# Patient Record
Sex: FEMALE | Race: OTHER | HISPANIC OR LATINO | Employment: OTHER | URBAN - METROPOLITAN AREA
[De-identification: names, ages, dates, MRNs, and addresses within clinical notes are randomized per-mention and may not be internally consistent; named-entity substitution may affect disease eponyms.]

---

## 2019-11-23 ENCOUNTER — APPOINTMENT (EMERGENCY)
Dept: RADIOLOGY | Facility: HOSPITAL | Age: 69
DRG: 963 | End: 2019-11-23
Payer: COMMERCIAL

## 2019-11-23 ENCOUNTER — HOSPITAL ENCOUNTER (INPATIENT)
Facility: HOSPITAL | Age: 69
LOS: 9 days | Discharge: HOME HEALTH | DRG: 963 | End: 2019-12-02
Attending: SURGERY | Admitting: SURGERY
Payer: COMMERCIAL

## 2019-11-23 ENCOUNTER — APPOINTMENT (INPATIENT)
Dept: RADIOLOGY | Facility: HOSPITAL | Age: 69
DRG: 963 | End: 2019-11-23
Payer: COMMERCIAL

## 2019-11-23 DIAGNOSIS — S43.015A ANTERIOR SHOULDER DISLOCATION, LEFT, INITIAL ENCOUNTER: Primary | ICD-10-CM

## 2019-11-23 DIAGNOSIS — S43.014A ANTERIOR SHOULDER DISLOCATION, RIGHT, INITIAL ENCOUNTER: ICD-10-CM

## 2019-11-23 DIAGNOSIS — S22.42XA MULTIPLE FRACTURES OF RIBS, LEFT SIDE, INITIAL ENCOUNTER FOR CLOSED FRACTURE: ICD-10-CM

## 2019-11-23 LAB
ABO GROUP BLD: NORMAL
APTT PPP: 22 SECONDS (ref 23–37)
BASE EXCESS BLDA CALC-SCNC: -1 MMOL/L (ref -2–3)
BASOPHILS # BLD AUTO: 0.05 THOUSANDS/ΜL (ref 0–0.1)
BASOPHILS NFR BLD AUTO: 1 % (ref 0–1)
BLD GP AB SCN SERPL QL: NEGATIVE
CA-I BLD-SCNC: 1.13 MMOL/L (ref 1.12–1.32)
EOSINOPHIL # BLD AUTO: 0.01 THOUSAND/ΜL (ref 0–0.61)
EOSINOPHIL NFR BLD AUTO: 0 % (ref 0–6)
ERYTHROCYTE [DISTWIDTH] IN BLOOD BY AUTOMATED COUNT: 14.4 % (ref 11.6–15.1)
GLUCOSE SERPL-MCNC: 130 MG/DL (ref 65–140)
HCO3 BLDA-SCNC: 24 MMOL/L (ref 24–30)
HCT VFR BLD AUTO: 39.4 % (ref 34.8–46.1)
HCT VFR BLD CALC: 38 % (ref 34.8–46.1)
HGB BLD-MCNC: 12.4 G/DL (ref 11.5–15.4)
HGB BLDA-MCNC: 12.9 G/DL (ref 11.5–15.4)
IMM GRANULOCYTES # BLD AUTO: 0.16 THOUSAND/UL (ref 0–0.2)
IMM GRANULOCYTES NFR BLD AUTO: 2 % (ref 0–2)
INR PPP: 0.98 (ref 0.84–1.19)
LYMPHOCYTES # BLD AUTO: 2.14 THOUSANDS/ΜL (ref 0.6–4.47)
LYMPHOCYTES NFR BLD AUTO: 22 % (ref 14–44)
MCH RBC QN AUTO: 29.5 PG (ref 26.8–34.3)
MCHC RBC AUTO-ENTMCNC: 31.5 G/DL (ref 31.4–37.4)
MCV RBC AUTO: 94 FL (ref 82–98)
MONOCYTES # BLD AUTO: 0.6 THOUSAND/ΜL (ref 0.17–1.22)
MONOCYTES NFR BLD AUTO: 6 % (ref 4–12)
NEUTROPHILS # BLD AUTO: 6.85 THOUSANDS/ΜL (ref 1.85–7.62)
NEUTS SEG NFR BLD AUTO: 69 % (ref 43–75)
NRBC BLD AUTO-RTO: 0 /100 WBCS
PCO2 BLD: 25 MMOL/L (ref 21–32)
PCO2 BLD: 40.7 MM HG (ref 42–50)
PH BLD: 7.38 [PH] (ref 7.3–7.4)
PLATELET # BLD AUTO: 265 THOUSANDS/UL (ref 149–390)
PMV BLD AUTO: 10.2 FL (ref 8.9–12.7)
PO2 BLD: 20 MM HG (ref 35–45)
POTASSIUM BLD-SCNC: 4 MMOL/L (ref 3.5–5.3)
PROTHROMBIN TIME: 12.6 SECONDS (ref 11.6–14.5)
RBC # BLD AUTO: 4.2 MILLION/UL (ref 3.81–5.12)
RH BLD: POSITIVE
SAO2 % BLD FROM PO2: 30 % (ref 60–85)
SODIUM BLD-SCNC: 142 MMOL/L (ref 136–145)
SPECIMEN EXPIRATION DATE: NORMAL
SPECIMEN SOURCE: ABNORMAL
WBC # BLD AUTO: 9.81 THOUSAND/UL (ref 4.31–10.16)

## 2019-11-23 PROCEDURE — NC001 PR NO CHARGE: Performed by: EMERGENCY MEDICINE

## 2019-11-23 PROCEDURE — 86901 BLOOD TYPING SEROLOGIC RH(D): CPT | Performed by: SURGERY

## 2019-11-23 PROCEDURE — 84132 ASSAY OF SERUM POTASSIUM: CPT

## 2019-11-23 PROCEDURE — 86900 BLOOD TYPING SEROLOGIC ABO: CPT | Performed by: SURGERY

## 2019-11-23 PROCEDURE — 85610 PROTHROMBIN TIME: CPT | Performed by: SURGERY

## 2019-11-23 PROCEDURE — 85025 COMPLETE CBC W/AUTO DIFF WBC: CPT | Performed by: SURGERY

## 2019-11-23 PROCEDURE — 99285 EMERGENCY DEPT VISIT HI MDM: CPT

## 2019-11-23 PROCEDURE — 73200 CT UPPER EXTREMITY W/O DYE: CPT

## 2019-11-23 PROCEDURE — 96375 TX/PRO/DX INJ NEW DRUG ADDON: CPT

## 2019-11-23 PROCEDURE — 73020 X-RAY EXAM OF SHOULDER: CPT

## 2019-11-23 PROCEDURE — 82330 ASSAY OF CALCIUM: CPT

## 2019-11-23 PROCEDURE — 36415 COLL VENOUS BLD VENIPUNCTURE: CPT | Performed by: STUDENT IN AN ORGANIZED HEALTH CARE EDUCATION/TRAINING PROGRAM

## 2019-11-23 PROCEDURE — 85730 THROMBOPLASTIN TIME PARTIAL: CPT | Performed by: SURGERY

## 2019-11-23 PROCEDURE — 72125 CT NECK SPINE W/O DYE: CPT

## 2019-11-23 PROCEDURE — 70450 CT HEAD/BRAIN W/O DYE: CPT

## 2019-11-23 PROCEDURE — 96374 THER/PROPH/DIAG INJ IV PUSH: CPT

## 2019-11-23 PROCEDURE — 96376 TX/PRO/DX INJ SAME DRUG ADON: CPT

## 2019-11-23 PROCEDURE — 84295 ASSAY OF SERUM SODIUM: CPT

## 2019-11-23 PROCEDURE — 99254 IP/OBS CNSLTJ NEW/EST MOD 60: CPT | Performed by: ORTHOPAEDIC SURGERY

## 2019-11-23 PROCEDURE — 82947 ASSAY GLUCOSE BLOOD QUANT: CPT

## 2019-11-23 PROCEDURE — 99222 1ST HOSP IP/OBS MODERATE 55: CPT | Performed by: SURGERY

## 2019-11-23 PROCEDURE — 94760 N-INVAS EAR/PLS OXIMETRY 1: CPT

## 2019-11-23 PROCEDURE — 74177 CT ABD & PELVIS W/CONTRAST: CPT

## 2019-11-23 PROCEDURE — 71260 CT THORAX DX C+: CPT

## 2019-11-23 PROCEDURE — 82803 BLOOD GASES ANY COMBINATION: CPT

## 2019-11-23 PROCEDURE — 85014 HEMATOCRIT: CPT

## 2019-11-23 PROCEDURE — NC001 PR NO CHARGE: Performed by: SURGERY

## 2019-11-23 PROCEDURE — 86850 RBC ANTIBODY SCREEN: CPT | Performed by: SURGERY

## 2019-11-23 RX ORDER — CHLORHEXIDINE GLUCONATE 0.12 MG/ML
15 RINSE ORAL EVERY 12 HOURS SCHEDULED
Status: DISCONTINUED | OUTPATIENT
Start: 2019-11-23 | End: 2019-11-25

## 2019-11-23 RX ORDER — GABAPENTIN 300 MG/1
300 CAPSULE ORAL
Status: DISCONTINUED | OUTPATIENT
Start: 2019-11-23 | End: 2019-12-02 | Stop reason: HOSPADM

## 2019-11-23 RX ORDER — HYDROMORPHONE HCL/PF 1 MG/ML
0.5 SYRINGE (ML) INJECTION
Status: DISCONTINUED | OUTPATIENT
Start: 2019-11-23 | End: 2019-11-23

## 2019-11-23 RX ORDER — FENTANYL CITRATE 50 UG/ML
INJECTION, SOLUTION INTRAMUSCULAR; INTRAVENOUS CODE/TRAUMA/SEDATION MEDICATION
Status: COMPLETED | OUTPATIENT
Start: 2019-11-23 | End: 2019-11-23

## 2019-11-23 RX ORDER — METHOCARBAMOL 500 MG/1
500 TABLET, FILM COATED ORAL EVERY 6 HOURS SCHEDULED
Status: DISCONTINUED | OUTPATIENT
Start: 2019-11-23 | End: 2019-12-01

## 2019-11-23 RX ORDER — LIDOCAINE 50 MG/G
1 PATCH TOPICAL DAILY
Status: DISCONTINUED | OUTPATIENT
Start: 2019-11-23 | End: 2019-12-02 | Stop reason: HOSPADM

## 2019-11-23 RX ORDER — LIDOCAINE HYDROCHLORIDE 10 MG/ML
20 INJECTION, SOLUTION EPIDURAL; INFILTRATION; INTRACAUDAL; PERINEURAL ONCE
Status: COMPLETED | OUTPATIENT
Start: 2019-11-23 | End: 2019-11-23

## 2019-11-23 RX ORDER — OXYCODONE HYDROCHLORIDE 10 MG/1
10 TABLET ORAL EVERY 4 HOURS PRN
Status: DISCONTINUED | OUTPATIENT
Start: 2019-11-23 | End: 2019-11-25

## 2019-11-23 RX ORDER — ETOMIDATE 2 MG/ML
10 INJECTION INTRAVENOUS ONCE
Status: COMPLETED | OUTPATIENT
Start: 2019-11-23 | End: 2019-11-23

## 2019-11-23 RX ORDER — OXYCODONE HYDROCHLORIDE 5 MG/1
5 TABLET ORAL EVERY 4 HOURS PRN
Status: DISCONTINUED | OUTPATIENT
Start: 2019-11-23 | End: 2019-11-25

## 2019-11-23 RX ORDER — ACETAMINOPHEN 325 MG/1
975 TABLET ORAL EVERY 8 HOURS SCHEDULED
Status: DISCONTINUED | OUTPATIENT
Start: 2019-11-23 | End: 2019-12-02 | Stop reason: HOSPADM

## 2019-11-23 RX ORDER — HYDROMORPHONE HCL/PF 1 MG/ML
1 SYRINGE (ML) INJECTION
Status: DISCONTINUED | OUTPATIENT
Start: 2019-11-23 | End: 2019-11-25

## 2019-11-23 RX ADMIN — HYDROMORPHONE HYDROCHLORIDE 0.5 MG: 1 INJECTION, SOLUTION INTRAMUSCULAR; INTRAVENOUS; SUBCUTANEOUS at 18:06

## 2019-11-23 RX ADMIN — METHOCARBAMOL TABLETS 500 MG: 500 TABLET, COATED ORAL at 20:12

## 2019-11-23 RX ADMIN — FENTANYL CITRATE 50 MCG: 50 INJECTION, SOLUTION INTRAMUSCULAR; INTRAVENOUS at 17:01

## 2019-11-23 RX ADMIN — ETOMIDATE 10 MG: 2 INJECTION, SOLUTION INTRAVENOUS at 18:25

## 2019-11-23 RX ADMIN — HYDROMORPHONE HYDROCHLORIDE 1 MG: 1 INJECTION, SOLUTION INTRAMUSCULAR; INTRAVENOUS; SUBCUTANEOUS at 19:55

## 2019-11-23 RX ADMIN — ACETAMINOPHEN 975 MG: 325 TABLET ORAL at 21:51

## 2019-11-23 RX ADMIN — GABAPENTIN 300 MG: 300 CAPSULE ORAL at 21:51

## 2019-11-23 RX ADMIN — FENTANYL CITRATE 50 MCG: 50 INJECTION, SOLUTION INTRAMUSCULAR; INTRAVENOUS at 16:30

## 2019-11-23 RX ADMIN — OXYCODONE HYDROCHLORIDE 5 MG: 5 TABLET ORAL at 21:26

## 2019-11-23 RX ADMIN — OXYCODONE HYDROCHLORIDE 5 MG: 5 TABLET ORAL at 20:45

## 2019-11-23 RX ADMIN — LIDOCAINE 1 PATCH: 50 PATCH CUTANEOUS at 20:12

## 2019-11-23 RX ADMIN — LIDOCAINE HYDROCHLORIDE 20 ML: 10 INJECTION, SOLUTION EPIDURAL; INFILTRATION; INTRACAUDAL; PERINEURAL at 17:17

## 2019-11-23 RX ADMIN — IOHEXOL 100 ML: 350 INJECTION, SOLUTION INTRAVENOUS at 16:45

## 2019-11-23 RX ADMIN — CHLORHEXIDINE GLUCONATE 0.12% ORAL RINSE 15 ML: 1.2 LIQUID ORAL at 21:50

## 2019-11-23 NOTE — H&P
H&P Exam - [address] [facility] 81 y o  female MRN: [id number]  Unit/Bed#: ICU 14 Encounter: [id number]    Assessment/Plan   Trauma Alert: Level B  Model of Arrival: Ambulance  Trauma Team: Attending Dr TREVIZO [facility]  Consultants: None    Trauma Active Problems:   Multiple left-sided rib fractures  Anterior dislocation of Right shoulder    Trauma Plan:   Admit to trauma, ICU  Rib fx protocol  Ortho consult    Chief Complaint: Right shoulder and left flank pain    History of Present Illness   HPI:  Michi Danielson is a 81 y o  female who presents as a level B trauma status post fall down approximately 8 steps  Patient presented with pain complaints to right shoulder and left flank  Patient also endorsed hitting head, + LOC  Patient denies any history of blood thinners  Imaging revealed anterior dislocation of the right shoulder and multiple left-sided rib fractures  Review of Systems  12-point, complete review of systems was reviewed and negative except as stated above  Historical Information   History reviewed  No pertinent past medical history  History reviewed  No pertinent surgical history  Social History   Social History     Substance and Sexual Activity   Alcohol Use Not Currently     Social History     Substance and Sexual Activity   Drug Use Not Currently     Social History     Tobacco Use   Smoking Status Never Smoker       There is no immunization history on file for this patient    Last Tetanus: Today  Family History: Non-contributory    Meds/Allergies   current meds:   Current Facility-Administered Medications   Medication Dose Route Frequency    acetaminophen (TYLENOL) tablet 975 mg  975 mg Oral Q8H [address]    chlorhexidine (PERIDEX) 0 12 % oral rinse 15 mL  15 mL Swish & Spit Q12H [address]    gabapentin (NEURONTIN) capsule 300 mg  300 mg Oral HS    HYDROmorphone (DILAUDID) injection 1 mg  1 mg Intravenous Q3H PRN    lidocaine (LIDODERM) 5 % patch 1 patch  1 patch Topical Daily    methocarbamol (ROBAXIN) tablet 500 mg  500 mg Oral Q6H [facility]    oxyCODONE (ROXICODONE) immediate release tablet 10 mg  10 mg Oral Q4H PRN    oxyCODONE (ROXICODONE) IR tablet 5 mg  5 mg Oral Q4H PRN    and PTA meds:   None       No Known Allergies      PHYSICAL EXAM  Objective   Vitals:   First set: Temperature: 97 5 °F (36 4 °C) (11/23/19 1630)  Pulse: 79 (11/23/19 1630)  Respirations: (!) 24 (11/23/19 1630)  Blood Pressure: 122/67 (11/23/19 1630)    Primary Survey:   (A) Airway:  Intact  (B) Breathing:  CTAB  (C) Circulation: Pulses:   normal  (D) Disabliity:  GCS Total:  15  (E) Expose:  Completed    Secondary Survey: (Click on Physical Exam tab above)  Physical Exam   Constitutional: She is oriented to person, place, and time  She appears well-developed and well-nourished  No distress  HENT:   Head: Normocephalic  Right Ear: External ear normal    Left Ear: External ear normal    Nose: Nose normal    Mouth/Throat: Oropharynx is clear and moist    Hematoma with laceration over left posterior scalp   Eyes: Pupils are equal, round, and reactive to light  Conjunctivae and EOM are normal  No scleral icterus  Neck: Normal range of motion  Neck supple  No JVD present  No tracheal deviation present  C-collar in place   Cardiovascular: Normal rate, regular rhythm and intact distal pulses  Pulmonary/Chest: Effort normal  No respiratory distress  She exhibits no tenderness  Abdominal: Soft  She exhibits no distension  There is tenderness  There is no rebound and no guarding  Left flank tenderness   Genitourinary: Vagina normal    Musculoskeletal: Normal range of motion  She exhibits tenderness  She exhibits no edema  Right shoulder tenderness   Neurological: She is alert and oriented to person, place, and time  No cranial nerve deficit or sensory deficit  Skin: Skin is warm and dry  She is not diaphoretic  Psychiatric: She has a normal mood and affect   Her behavior is normal  Judgment and thought content normal  Nursing note and vitals reviewed  Invasive Devices     Peripheral Intravenous Line            Peripheral IV 11/23/19 Left Antecubital less than 1 day    Peripheral IV 11/23/19 Left Wrist less than 1 day                Lab Results:   BMP/CMP:   Lab Results   Component Value Date    CO2 25 11/23/2019    GLUCOSE 130 11/23/2019   , CBC:   Lab Results   Component Value Date    WBC 9 81 11/23/2019    HGB 12 4 11/23/2019    HCT 39 4 11/23/2019    MCV 94 11/23/2019     11/23/2019    MCH 29 5 11/23/2019    MCHC 31 5 11/23/2019    RDW 14 4 11/23/2019    MPV 10 2 11/23/2019    NRBC 0 11/23/2019   , Coagulation:   Lab Results   Component Value Date    INR 0 98 11/23/2019   , Lactate: No results found for: LACTATE, Amylase: No results found for: AMYLASE, Lipase: No results found for: LIPASE, AST: No results found for: AST, ALT: No results found for: ALT, Urinalysis: No results found for: COLORU, CLARITYU, SPECGRAV, PHUR, LEUKOCYTESUR, NITRITE, PROTEINUA, GLUCOSEU, KETONESU, BILIRUBINUR, BLOODU, CK: No results found for: CKTOTAL, Troponin: No results found for: TROPONINI, EtOH: No results found for: ETOH, UDS: No components found for: RAPIDDRUGSCREEN, Pregnancy: No results found for: PREGTESTUR, ABG: No results found for: PHART, OCX0SCW, PO2ART, XNJ0NKH, L0JVCZHE, BEART, SOURCE and ISTAT: No components found for: VBG  Imaging/EKG Studies:   Procedure: Ct Head Without Contrast  Result Date: 11/23/2019  Impression: Large posterior scalp hematoma and laceration without subjacent skull fracture, intracranial hemorrhage, or other acute intracranial findings  Procedure: Ct Spine Cervical Without Contrast  Result Date: 11/23/2019  Impression: No cervical spine fracture or traumatic malalignment  Procedure: Ct Shoulder Right Wo Contrast  Result Date: 11/23/2019  Impression: 1  Interval reduction of anterior dislocation with normal alignment  2   Hill-Sachs and osseous Bankart fractures again seen    Bankart fragment is displaced inferomedially  Procedure: Ct Chest Abdomen Pelvis W Contrast  Result Date: 11/23/2019  Impression: 1  Multiple left-sided rib fractures, as above  Flail segment at the lateral and posterior 8th and 9th ribs  Shaft width displacement of the posterior left 10th rib with minimal subpleural hematoma  No pneumothorax, hemothorax, pulmonary contusion, or visceral injuries in the left upper quadrant of the abdomen  2   Anterior subcoracoid humeral dislocation with associated Hill-Sachs and osseous Bankart fractures  Procedure: Xr Trauma Multiple  Result Date: 11/23/2019  Impression: 1  Known left-sided rib fractures are not well demonstrated radiographically  Mild atelectasis the left lower lobe from splinting  No contusion, pneumothorax, or hemothorax  2   Right glenohumeral dislocation  3   Normal pelvis        Code Status: Level 1 - Full Code  Advance Directive and Living Will:      Power of :    POLST:

## 2019-11-23 NOTE — PROGRESS NOTES
Cervical Collar Clearance: The patient had a CT scan of the cervical spine demonstrating no acute injury  On exam, the patient had no midline point tenderness or paresthesias/numbness/weakness in the extremities  The patient had full range of motion (was then able to flex, extend, and rotate head laterally) without pain  There were no distracting injuries and the patient was not intoxicated  The patient's cervical spine was cleared radiologically and clinically  Cervical collar removed at this time       Loree Mcgill MD  11/23/2019 6:52 PM

## 2019-11-23 NOTE — ED PROVIDER NOTES
Emergency Department Airway Evaluation and Management Form    History  Obtained from: EMS  Patient has no known allergies  Chief Complaint   Patient presents with    Fall     see trauma flow sheet     HPI  [place] down 8 concrete stairs  Negative LOC  C/o head, shoulder, abdomen pain  History reviewed  No pertinent past medical history  History reviewed  No pertinent surgical history  History reviewed  No pertinent family history  Social History     Tobacco Use    Smoking status: Never Smoker   Substance Use Topics    Alcohol use: Not Currently    Drug use: Not Currently     I have reviewed and agree with the history as documented  Review of Systems    Physical Exam  /73 (BP Location: Left arm)   Pulse 77   Temp 97 5 °F (36 4 °C) (Oral)   Resp 16   Wt 80 8 kg (178 lb 2 1 oz)   SpO2 95%     Physical Exam  Airway intact  Trachea midline  Breath sounds bilaterally  Chest nontender  Pulses intact  Vitals reviewed  GCS 15  Moves all 4 extremities  ED Medications  Medications   HYDROmorphone (DILAUDID) injection 1 mg (has no administration in time range)   fentanyl citrate (PF) 100 MCG/2ML (50 mcg Intravenous Given 11/23/19 1630)   iohexol (OMNIPAQUE) 350 MG/ML injection (MULTI-DOSE) 100 mL (100 mL Intravenous Given 11/23/19 1645)   lidocaine (PF) (XYLOCAINE-MPF) 1 % injection 20 mL (20 mL Infiltration Given 11/23/19 1717)   fentanyl citrate (PF) 100 MCG/2ML (50 mcg Intravenous Given 11/23/19 1701)   etomidate (AMIDATE) 2 mg/mL injection 10 mg (10 mg Intravenous Given 11/23/19 1825)       Intubation  Procedures    Notes  No acute airway issues      Final Diagnosis  Final diagnoses:   Anterior shoulder dislocation, left, initial encounter   Anterior shoulder dislocation, right, initial encounter       ED Provider  Electronically Signed by     Reinaldo Todd MD  11/23/19 4296

## 2019-11-23 NOTE — CONSULTS
[address] [facility] 81 y o  female MRN: [id number]  Unit/Bed#: ED 29 Encounter: [id number]     Physician Requesting Consult: No att  providers found  Consults     Reason for Consultation / Chief Complaint: s/p fall down 8 concrete stairs     History of Present Illness:  Bethanie Rios is a 81 y o  female w/ no reported PMH/PSH, who presents as a Level B Trauma, s/p mechanical fall down 8 concrete steps, striking her head and L side  Per pt and her family, she tripped and fell down the flight of steps  Upon arrival to [facility], pt had GCS of 15, A&O x4  CT Chest revealed multiple L-sided rib fx (lateral and posterior 8th and 9th ribs, shaft width displacement of posterior L 10th rib), with minimal subpleural hematoma, as well as an anterior R subcoracoid humeral dislocation with associated Hill-Sachs and osseous Bankart fractures  CTH showed large posterior scalp hematoma and laceration without any adjacent skull fx or hemorrhage  CT C-spine negative  Currently, pt c/o 10/10 pain in her R shoulder and L chest  Orthopedic Surgery at bedside in ED for R shoulder reduction  History obtained from chart review and the patient  Past Medical History:  History reviewed  No pertinent past medical history  Past Surgical History:  History reviewed  No pertinent surgical history  Past Family History:  History reviewed  No pertinent family history       Social History:  Social History     Tobacco Use   Smoking Status Never Smoker     Social History     Substance and Sexual Activity   Alcohol Use Not Currently     Social History     Substance and Sexual Activity   Drug Use Not Currently     Marital Status: /Civil Union    Home Medications:   Prior to Admission medications    Not on File        Inpatient Medications:  Scheduled Meds:  Current Facility-Administered Medications:  HYDROmorphone 0 5 mg Intravenous Q3H PRN Demi Collins MD     Continuous Infusions:   PRN Meds:    HYDROmorphone 0 5 mg Q3H PRN        Allergies:  No Known Allergies     ROS:   Review of Systems   Constitutional: Negative for chills, diaphoresis, fatigue and fever  HENT: Negative for congestion, postnasal drip, rhinorrhea, sinus pressure, sinus pain, sore throat and trouble swallowing  Eyes: Negative for visual disturbance  Respiratory: Negative for cough, chest tightness, shortness of breath, wheezing and stridor  Cardiovascular: Positive for chest pain  Negative for palpitations and leg swelling  +L-sided chest pain   Gastrointestinal: Negative for abdominal distention, abdominal pain, constipation, diarrhea, nausea and vomiting  Endocrine: Negative for polyphagia  Genitourinary: Negative for flank pain and urgency  Musculoskeletal: Negative for arthralgias, back pain, gait problem, joint swelling, myalgias, neck pain and neck stiffness  + R shoulder pain   Skin: Negative for color change, pallor, rash and wound  Allergic/Immunologic: Negative for environmental allergies, food allergies and immunocompromised state  Neurological: Negative for dizziness, facial asymmetry, light-headedness and headaches  Psychiatric/Behavioral: Negative for agitation, behavioral problems and confusion  Vitals:  Vitals:    19 1635 19 1645 19 1700 19 1715   BP: (!) 176/91 (!) 189/85 (!) 181/80 (!) 175/72   Pulse: 74 77 82 76   Resp: 18 18 18 20   Temp:       TempSrc:       SpO2: 97% 98% 97% 96%   Weight:         Temperature:   Temp (24hrs), Av 5 °F (36 4 °C), Min:97 5 °F (36 4 °C), Max:97 5 °F (36 4 °C)    Current Temperature: 97 5 °F (36 4 °C)     Weights:   IBW: -92 5 kg  There is no height or weight on file to calculate BMI       Hemodynamic Monitoring:  N/A     Non-Invasive/Invasive Ventilation Settings:  Respiratory    Lab Data (Last 4 hours)    None         O2/Vent Data (Last 4 hours)    None              No results found for: PHART, AIA1LWS, PO2ART, FHE2KHW, X6ZYTQMZ, BEART, SOURCE  SpO2: SpO2: 96 %     Physical Exam:  Physical Exam   Constitutional: She is oriented to person, place, and time  She appears well-developed and well-nourished  No distress  HENT:   Head: Normocephalic and atraumatic  Eyes: EOM are normal    Neck: Normal range of motion  Neck supple  Cardiovascular: Normal rate and regular rhythm  Pulmonary/Chest: Effort normal  She exhibits tenderness    + L chest tenderness upon palpation  (-) crepitus   Abdominal: Soft  She exhibits no distension  There is no tenderness  Musculoskeletal:   + R shoulder tenderness   Neurological: She is alert and oriented to person, place, and time  Skin: Skin is warm  She is not diaphoretic  Psychiatric: She has a normal mood and affect  Her behavior is normal    Vitals reviewed  Labs:  Results from last 7 days   Lab Units 11/23/19  1641 11/23/19  1632   WBC Thousand/uL 9 81  --    HEMOGLOBIN g/dL 12 4  --    I STAT HEMOGLOBIN g/dl  --  12 9   HEMATOCRIT % 39 4  --    HEMATOCRIT, ISTAT %  --  38   PLATELETS Thousands/uL 265  --    NEUTROS PCT % 69  --    MONOS PCT % 6  --     Results from last 7 days   Lab Units 11/23/19  1632   CO2, I-STAT mmol/L 25   GLUCOSE, ISTAT mg/dl 130              Results from last 7 days   Lab Units 11/23/19  1641   INR  0 98   PTT seconds 22*         No results found for: TROPONINI     Imaging:     CT head without contrast   Final Result by Alma Delia Wang MD (11/23 1718)      Large posterior scalp hematoma and laceration without subjacent skull fracture, intracranial hemorrhage, or other acute intracranial findings  I personally discussed this study with Matilde FREEMAN on 11/23/2019 at 5:09 PM                   Workstation performed: [id number]         CT spine cervical without contrast   Final Result by Alma Delia Wang MD (11/23 1718)      No cervical spine fracture or traumatic malalignment        I personally discussed this study with Andrei Keen CLAUDE PETIT-ME on 11/23/2019 at 5:09 PM           Workstation performed: [id number]         CT chest abdomen pelvis w contrast   Final Result by Alyse Freeman MD (11/23 1881)      1  Multiple left-sided rib fractures, as above  Flail segment at the lateral and posterior 8th and 9th ribs  Shaft width displacement of the posterior left 10th rib with minimal subpleural hematoma  No pneumothorax, hemothorax, pulmonary contusion,    or visceral injuries in the left upper quadrant of the abdomen  2   Anterior subcoracoid humeral dislocation with associated Hill-Sachs and osseous Bankart fractures  I personally discussed this study with Brianna FREEMAN on 11/23/2019 at 5:09 PM                Workstation performed: [id number]         XR trauma multiple   Final Result by Alyse Freeman MD (11/23 4266)      1  Known left-sided rib fractures are not well demonstrated radiographically  Mild atelectasis the left lower lobe from splinting  No contusion, pneumothorax, or hemothorax  2   Right glenohumeral dislocation  3   Normal pelvis        I personally discussed this study with Brianna FREEMAN on 11/23/2019 at 5:09 PM       Workstation performed: [id number]         XR chest 1 view    (Results Pending)   XR pelvis ap only 1 or 2 vw    (Results Pending)   XR shoulder 2+ vw right    (Results Pending)       Micro:  No results found for: MARIELENA RobertsonLTANTONIA    Assessment: 82 y/o F s/p fall down 8 concrete stairs,with R anterior shoulder dislocation with Hill-Sachs and Bankart fx, multiple L-sided rib  fx        Plan:                  Neuro:       --CTH showing large posterior scalp hematoma and laceration without any adjacent skull fx or hemorrhage      --CT C-spine negative      --GCS 15, A&O x4      --Stable, continue to monitor       --C-spine cleared at bedside                 CV:      --Hemodynamically stable      --Maintain MAP >65                 Lung: --Maintain SpO2>92%     --IS, pulmonary toilet     --AM CXR                 GI:      --No acute issues                 FEN:      --Fluids: n/a     --Replete electrolytes PRN     --Nutrition: Regular diet                 :      --Strict I/Os                 ID:      --No active issues                 Heme:       --Trend H/H      --Transfuse PRN for Hgb <7                 Endo:       --No active issues                 Msk/Skin:       --R anterior shoulder dislocation-Orthopedics consulted, reduction to be performed at bedside      --Sling to be applied to RUE      --Evidence of R humerus fx, Ortho will order CT R shoulder      --Multiple L-sided rib fx---Rib fx protocol, APS c/s      --Routine skin care      --PT/OT                 Disposition: Critical Care     VTE Pharmacologic Prophylaxis: Sequential compression device (Venodyne)  and Heparin  VTE Mechanical Prophylaxis: sequential compression device     Invasive lines and devices: Invasive Devices     Peripheral Intravenous Line            Peripheral IV 11/23/19 Left Antecubital less than 1 day    Peripheral IV 11/23/19 Left Wrist less than 1 day                 Code Status: No Order  POA:    POLST:       Given critical illness, patient length of stay will require greater than two midnights  Counseling / Coordination of Care  Total Critical Care time spent 30 minutes excluding procedures, teaching and family updates  Portions of the record may have been created with voice recognition software  Occasional wrong word or "sound a like" substitutions may have occurred due to the inherent limitations of voice recognition software  Read the chart carefully and recognize, using context, where substitutions have occurred          Aisha Hendrix MD

## 2019-11-23 NOTE — CONSULTS
[address] 81 y o  female MRN: [id number]  Unit/Bed#: Cat Scan      Chief Complaint:   right shoulder pain    HPI:  81 y o  right hand dominant female complaining of right shoulder pain  She fell down approximately 8 stair earlier today and was found to have an anterior shoulder dislocation on the right as well as multiple left sided rib fractures  Pain to the shoulder is global, worse with attempted motion or palpation and improves somewhat with rest  Patient denies any numbness or tingling in the extremity  She denies any further orthopaedic complaints  Review Of Systems:   · Skin: Normal  · Neuro: See HPI  · Musculoskeletal: See HPI  · 14 point review of systems negative except as stated above     Past Medical History:   History reviewed  No pertinent past medical history  Past Surgical History:   History reviewed  No pertinent surgical history  Family History:  Family history reviewed and non-contributory  History reviewed  No pertinent family history      Social History:  Social History     Socioeconomic History    Marital status: /Civil Union     Spouse name: None    Number of children: None    Years of education: None    Highest education level: None   Occupational History    None   Social Needs    Financial resource strain: None    Food insecurity:     Worry: None     Inability: None    Transportation needs:     Medical: None     Non-medical: None   Tobacco Use    Smoking status: Never Smoker   Substance and Sexual Activity    Alcohol use: Not Currently    Drug use: Not Currently    Sexual activity: None   Lifestyle    Physical activity:     Days per week: None     Minutes per session: None    Stress: None   Relationships    Social connections:     Talks on phone: None     Gets together: None     Attends Buddhism service: None     Active member of club or organization: None     Attends meetings of clubs or organizations: None     Relationship status: None  Intimate partner violence:     Fear of current or ex partner: None     Emotionally abused: None     Physically abused: None     Forced sexual activity: None   Other Topics Concern    None   Social History Narrative    None       Allergies:   No Known Allergies        Labs:  0   Lab Value Date/Time    HCT 39 4 11/23/2019 1641    HCT 38 11/23/2019 1632    HGB 12 4 11/23/2019 1641    HGB 12 9 11/23/2019 1632    INR 0 98 11/23/2019 1641    WBC 9 81 11/23/2019 1641       Meds:    Current Facility-Administered Medications:     acetaminophen (TYLENOL) tablet 975 mg, 975 mg, Oral, Q8H [address], Niya Rodriguez MD    chlorhexidine (PERIDEX) 0 12 % oral rinse 15 mL, 15 mL, Swish & [place], Q12H [address]Niya MD    gabapentin (NEURONTIN) capsule 300 mg, 300 mg, Oral, HS, Niya Rodriguez MD    HYDROmorphone (DILAUDID) injection 1 mg, 1 mg, Intravenous, Q3H PRN, Niya Rodriguez MD    lidocaine (LIDODERM) 5 % patch 1 patch, 1 patch, Topical, Daily, Niya Rodriguez MD    methocarbamol (ROBAXIN) tablet 500 mg, 500 mg, Oral, Q6H [address]Ni[address] MD    oxyCODONE (ROXICODONE) immediate release tablet 10 mg, 10 mg, Oral, Q4H PRN, Niya Rodriguez MD    oxyCODONE (ROXICODONE) IR tablet 5 mg, 5 mg, Oral, Q4H PRN, Niya Rodriguez MD  No current outpatient medications on file  Blood Culture:   No results found for: BLOODCX    Wound Culture:   No results found for: WOUNDCULT    Ins and Outs:  I/O last 24 hours: In: 5 [I V :5]  Out: -           Physical Exam:   /73 (BP Location: Left arm)   Pulse 78   Temp 97 5 °F (36 4 °C) (Oral)   Resp 16   Wt 80 8 kg (178 lb 2 1 oz)   SpO2 95%   Gen: Alert and oriented to person, place, time  HEENT: EOMI, eyes clear, moist mucus membranes, hearing intact  Respiratory: Bilateral chest rise   No audible wheezing found  Cardiovascular: Regular Rate and intact distal pulses  Abdomen: soft nontender/nondistended  Musculoskeletal: right upper extremity  · Skin pink, dry, and intact  · Painful range of motion  · Active and passive ROM limited by pain  · Mechanical block to internal rotation appreciated  · Sensation intact to axillary, musculocutaneous, radial, ulna, median nerves  Sensation intact over the deltoid  · Motor intact to musculocutaneous, radial, ulnar, and median nerves  Intact deltoid twitch  · Palpable radial pulse    Radiology:   I personally reviewed the films  X-rays of right shoulder shows anterior dislocation of the glenohumeral joint    _*_*_*_*_*_*_*_*_*_*_*_*_*_*_*_*_*_*_*_*_*_*_*_*_*_*_*_*_*_*_*_*_*_*_*_*_*_*_*_*_*    Procedure- [address]th[address]ome[address] 81 y o  female MRN: [id number]  Unit/Bed#: Cat Scan    Procedure note: Shoulder reduction    After informed consent was obtained and a time out was performed, pt underwent conscious sedation performed by the emergency department  Pts shoulder was reduced using a gentle traction/countertraction maneuver  Post reduction there were no blocks to motion or instability of the glenohumeral joint  Pt was placed in a sling  Post reduction x rays are ordered and will be reviewed  Pt tolerated the procedure well and was neurovascularly intact pre and post procedure        _*_*_*_*_*_*_*_*_*_*_*_*_*_*_*_*_*_*_*_*_*_*_*_*_*_*_*_*_*_*_*_*_*_*_*_*_*_*_*_*_*    Assessment:  81 y o  female with right shoulder dislocation s/p closed reduction  Will obtain dedicated shoulder CT to further evaluate for fractures of the glenoid    Anticipate no surgical intervention during this hospitalization    Plan:   · NWB right upper extremity in sling  · ICE and Analgesics for pain  · Will follow up dedicated shoulder CT  · Ortho will follow         Ricco Malik MD

## 2019-11-24 ENCOUNTER — APPOINTMENT (INPATIENT)
Dept: RADIOLOGY | Facility: HOSPITAL | Age: 69
DRG: 963 | End: 2019-11-24
Payer: COMMERCIAL

## 2019-11-24 LAB
ANION GAP SERPL CALCULATED.3IONS-SCNC: 7 MMOL/L (ref 4–13)
BASOPHILS # BLD AUTO: 0.04 THOUSANDS/ΜL (ref 0–0.1)
BASOPHILS NFR BLD AUTO: 0 % (ref 0–1)
BUN SERPL-MCNC: 23 MG/DL (ref 5–25)
CA-I BLD-SCNC: 1.11 MMOL/L (ref 1.12–1.32)
CALCIUM SERPL-MCNC: 8.3 MG/DL (ref 8.3–10.1)
CHLORIDE SERPL-SCNC: 110 MMOL/L (ref 100–108)
CO2 SERPL-SCNC: 24 MMOL/L (ref 21–32)
CREAT SERPL-MCNC: 0.98 MG/DL (ref 0.6–1.3)
EOSINOPHIL # BLD AUTO: 0.09 THOUSAND/ΜL (ref 0–0.61)
EOSINOPHIL NFR BLD AUTO: 1 % (ref 0–6)
ERYTHROCYTE [DISTWIDTH] IN BLOOD BY AUTOMATED COUNT: 14.7 % (ref 11.6–15.1)
GFR SERPL CREATININE-BSD FRML MDRD: 60 ML/MIN/1.73SQ M
GLUCOSE SERPL-MCNC: 129 MG/DL (ref 65–140)
HCT VFR BLD AUTO: 37.2 % (ref 34.8–46.1)
HGB BLD-MCNC: 11.5 G/DL (ref 11.5–15.4)
IMM GRANULOCYTES # BLD AUTO: 0.09 THOUSAND/UL (ref 0–0.2)
IMM GRANULOCYTES NFR BLD AUTO: 1 % (ref 0–2)
LYMPHOCYTES # BLD AUTO: 1.09 THOUSANDS/ΜL (ref 0.6–4.47)
LYMPHOCYTES NFR BLD AUTO: 10 % (ref 14–44)
MAGNESIUM SERPL-MCNC: 2.1 MG/DL (ref 1.6–2.6)
MCH RBC QN AUTO: 29.3 PG (ref 26.8–34.3)
MCHC RBC AUTO-ENTMCNC: 30.9 G/DL (ref 31.4–37.4)
MCV RBC AUTO: 95 FL (ref 82–98)
MONOCYTES # BLD AUTO: 1.22 THOUSAND/ΜL (ref 0.17–1.22)
MONOCYTES NFR BLD AUTO: 11 % (ref 4–12)
NEUTROPHILS # BLD AUTO: 8.51 THOUSANDS/ΜL (ref 1.85–7.62)
NEUTS SEG NFR BLD AUTO: 77 % (ref 43–75)
NRBC BLD AUTO-RTO: 0 /100 WBCS
PHOSPHATE SERPL-MCNC: 4.3 MG/DL (ref 2.3–4.1)
PLATELET # BLD AUTO: 172 THOUSANDS/UL (ref 149–390)
PMV BLD AUTO: 10 FL (ref 8.9–12.7)
POTASSIUM SERPL-SCNC: 4.3 MMOL/L (ref 3.5–5.3)
RBC # BLD AUTO: 3.92 MILLION/UL (ref 3.81–5.12)
SODIUM SERPL-SCNC: 141 MMOL/L (ref 136–145)
WBC # BLD AUTO: 11.04 THOUSAND/UL (ref 4.31–10.16)

## 2019-11-24 PROCEDURE — 82330 ASSAY OF CALCIUM: CPT | Performed by: SURGERY

## 2019-11-24 PROCEDURE — 94668 MNPJ CHEST WALL SBSQ: CPT

## 2019-11-24 PROCEDURE — 94760 N-INVAS EAR/PLS OXIMETRY 1: CPT

## 2019-11-24 PROCEDURE — 90662 IIV NO PRSV INCREASED AG IM: CPT | Performed by: SURGERY

## 2019-11-24 PROCEDURE — 94664 DEMO&/EVAL PT USE INHALER: CPT

## 2019-11-24 PROCEDURE — 80048 BASIC METABOLIC PNL TOTAL CA: CPT | Performed by: SURGERY

## 2019-11-24 PROCEDURE — 83735 ASSAY OF MAGNESIUM: CPT | Performed by: SURGERY

## 2019-11-24 PROCEDURE — 90471 IMMUNIZATION ADMIN: CPT | Performed by: SURGERY

## 2019-11-24 PROCEDURE — 71045 X-RAY EXAM CHEST 1 VIEW: CPT

## 2019-11-24 PROCEDURE — 84100 ASSAY OF PHOSPHORUS: CPT | Performed by: SURGERY

## 2019-11-24 PROCEDURE — 85025 COMPLETE CBC W/AUTO DIFF WBC: CPT | Performed by: SURGERY

## 2019-11-24 PROCEDURE — 99232 SBSQ HOSP IP/OBS MODERATE 35: CPT | Performed by: SURGERY

## 2019-11-24 PROCEDURE — 94660 CPAP INITIATION&MGMT: CPT

## 2019-11-24 RX ORDER — DEXTROSE MONOHYDRATE 25 G/50ML
INJECTION, SOLUTION INTRAVENOUS
Status: DISPENSED
Start: 2019-11-24 | End: 2019-11-24

## 2019-11-24 RX ORDER — ONDANSETRON 2 MG/ML
INJECTION INTRAMUSCULAR; INTRAVENOUS
Status: COMPLETED
Start: 2019-11-24 | End: 2019-11-24

## 2019-11-24 RX ORDER — SENNOSIDES 8.6 MG
1 TABLET ORAL
Status: DISCONTINUED | OUTPATIENT
Start: 2019-11-24 | End: 2019-12-02 | Stop reason: HOSPADM

## 2019-11-24 RX ORDER — ONDANSETRON 2 MG/ML
4 INJECTION INTRAMUSCULAR; INTRAVENOUS EVERY 6 HOURS PRN
Status: DISCONTINUED | OUTPATIENT
Start: 2019-11-24 | End: 2019-12-02 | Stop reason: HOSPADM

## 2019-11-24 RX ADMIN — SENNOSIDES 8.6 MG: 8.6 TABLET, FILM COATED ORAL at 23:59

## 2019-11-24 RX ADMIN — CHLORHEXIDINE GLUCONATE 0.12% ORAL RINSE 15 ML: 1.2 LIQUID ORAL at 20:27

## 2019-11-24 RX ADMIN — METHOCARBAMOL TABLETS 500 MG: 500 TABLET, COATED ORAL at 00:08

## 2019-11-24 RX ADMIN — ENOXAPARIN SODIUM 30 MG: 30 INJECTION SUBCUTANEOUS at 20:27

## 2019-11-24 RX ADMIN — HYDROMORPHONE HYDROCHLORIDE 1 MG: 1 INJECTION, SOLUTION INTRAMUSCULAR; INTRAVENOUS; SUBCUTANEOUS at 03:05

## 2019-11-24 RX ADMIN — HYDROMORPHONE HYDROCHLORIDE 1 MG: 1 INJECTION, SOLUTION INTRAMUSCULAR; INTRAVENOUS; SUBCUTANEOUS at 10:26

## 2019-11-24 RX ADMIN — OXYCODONE HYDROCHLORIDE 10 MG: 10 TABLET ORAL at 13:02

## 2019-11-24 RX ADMIN — METHOCARBAMOL TABLETS 500 MG: 500 TABLET, COATED ORAL at 11:50

## 2019-11-24 RX ADMIN — ONDANSETRON 4 MG: 2 INJECTION INTRAMUSCULAR; INTRAVENOUS at 00:33

## 2019-11-24 RX ADMIN — HYDROMORPHONE HYDROCHLORIDE 1 MG: 1 INJECTION, SOLUTION INTRAMUSCULAR; INTRAVENOUS; SUBCUTANEOUS at 00:39

## 2019-11-24 RX ADMIN — ACETAMINOPHEN 975 MG: 325 TABLET ORAL at 23:59

## 2019-11-24 RX ADMIN — HYDROMORPHONE HYDROCHLORIDE 1 MG: 1 INJECTION, SOLUTION INTRAMUSCULAR; INTRAVENOUS; SUBCUTANEOUS at 14:12

## 2019-11-24 RX ADMIN — OXYCODONE HYDROCHLORIDE 5 MG: 5 TABLET ORAL at 08:56

## 2019-11-24 RX ADMIN — ACETAMINOPHEN 975 MG: 325 TABLET ORAL at 14:17

## 2019-11-24 RX ADMIN — OXYCODONE HYDROCHLORIDE 10 MG: 10 TABLET ORAL at 17:25

## 2019-11-24 RX ADMIN — INFLUENZA A VIRUS A/MICHIGAN/45/2015 X-275 (H1N1) ANTIGEN (FORMALDEHYDE INACTIVATED), INFLUENZA A VIRUS A/SINGAPORE/INFIMH-16-0019/2016 IVR-186 (H3N2) ANTIGEN (FORMALDEHYDE INACTIVATED), AND INFLUENZA B VIRUS B/MARYLAND/15/2016 BX-69A (A B/COLORADO/6/2017-LIKE VIRUS) ANTIGEN (FORMALDEHYDE INACTIVATED) 0.5 ML: 60; 60; 60 INJECTION, SUSPENSION INTRAMUSCULAR at 11:50

## 2019-11-24 RX ADMIN — CHLORHEXIDINE GLUCONATE 0.12% ORAL RINSE 15 ML: 1.2 LIQUID ORAL at 08:31

## 2019-11-24 RX ADMIN — HYDROMORPHONE HYDROCHLORIDE 1 MG: 1 INJECTION, SOLUTION INTRAMUSCULAR; INTRAVENOUS; SUBCUTANEOUS at 05:32

## 2019-11-24 RX ADMIN — GABAPENTIN 300 MG: 300 CAPSULE ORAL at 23:59

## 2019-11-24 RX ADMIN — METHOCARBAMOL TABLETS 500 MG: 500 TABLET, COATED ORAL at 17:25

## 2019-11-24 RX ADMIN — ONDANSETRON 4 MG: 2 INJECTION INTRAMUSCULAR; INTRAVENOUS at 06:04

## 2019-11-24 RX ADMIN — LIDOCAINE 1 PATCH: 50 PATCH CUTANEOUS at 08:31

## 2019-11-24 RX ADMIN — HYDROMORPHONE HYDROCHLORIDE 1 MG: 1 INJECTION, SOLUTION INTRAMUSCULAR; INTRAVENOUS; SUBCUTANEOUS at 18:07

## 2019-11-24 RX ADMIN — ENOXAPARIN SODIUM 30 MG: 30 INJECTION SUBCUTANEOUS at 08:31

## 2019-11-24 NOTE — RESPIRATORY THERAPY NOTE
Pt  continues to desat to 88-90% on 6L NC  Pt c/o no dyspea or distress but still experiencing pain secondary to right shoulder dislocation and multiple rib fractures  Pt started on HFNC at 10L and spo2 now 92%  RN aware  Will continue to monitor and titrate accordingly

## 2019-11-24 NOTE — UTILIZATION REVIEW
Initial Clinical Review    Admission: Date/Time/Statement: Inpatient Admission Orders (From admission, onward)     Ordered        11/23/19 1849  Inpatient Admission  Once                   Orders Placed This Encounter   Procedures    Inpatient Admission     Standing Status:   Standing     Number of Occurrences:   1     Order Specific Question:   Admitting Physician     Answer:   Dee Brandon     Order Specific Question:   Level of Care     Answer:   Critical Care [15]     Order Specific Question:   Estimated length of stay     Answer:   More than 2 Midnights     Order Specific Question:   Certification     Answer:   I certify that inpatient services are medically necessary for this patient for a duration of greater than two midnights  See H&P and MD Progress Notes for additional information about the patient's course of treatment  ED Arrival Information     Expected Arrival Acuity Means of Arrival Escorted By Service Admission Type    - 11/23/2019 16:25 Immediate Ambulance [facility] ([address]) [address] [facility]    Arrival Complaint    -        Chief Complaint   Patient presents with    Fall     see trauma flow sheet     Assessment/Plan: 81 y o  female with no reported PMH/PSH who presents to ED as a level B trauma s/p fall down ~8 concrete steps  Patient presented with c/o pain to right shoulder and left flank  Patient also endorsed hitting head, + LOC  Patient denies any history of blood thinners  Imaging revealed anterior dislocation of the R shoulder and multiple left-sided rib fractures  Admit inpatient to ICU  Ortho consult, cont pox, O2 as needed currently on 3 lpm nc, IS q 1h while awake, neuro checks q4h, OOB 3x/day, I/O q2h, SCD's, up with assist, nursing dysphagia diet prior to po --> reg diet, PT/OT evals, consult acute pain management for c/o pain 10/10 in her R shoulder and L chest   Reduction to R arm done s/p sedation      Ortho consult 11/23 ---Right glenohumeral anterior dislocation  Patient neurologically and vascularly intact distally  This was a closed injury    Plan: NWB RUE, sling for comfort; analgesics, prn, SCD's for DVT ppx    11/24 --- pt with poor effort with IS d/t pain, now on HFNC @ 55L, FiO2 85%    ED Triage Vitals   Temperature Pulse Respirations Blood Pressure SpO2   11/23/19 1630 11/23/19 1630 11/23/19 1630 11/23/19 1630 11/23/19 1630   97 5 °F (36 4 °C) 79 (!) 24 122/67 97 %      Temp Source Heart Rate Source Patient Position - Orthostatic VS BP Location FiO2 (%)   11/23/19 1630 11/23/19 1630 11/23/19 1630 11/23/19 1825 --   Oral Monitor Lying Left arm       Pain Score       11/23/19 1825       No Pain        Wt Readings from Last 1 Encounters:   11/24/19 79 2 kg (174 lb 9 7 oz)     Additional Vital Signs:   Date/Time  Temp  Pulse  Resp  BP  MAP (mmHg)  SpO2  O2 Device  Patient Position - Orthostatic VS   11/24/19 1636            96 %       11/24/19 1200    100  19  103/67  81  94 %       11/24/19 1100    96  16  111/68  78  92 %       11/24/19 1000    100  22  123/66  84  94 %       11/24/19 0600    100  43Abnormal       89 %Abnormal        11/24/19 0300    98  23Abnormal   114/68  77  87 %Abnormal        11/24/19 0200    100  18  116/70  86  91 %       11/24/19 0100    98  20  108/66  82  89 %Abnormal        11/24/19 0000  98 8 °F (37 1 °C)  96  18  129/76  93  92 %  Nasal cannula  Sitting   11/23/19 2300    104  24Abnormal   121/77  92  92 %       11/23/19 2200    102  30Abnormal   134/78  101  94 %       11/23/19 2100    102  38Abnormal   131/75  90  93 %       11/23/19 2000  98 6 °F (37 °C)  90  33Abnormal   110/62  75  95 %       11/23/19 1948  98 6 °F (37 °C)  90  24Abnormal   92/68  84  94 %  Nasal cannula  Lying   11/23/19 18:30:17        160/115Abnormal            11/23/19 1830    78  16  172/92Abnormal       Nasal cannula  Lying   11/23/19 18:25:17        172/92Abnormal           11/23/19 1825    75  16  158/90    96 %  Nasal cannula  Lying   11/23/19 1715    76  20  175/72Abnormal   114  96 %       11/23/19 1700    82  18  181/80Abnormal   115  97 %       11/23/19 1645    77  18  189/85Abnormal     98 %       11/23/19 1635    74  18  176/91Abnormal     97 %    Lying   11/23/19 1630  97 5 °F (36 4 °C)  79  24Abnormal   122/67    97 %    Lying       Pertinent Labs/Diagnostic Test Results:   Results from last 7 days   Lab Units 11/23/19  1641 11/23/19  1632   WBC Thousand/uL 9 81  --    HEMOGLOBIN g/dL 12 4  --    I STAT HEMOGLOBIN g/dl  --  12 9   HEMATOCRIT % 39 4  --    HEMATOCRIT, ISTAT %  --  38   PLATELETS Thousands/uL 265  --    NEUTROS ABS Thousands/µL 6 85  --      Results from last 7 days   Lab Units 11/24/19  0536 11/23/19  1632   SODIUM mmol/L 141  --    POTASSIUM mmol/L 4 3  --    CHLORIDE mmol/L 110*  --    CO2 mmol/L 24  --    CO2, I-STAT mmol/L  --  25   ANION GAP mmol/L 7  --    BUN mg/dL 23  --    CREATININE mg/dL 0 98  --    EGFR ml/min/1 73sq m 60  --    CALCIUM mg/dL 8 3  --    CALCIUM, IONIZED mmol/L 1 11*  --    CALCIUM, IONIZED, ISTAT mmol/L  --  1 13   MAGNESIUM mg/dL 2 1  --    PHOSPHORUS mg/dL 4 3*  --      Results from last 7 days   Lab Units 11/24/19  0536   GLUCOSE RANDOM mg/dL 129     Results from last 7 days   Lab Units 11/23/19  1632   PH, CODY I-STAT  7 379   PCO2, CODY ISTAT mm HG 40 7*   PO2, CODY ISTAT mm HG 20 0*   HCO3, CODY ISTAT mmol/L 24 0   I STAT BASE EXC mmol/L -1   I STAT O2 SAT % 30*     Results from last 7 days   Lab Units 11/23/19  1641   PROTIME seconds 12 6   INR  0 98   PTT seconds 22*     ED Treatment:   Medication Administration from 11/23/2019 1618 to 11/23/2019 1947       Date/Time Order Dose Route Action     11/23/2019 1630 fentanyl citrate (PF) 100 MCG/2ML 50 mcg Intravenous Given     11/23/2019 1717 lidocaine (PF) (XYLOCAINE-MPF) 1 % injection 20 mL 20 mL Infiltration Given     11/23/2019 1701 fentanyl citrate (PF) 100 MCG/2ML 50 mcg Intravenous Given     11/23/2019 1806 HYDROmorphone (DILAUDID) injection 0 5 mg 0 5 mg Intravenous Given     11/23/2019 1825 etomidate (AMIDATE) 2 mg/mL injection 10 mg 10 mg Intravenous Given     History reviewed  No pertinent past medical history  Present on Admission:  **None**      Admitting Diagnosis: Multiple injuries [T07  XXXA]  Anterior shoulder dislocation, left, initial encounter [S43 015A]  Anterior shoulder dislocation, right, initial encounter [S43 014A]  Age/Sex: 81 y o  female  Admission Orders:  Scheduled Medications:  Medications:  acetaminophen 975 mg Oral Q8H [address]   chlorhexidine 15 mL Swish & Spit Q12H [address]   dextrose      enoxaparin 30 mg Subcutaneous Q12H [address]   gabapentin 300 mg Oral HS   lidocaine 1 patch Topical Daily   methocarbamol 500 mg Oral Q6H [address]        PRN Meds:  HYDROmorphone 1 mg Intravenous Q3H PRN x 6 doses   ondansetron 4 mg Intravenous Q6H PRN  x2 doses   oxyCODONE 10 mg Oral Q4H PRN  x1 dose   oxyCODONE 5 mg Oral Q4H PRN  x3 doses       IP CONSULT TO ORTHOPEDIC SURGERY  IP CONSULT TO CASE MANAGEMENT  IP CONSULT TO ACUTE PAIN SERVICE    Network Utilization Review Department  [email or web address]  ATTENTION: Please call with any questions or concerns to [phone] and carefully listen to the prompts so that you are directed to the right person  All voicemails are confidential   Dipti Man all requests for admission clinical reviews, approved or denied determinations and any other requests to dedicated fax number below belonging to the campus where the patient is receiving treatment    FACILITY NAME UR FAX NUMBER   ADMISSION DENIALS (Administrative/Medical Necessity) [phone]   PARENT CHILD HEALTH (Maternity/NICU/Pediatrics) [phone]     Keturah Seek [phone]   [facility] [address] [facility] [phone]   Lidia Socks [address] [phone]

## 2019-11-24 NOTE — NURSING NOTE
Pt only urinated 225ml of urine all day  Bladder scanned for 184ml of urine  Unable to urinate at this time  Pt is eating and drinking  Nelta Santana ANGEL aware  No new orders obtained at this time

## 2019-11-24 NOTE — PLAN OF CARE
Problem: Prexisting or High Potential for Compromised Skin Integrity  Goal: Skin integrity is maintained or improved  Description  INTERVENTIONS:  - Identify patients at risk for skin breakdown  - Assess and monitor skin integrity  - Assess and monitor nutrition and hydration status  - Monitor labs   - Assess for incontinence   - Turn and reposition patient  - Assist with mobility/ambulation  - Relieve pressure over bony prominences  - Avoid friction and shearing  - Provide appropriate hygiene as needed including keeping skin clean and dry  - Evaluate need for skin moisturizer/barrier cream  - Collaborate with interdisciplinary team   - Patient/family teaching  - Consider wound care consult   Outcome: Progressing     Problem: Potential for Falls  Goal: Patient will remain free of falls  Description  INTERVENTIONS:  - Assess patient frequently for physical needs  -  Identify cognitive and physical deficits and behaviors that affect risk of falls  -  [place] fall precautions as indicated by assessment   - Educate patient/family on patient safety including physical limitations  - Instruct patient to call for assistance with activity based on assessment  - Modify environment to reduce risk of injury  - Consider OT/PT consult to assist with strengthening/mobility  Outcome: Progressing     Problem: NEUROSENSORY - ADULT  Goal: Achieves maximal functionality and self care  Description  INTERVENTIONS  - Monitor swallowing and airway patency with patient fatigue and changes in neurological status  - Encourage and assist patient to increase activity and self care     - Encourage visually impaired, hearing impaired and aphasic patients to use assistive/communication devices  Outcome: Progressing     Problem: CARDIOVASCULAR - ADULT  Goal: Maintains optimal cardiac output and hemodynamic stability  Description  INTERVENTIONS:  - Monitor I/O, vital signs and rhythm  - Monitor for S/S and trends of decreased cardiac output  - Administer and titrate ordered vasoactive medications to optimize hemodynamic stability  - Assess quality of pulses, skin color and temperature  - Assess for signs of decreased coronary artery perfusion  - Instruct patient to report change in severity of symptoms  Outcome: Progressing     Problem: RESPIRATORY - ADULT  Goal: Achieves optimal ventilation and oxygenation  Description  INTERVENTIONS:  - Assess for changes in respiratory status  - Assess for changes in mentation and behavior  - Position to facilitate oxygenation and minimize respiratory effort  - Oxygen administered by appropriate delivery if ordered  - Initiate smoking cessation education as indicated  - Encourage broncho-pulmonary hygiene including cough, deep breathe, Incentive Spirometry  - Assess the need for suctioning and aspirate as needed  - Assess and instruct to report SOB or any respiratory difficulty  - Respiratory Therapy support as indicated  Outcome: Progressing     Problem: GENITOURINARY - ADULT  Goal: Maintains or returns to baseline urinary function  Description  INTERVENTIONS:  - Assess urinary function  - Encourage oral fluids to ensure adequate hydration if ordered  - Administer IV fluids as ordered to ensure adequate hydration  - Administer ordered medications as needed  - Offer frequent toileting  - Follow urinary retention protocol if ordered  Outcome: Progressing     Problem: METABOLIC, FLUID AND ELECTROLYTES - ADULT  Goal: Electrolytes maintained within normal limits  Description  INTERVENTIONS:  - Monitor labs and assess patient for signs and symptoms of electrolyte imbalances  - Administer electrolyte replacement as ordered  - Monitor response to electrolyte replacements, including repeat lab results as appropriate  - Instruct patient on fluid and nutrition as appropriate  Outcome: Progressing  Goal: Fluid balance maintained  Description  INTERVENTIONS:  - Monitor labs   - Monitor I/O and WT  - Instruct patient on fluid and nutrition as appropriate  - Assess for signs & symptoms of volume excess or deficit  Outcome: Progressing     Problem: SKIN/TISSUE INTEGRITY - ADULT  Goal: Skin integrity remains intact  Description  INTERVENTIONS  - Identify patients at risk for skin breakdown  - Assess and monitor skin integrity  - Assess and monitor nutrition and hydration status  - Monitor labs (i e  albumin)  - Assess for incontinence   - Turn and reposition patient  - Assist with mobility/ambulation  - Relieve pressure over bony prominences  - Avoid friction and shearing  - Provide appropriate hygiene as needed including keeping skin clean and dry  - Evaluate need for skin moisturizer/barrier cream  - Collaborate with interdisciplinary team (i e  Nutrition, Rehabilitation, etc )   - Patient/family teaching  Outcome: Progressing     Problem: HEMATOLOGIC - ADULT  Goal: Maintains hematologic stability  Description  INTERVENTIONS  - Assess for signs and symptoms of bleeding or hemorrhage  - Monitor labs  - Administer supportive blood products/factors as ordered and appropriate  Outcome: Progressing     Problem: MUSCULOSKELETAL - ADULT  Goal: Maintain or return mobility to safest level of function  Description  INTERVENTIONS:  - Assess patient's ability to carry out ADLs; assess patient's baseline for ADL function and identify physical deficits which impact ability to perform ADLs (bathing, care of mouth/teeth, toileting, grooming, dressing, etc )  - Assess/evaluate cause of self-care deficits   - Assess range of motion  - Assess patient's mobility  - Assess patient's need for assistive devices and provide as appropriate  - Encourage maximum independence but intervene and supervise when necessary  - Involve family in performance of ADLs  - Assess for home care needs following discharge   - Consider OT consult to assist with ADL evaluation and planning for discharge  - Provide patient education as appropriate  Outcome: Progressing     Problem: Nutrition/Hydration-ADULT  Goal: Nutrient/Hydration intake appropriate for improving, restoring or maintaining nutritional needs  Description  Monitor and assess patient's nutrition/hydration status for malnutrition  Collaborate with interdisciplinary team and initiate plan and interventions as ordered  Monitor patient's weight and dietary intake as ordered or per policy  Utilize nutrition screening tool and intervene as necessary  Determine patient's food preferences and provide high-protein, high-caloric foods as appropriate       INTERVENTIONS:  - Monitor oral intake, urinary output, labs, and treatment plans  - Assess nutrition and hydration status and recommend course of action  - Evaluate amount of meals eaten  - Assist patient with eating if necessary   - Allow adequate time for meals  - Recommend/ encourage appropriate diets, oral nutritional supplements, and vitamin/mineral supplements  - Order, calculate, and assess calorie counts as needed  - Recommend, monitor, and adjust tube feedings and TPN/PPN based on assessed needs  - Assess need for intravenous fluids  - Provide specific nutrition/hydration education as appropriate  - Include patient/family/caregiver in decisions related to nutrition  Outcome: Progressing     Problem: PAIN - ADULT  Goal: Verbalizes/displays adequate comfort level or baseline comfort level  Description  Interventions:  - Encourage patient to monitor pain and request assistance  - Assess pain using appropriate pain scale  - Administer analgesics based on type and severity of pain and evaluate response  - Implement non-pharmacological measures as appropriate and evaluate response  - Consider cultural and social influences on pain and pain management  - Notify physician/advanced practitioner if interventions unsuccessful or patient reports new pain  Outcome: Progressing     Problem: INFECTION - ADULT  Goal: Absence or prevention of progression during hospitalization  Description  INTERVENTIONS:  - Assess and monitor for signs and symptoms of infection  - Monitor lab/diagnostic results  - Monitor all insertion sites, i e  indwelling lines, tubes, and drains  - Monitor endotracheal if appropriate and nasal secretions for changes in amount and color  - [place] appropriate cooling/warming therapies per order  - Administer medications as ordered  - Instruct and encourage patient and family to use good hand hygiene technique  - Identify and instruct in appropriate isolation precautions for identified infection/condition  Outcome: Progressing  Goal: Absence of fever/infection during neutropenic period  Description  INTERVENTIONS:  - Monitor WBC    Outcome: Progressing     Problem: SAFETY ADULT  Goal: Maintain or return to baseline ADL function  Description  INTERVENTIONS:  -  Assess patient's ability to carry out ADLs; assess patient's baseline for ADL function and identify physical deficits which impact ability to perform ADLs (bathing, care of mouth/teeth, toileting, grooming, dressing, etc )  - Assess/evaluate cause of self-care deficits   - Assess range of motion  - Assess patient's mobility; develop plan if impaired  - Assess patient's need for assistive devices and provide as appropriate  - Encourage maximum independence but intervene and supervise when necessary  - Involve family in performance of ADLs  - Assess for home care needs following discharge   - Consider OT consult to assist with ADL evaluation and planning for discharge  - Provide patient education as appropriate  Outcome: Progressing  Goal: Maintain or return mobility status to optimal level  Description  INTERVENTIONS:  - Assess patient's baseline mobility status (ambulation, transfers, stairs, etc )    - Identify cognitive and physical deficits and behaviors that affect mobility  - Identify mobility aids required to assist with transfers and/or ambulation (gait belt, sit-to-stand, lift, walker, cane, etc )  - [place] fall precautions as indicated by assessment  - Record patient progress and toleration of activity level on Mobility SBAR; progress patient to next Phase/Stage  - Instruct patient to call for assistance with activity based on assessment  - Consider rehabilitation consult to assist with strengthening/weightbearing, etc   Outcome: Progressing     Problem: DISCHARGE PLANNING  Goal: Discharge to home or other facility with appropriate resources  Description  INTERVENTIONS:  - Identify barriers to discharge w/patient and caregiver  - Arrange for needed discharge resources and transportation as appropriate  - Identify discharge learning needs (meds, wound care, etc )  - Arrange for interpretive services to assist at discharge as needed  - Refer to Case Management Department for coordinating discharge planning if the patient needs post-hospital services based on physician/advanced practitioner order or complex needs related to functional status, cognitive ability, or social support system  Outcome: Progressing     Problem: Knowledge Deficit  Goal: Patient/family/caregiver demonstrates understanding of disease process, treatment plan, medications, and discharge instructions  Description  Complete learning assessment and assess knowledge base    Interventions:  - Provide teaching at level of understanding  - Provide teaching via preferred learning methods  Outcome: Progressing     Problem: COPING  Goal: Pt/Family able to verbalize concerns and demonstrate effective coping strategies  Description  INTERVENTIONS:  - Assist patient/family to identify coping skills, available support systems and cultural and spiritual values  - Provide emotional support, including active listening and acknowledgement of concerns of patient and caregivers  - Reduce environmental stimuli, as able  - Provide patient education  - Assess for spiritual pain/suffering and initiate spiritual care, including notification of Pastoral Care or matt based community as needed  - Assess effectiveness of coping strategies  Outcome: Progressing  Goal: Will report anxiety at manageable levels  Description  INTERVENTIONS:  - Administer medication as ordered  - Teach and encourage coping skills  - Provide emotional support  - Assess patient/family for anxiety and ability to cope  Outcome: Progressing

## 2019-11-24 NOTE — RESPIRATORY THERAPY NOTE
RT Protocol Note  Slime Borges 81 y o  female MRN: [id number]  Unit/Bed#: ICU 14 Encounter: [id number]    Assessment    Principal Problem:    Anterior shoulder dislocation, right, initial encounter  Active Problems:    Multiple fractures of ribs, left side, initial encounter for closed fracture      Home Pulmonary Medications:    Home Devices/Therapy: (None)    History reviewed  No pertinent past medical history  Social History     Socioeconomic History    Marital status: /Civil Union     Spouse name: None    Number of children: None    Years of education: None    Highest education level: None   Occupational History    None   Social Needs    Financial resource strain: None    Food insecurity:     Worry: None     Inability: None    Transportation needs:     Medical: None     Non-medical: None   Tobacco Use    Smoking status: Never Smoker   Substance and Sexual Activity    Alcohol use: Not Currently    Drug use: Not Currently    Sexual activity: None   Lifestyle    Physical activity:     Days per week: None     Minutes per session: None    Stress: None   Relationships    Social connections:     Talks on phone: None     Gets together: None     Attends [employer] service: None     Active member of club or organization: None     Attends meetings of clubs or organizations: None     Relationship status: None    Intimate partner violence:     Fear of current or ex partner: None     Emotionally abused: None     Physically abused: None     Forced sexual activity: None   Other Topics Concern    None   Social History Narrative    None       Subjective         Objective    Physical Exam:   Assessment Type: Assess only  General Appearance: Alert, Awake  Respiratory Pattern: Shallow, Tachypneic  Chest Assessment: Chest expansion symmetrical  Bilateral Breath Sounds: Clear, Diminished  Cough: None  O2 Device: 3L NC    Vitals:  Blood pressure 110/62, pulse 90, temperature 98 6 °F (37 °C), resp   rate (!) 33, height 5' 2" (1 575 m), weight 79 4 kg (175 lb 0 7 oz), SpO2 94 %  Imaging and other studies: I have personally reviewed pertinent reports  O2 Device: 3L NC     Plan       Airway Clearance Plan: Incentive Spirometer     Resp Comments: (P) Pt  admit s/p fall down steps with right shoulder dislocation and multiple left sided rib fractures  She is English speaking and family at bedside is able to translate  She has no known pulmonary history and takes no respiratory meds at home  Her breath sounds are clear/diminished and CT of chest shows bibasilar atelectasis but no other pulmonary disease  Pt is experiencing an extreme amount of pain and taking rapid/shallow breaths  She is sating mid 80s on 3L NC  She is unable to perform IS therapy at this time  RN at bedside aware of patient pain  Will initiate and encourage IS per AW clearance protocol when able  No other respiratory intervention required

## 2019-11-24 NOTE — RESPIRATORY THERAPY NOTE
RT Protocol Note  Bethanie Rios 81 y o  female MRN: [id number]  Unit/Bed#: ICU 14 Encounter: [id number]    Assessment    Principal Problem:    Anterior shoulder dislocation, right, initial encounter  Active Problems:    Multiple fractures of ribs, left side, initial encounter for closed fracture      Home Pulmonary Medications:    Home Devices/Therapy: (None)    History reviewed  No pertinent past medical history    Social History     Socioeconomic History    Marital status: /Civil Union     Spouse name: None    Number of children: None    Years of education: None    Highest education level: None   Occupational History    None   Social Needs    Financial resource strain: None    Food insecurity:     Worry: None     Inability: None    Transportation needs:     Medical: None     Non-medical: None   Tobacco Use    Smoking status: Never Smoker   Substance and Sexual Activity    Alcohol use: Not Currently    Drug use: Not Currently    Sexual activity: None   Lifestyle    Physical activity:     Days per week: None     Minutes per session: None    Stress: None   Relationships    Social connections:     Talks on phone: None     Gets together: None     Attends [employer] witness service: None     Active member of club or organization: None     Attends meetings of clubs or organizations: None     Relationship status: None    Intimate partner violence:     Fear of current or ex partner: None     Emotionally abused: None     Physically abused: None     Forced sexual activity: None   Other Topics Concern    None   Social History Narrative    None       Subjective         Objective    Physical Exam:   Assessment Type: Assess only  General Appearance: Alert, Awake  Respiratory Pattern: Shallow  Chest Assessment: Chest expansion symmetrical  Bilateral Breath Sounds: Clear, Diminished  Cough: None  O2 Device: HFNC    Vitals:  Blood pressure 141/77, pulse 102, temperature 98 8 °F (37 1 °C), temperature source Oral, resp  rate (!) 24, height 5' 2" (1 575 m), weight 79 2 kg (174 lb 9 7 oz), SpO2 92 %  Imaging and other studies: I have personally reviewed pertinent reports  O2 Device: HFNC     Plan    Respiratory Plan: Vent/NIV/HFNC  Airway Clearance Plan: Incentive Spirometer     Resp Comments: (P) Pt desat to 86% on 12L NC  Pt transitioned to HFNC 85% and 55L and reassessed per respiratory protocol  She continues to experience pain with periods of rapid/shallow breathing and recently vomited  Her BS are clear bilaterally and family at bedside confirms she has no pulmonary history and takes no respiratory meds at home  No indication for bronchodilators at this time  Will continue to monitor and per protocol and titrate FiO2 as tolerated

## 2019-11-24 NOTE — DISCHARGE INSTRUCTIONS
Discharge Instructions - [facility] [place] 81 [facility] o  female MRN: [id number]  Unit/Bed#: ICU 14-01    Weight Bearing Status:                                           No weight bearing to right arm, maintain sling, avoid lifting with right arm however may perform elbow and wrist range of motion exercises     Pain:  Continue analgesics as directed    PT/OT:  Continue PT/OT on outpatient basis as directed at your follow up appointment     Appt Instructions: If you do not have your appointment, please call the clinic at [phone] to f/u with Dr Isabell Deal in 3 weeks   Otherwise followup as scheduled     Contact the office sooner if you experience any increased numbness/tingling in the extremities  Miscellaneous:    Rib Fracture Discharge Instructions: Your rib fractures will take time to heal  Do not do any strenuous activity or lift any thing more than 10 pounds until you are cleared to do so by the Trauma clinic  Take your pain medication, if needed, as prescribed and make sure you continue to perform cough and deep breathing exercises and use your incentive spirometer every two hours while awake to maintain healthy lungs post injury  You should be seen in the Trauma Clinic 2-3 weeks after being discharged  Please call the Trauma Clinic sooner if you have any questions or concerns or if you experience increased work of breathing, shortness of breath, difficulty breathing, activity intolerance or chest pain

## 2019-11-24 NOTE — RESPIRATORY THERAPY NOTE
resp care      11/24/19 0905   Respiratory Protocol   Language Barrier? Yes   Respiratory Assessment   Assessment Type Assess only   General Appearance Alert; Awake   Respiratory Pattern Normal   Chest Assessment Chest expansion symmetrical   Resp Comments Pt requires to need hfnc  Pt has poor effort with IS due to pain  Pt instructed on ezpap for reversal of atelectais  Will cont to follow per acp      O2 Device HFNC   Additional Assessments   Pulse 101   Respirations (!) 27   SpO2 93 %

## 2019-11-24 NOTE — PROGRESS NOTES
Progress Note - [address] [age] y o  female MRN: [id number]  Unit/Bed#: ICU 14 Encounter: [id number]    Assessment: 82 y/o F s/p fall down 8 concrete stairs,with R anterior shoulder dislocation with Hill-Sachs and Bankart fx, multiple L-sided rib  fx    Plan:          Neuro:     --GCS 15, A&O x4    --Stable, continue to monitor                 CV:       --Hemodynamically stable      --Maintain MAP >56                 Lung:       --Placed on HFNC this AM      --Maintain SpO2>92%      --Aggressive pulmonary toilet, IS      --f/u AM CXR, PA/Lat ordered 24h after admission                 GI:      --No acute issues                 FEN:      --Fluids: n/a     --Replete electrolytes PRN     --Nutrition: Regular diet                 :      --Strict I/Os                 ID:      --No active issues                 Heme:       --Trend H/H      --Transfuse PRN for Hgb <7                 Endo:      --No active issues                            Msk/Skin:       --R anterior shoulder dislocation with Hill-Sachs and Bankart fx, reduced at bedside by Orthopedics      --Sling to be applied to RUE, Ortho will continue to follow      --Multiple L-sided rib fx---Rib fx protocol, APS c/s for possible epidural placement      --Routine skin care      --PT/OT                 Disposition: Continue Critical Care      HPI/24hr events:     No acute events overnight  This morning, pt continuing to c/o L-sided chest pain  Physical Exam:     GEN: NAD  HEENT: MMM  Chest: L chest tenderness  CV: RRR  Lung: on HFNC  Ab: Soft, NT/ND  Extrem: RUE in sling  Neuro:  A+Ox3, motor and sensation grossly intact      Vitals:    19 0600 19 0603 19 0610 19 0618   BP:   141/77    BP Location:       Pulse: 100  102    Resp: (!) 43  (!) 24    Temp:       TempSrc:       SpO2: (!) 89% 92% (!) 86% 92%   Weight: 79 2 kg (174 lb 9 7 oz)      Height:                 Temperature:   Temp (24hrs), Av 4 °F (36 9 °C), Min:97 5 °F (36 4 °C), Max:98 8 °F (37 1 °C)    Current: Temperature: 98 8 °F (37 1 °C)    Weights:   IBW: 50 1 kg    Body mass index is 31 94 kg/m²  Weight (last 2 days)     Date/Time   Weight    11/24/19 0600   79 2 (174 6)    11/23/19 1948   79 4 (175 05)    11/23/19 1630   80 8 (178 13)              Hemodynamic Monitoring:  N/A     Non-Invasive/Invasive Ventilation Settings:  Respiratory    Lab Data (Last 4 hours)    None         O2/Vent Data (Last 4 hours)      11/24 0349 11/24 0603 11/24 0618      Non-Invasive Ventilation Mode HFNC HFNC HFNC               No results found for: PHART, IGI7UKD, PO2ART, RUQ2FBA, S2XLUYGY, BEART, SOURCE  SpO2: SpO2: 92 %    Intake and Outputs:  I/O       11/22 0701 - 11/23 0700 11/23 0701 - 11/24 0700 11/24 0701 - 11/25 0700    I V  (mL/kg)  5 (0 1)     Total Intake(mL/kg)  5 (0 1)     Urine (mL/kg/hr)  300     Emesis/NG output  575     Total Output  875     Net  -870                Nutrition:        Diet Orders   (From admission, onward)             Start     Ordered    11/23/19 1852  Diet Regular; Regular House  Diet effective now     Question Answer Comment   Diet Type Regular    Regular Regular House    RD to adjust diet per protocol?  Yes        11/23/19 1851                Labs:   Results from last 7 days   Lab Units 11/23/19  1641 11/23/19  1632   WBC Thousand/uL 9 81  --    HEMOGLOBIN g/dL 12 4  --    I STAT HEMOGLOBIN g/dl  --  12 9   HEMATOCRIT % 39 4  --    HEMATOCRIT, ISTAT %  --  38   PLATELETS Thousands/uL 265  --    NEUTROS PCT % 69  --    MONOS PCT % 6  --     Results from last 7 days   Lab Units 11/24/19  0536 11/23/19  1632   SODIUM mmol/L 141  --    POTASSIUM mmol/L 4 3  --    CHLORIDE mmol/L 110*  --    CO2 mmol/L 24  --    CO2, I-STAT mmol/L  --  25   BUN mg/dL 23  --    CREATININE mg/dL 0 98  --    CALCIUM mg/dL 8 3  --    GLUCOSE, ISTAT mg/dl  --  130     Results from last 7 days   Lab Units 11/24/19  0536   MAGNESIUM mg/dL 2 1     Results from last 7 days Lab Units 11/24/19  0536   PHOSPHORUS mg/dL 4 3*      Results from last 7 days   Lab Units 11/23/19  1641   INR  0 98   PTT seconds 22*         No results found for: TROPONINI    Imaging:     CT shoulder right wo contrast   Final Result by Alyse Freeman MD (11/23 1957)      1  Interval reduction of anterior dislocation with normal alignment  2   Hill-Sachs and osseous Bankart fractures again seen  Bankart fragment is displaced inferomedially  Workstation performed: [id number]         CT head without contrast   Final Result by Alyse Freeman MD (11/23 1718)      Large posterior scalp hematoma and laceration without subjacent skull fracture, intracranial hemorrhage, or other acute intracranial findings  I personally discussed this study with Brianna FREEMAN on 11/23/2019 at 5:09 PM                   Workstation performed: [id number]         CT spine cervical without contrast   Final Result by Alyse Freeman MD (11/23 1718)      No cervical spine fracture or traumatic malalignment  I personally discussed this study with Brianna FREEMAN on 11/23/2019 at 5:09 PM           Workstation performed: [id number]         CT chest abdomen pelvis w contrast   Final Result by Alyse Freeman MD (11/23 1717)      1  Multiple left-sided rib fractures, as above  Flail segment at the lateral and posterior 8th and 9th ribs  Shaft width displacement of the posterior left 10th rib with minimal subpleural hematoma  No pneumothorax, hemothorax, pulmonary contusion,    or visceral injuries in the left upper quadrant of the abdomen  2   Anterior subcoracoid humeral dislocation with associated Hill-Sachs and osseous Bankart fractures  I personally discussed this study with Brianna FREEMAN on 11/23/2019 at 5:09 PM                Workstation performed: [id number]         XR trauma multiple   Final Result by Alyse Freeman MD (11/23 1722)      1    Known left-sided rib fractures are not well demonstrated radiographically  Mild atelectasis the left lower lobe from splinting  No contusion, pneumothorax, or hemothorax  2   Right glenohumeral dislocation  3   Normal pelvis  I personally discussed this study with Marli FREEMAN on 11/23/2019 at 5:09 PM       Workstation performed: [id number]         XR chest 1 view    (Results Pending)   XR pelvis ap only 1 or 2 vw    (Results Pending)   XR shoulder 1 vw right    (Results Pending)   XR chest pa & lateral (24 hours after admission)    (Results Pending)   XR chest portable    (Results Pending)         Micro:  No results found for: Gianni , WOUNDCULT, SPUTUMCULTUR    Allergies: No Known Allergies    Medications:   Scheduled Meds:  Current Facility-Administered Medications:  acetaminophen 975 mg Oral [facility] Esequiel Rausch MD   chlorhexidine 15 mL Swish [employer] Spit Q12H [address] Esequiel Rausch MD   dextrose       gabapentin 300 mg Oral HS Esequiel Rausch MD   HYDROmorphone 1 mg Intravenous Q3H PRN Esequiel Rausch MD   lidocaine 1 patch Topical Daily Esequiel Rausch MD   methocarbamol 500 mg Oral Q6H [address] Esequiel Rausch MD   ondansetron 4 mg Intravenous Q6H PRN Esequiel Rausch MD   oxyCODONE 10 mg Oral Q4H PRN Esequiel Rausch MD   oxyCODONE 5 mg Oral Q4H PRN Esequiel Rausch MD     Continuous Infusions:   PRN Meds:    HYDROmorphone 1 mg Q3H PRN   ondansetron 4 mg Q6H PRN   oxyCODONE 10 mg Q4H PRN   oxyCODONE 5 mg Q4H PRN       VTE Pharmacologic Prophylaxis: Enoxaparin (Lovenox)  VTE Mechanical Prophylaxis: sequential compression device    Invasive lines and devices: Invasive Devices     Peripheral Intravenous Line            Peripheral IV 11/23/19 Left Antecubital less than 1 day    Peripheral IV 11/23/19 Left Wrist less than 1 day                   Counseling / Coordination of Care  Total Critical Care time spent 30 minutes excluding procedures, teaching and family updates         Code Status: Level 1 - Full Code     Portions of the record may have been created with voice recognition software  Occasional wrong word or "sound a like" substitutions may have occurred due to the inherent limitations of voice recognition software  Read the chart carefully and recognize, using context, where substitutions have occurred       Lakshmi Henry MD

## 2019-11-25 ENCOUNTER — ANESTHESIA EVENT (INPATIENT)
Dept: ANESTHESIOLOGY | Facility: HOSPITAL | Age: 69
DRG: 963 | End: 2019-11-25
Payer: COMMERCIAL

## 2019-11-25 ENCOUNTER — ANESTHESIA (INPATIENT)
Dept: ANESTHESIOLOGY | Facility: HOSPITAL | Age: 69
DRG: 963 | End: 2019-11-25
Payer: COMMERCIAL

## 2019-11-25 ENCOUNTER — APPOINTMENT (INPATIENT)
Dept: RADIOLOGY | Facility: HOSPITAL | Age: 69
DRG: 963 | End: 2019-11-25
Payer: COMMERCIAL

## 2019-11-25 PROBLEM — G89.11 ACUTE PAIN DUE TO TRAUMA: Status: ACTIVE | Noted: 2019-11-25
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
## 2019-11-25 PROBLEM — J96.01 ACUTE RESPIRATORY FAILURE WITH HYPOXIA (HCC): Status: ACTIVE | Noted: 2019-11-25

## 2019-11-25 PROBLEM — D72.829 LEUKOCYTOSIS: Status: ACTIVE | Noted: 2019-11-25

## 2019-11-25 LAB
ARTERIAL PATENCY WRIST A: YES
BASE EXCESS BLDA CALC-SCNC: -3 MMOL/L
HCO3 BLDA-SCNC: 22.9 MMOL/L (ref 22–28)
HFNC FLOW LPM: 60
NON VENT HFNC FIO2: 100
NON VENT TYPE HFNC: ABNORMAL
O2 CT BLDA-SCNC: 16.5 ML/DL (ref 16–23)
OXYHGB MFR BLDA: 94.9 % (ref 94–97)
PCO2 BLDA: 44.3 MM HG (ref 36–44)
PH BLDA: 7.33 [PH] (ref 7.35–7.45)
PO2 BLDA: 83.3 MM HG (ref 75–129)
SPECIMEN SOURCE: ABNORMAL

## 2019-11-25 PROCEDURE — G8989 SELF CARE D/C STATUS: HCPCS

## 2019-11-25 PROCEDURE — 94760 N-INVAS EAR/PLS OXIMETRY 1: CPT | Performed by: SOCIAL WORKER

## 2019-11-25 PROCEDURE — 3E0R3BZ INTRODUCTION OF ANESTHETIC AGENT INTO SPINAL CANAL, PERCUTANEOUS APPROACH: ICD-10-PCS | Performed by: ANESTHESIOLOGY

## 2019-11-25 PROCEDURE — G8988 SELF CARE GOAL STATUS: HCPCS

## 2019-11-25 PROCEDURE — 97163 PT EVAL HIGH COMPLEX 45 MIN: CPT

## 2019-11-25 PROCEDURE — 94660 CPAP INITIATION&MGMT: CPT | Performed by: SOCIAL WORKER

## 2019-11-25 PROCEDURE — 94668 MNPJ CHEST WALL SBSQ: CPT | Performed by: SOCIAL WORKER

## 2019-11-25 PROCEDURE — 82805 BLOOD GASES W/O2 SATURATION: CPT | Performed by: PHYSICIAN ASSISTANT

## 2019-11-25 PROCEDURE — 99291 CRITICAL CARE FIRST HOUR: CPT | Performed by: EMERGENCY MEDICINE

## 2019-11-25 PROCEDURE — 97167 OT EVAL HIGH COMPLEX 60 MIN: CPT

## 2019-11-25 PROCEDURE — G8978 MOBILITY CURRENT STATUS: HCPCS

## 2019-11-25 PROCEDURE — G8979 MOBILITY GOAL STATUS: HCPCS

## 2019-11-25 PROCEDURE — 94760 N-INVAS EAR/PLS OXIMETRY 1: CPT

## 2019-11-25 PROCEDURE — C9290 INJ, BUPIVACAINE LIPOSOME: HCPCS | Performed by: ANESTHESIOLOGY

## 2019-11-25 PROCEDURE — 94668 MNPJ CHEST WALL SBSQ: CPT

## 2019-11-25 RX ORDER — SODIUM CHLORIDE, SODIUM GLUCONATE, SODIUM ACETATE, POTASSIUM CHLORIDE, MAGNESIUM CHLORIDE, SODIUM PHOSPHATE, DIBASIC, AND POTASSIUM PHOSPHATE .53; .5; .37; .037; .03; .012; .00082 G/100ML; G/100ML; G/100ML; G/100ML; G/100ML; G/100ML; G/100ML
50 INJECTION, SOLUTION INTRAVENOUS CONTINUOUS
Status: DISCONTINUED | OUTPATIENT
Start: 2019-11-25 | End: 2019-11-25

## 2019-11-25 RX ORDER — PREDNISONE 10 MG/1
10 TABLET ORAL DAILY
Status: DISCONTINUED | OUTPATIENT
Start: 2019-11-26 | End: 2019-12-02 | Stop reason: HOSPADM

## 2019-11-25 RX ORDER — HYDROMORPHONE HCL/PF 1 MG/ML
0.5 SYRINGE (ML) INJECTION
Status: DISCONTINUED | OUTPATIENT
Start: 2019-11-25 | End: 2019-12-02

## 2019-11-25 RX ORDER — OXYCODONE HYDROCHLORIDE 5 MG/1
2.5 TABLET ORAL EVERY 4 HOURS PRN
Status: DISCONTINUED | OUTPATIENT
Start: 2019-11-25 | End: 2019-11-29

## 2019-11-25 RX ORDER — FUROSEMIDE 20 MG/1
20 TABLET ORAL ONCE
Status: COMPLETED | OUTPATIENT
Start: 2019-11-25 | End: 2019-11-25

## 2019-11-25 RX ORDER — MIDAZOLAM HYDROCHLORIDE 2 MG/2ML
2 INJECTION, SOLUTION INTRAMUSCULAR; INTRAVENOUS ONCE
Status: COMPLETED | OUTPATIENT
Start: 2019-11-25 | End: 2019-11-25

## 2019-11-25 RX ORDER — SERTRALINE HYDROCHLORIDE 100 MG/1
100 TABLET, FILM COATED ORAL DAILY
Status: DISCONTINUED | OUTPATIENT
Start: 2019-11-26 | End: 2019-12-02 | Stop reason: HOSPADM

## 2019-11-25 RX ORDER — OXYCODONE HYDROCHLORIDE 5 MG/1
5 TABLET ORAL EVERY 4 HOURS PRN
Status: DISCONTINUED | OUTPATIENT
Start: 2019-11-25 | End: 2019-11-29

## 2019-11-25 RX ORDER — BUPIVACAINE HYDROCHLORIDE 2.5 MG/ML
INJECTION, SOLUTION INFILTRATION; PERINEURAL
Status: COMPLETED | OUTPATIENT
Start: 2019-11-25 | End: 2019-11-25

## 2019-11-25 RX ORDER — BISACODYL 10 MG
10 SUPPOSITORY, RECTAL RECTAL DAILY PRN
Status: DISCONTINUED | OUTPATIENT
Start: 2019-11-25 | End: 2019-12-02 | Stop reason: HOSPADM

## 2019-11-25 RX ORDER — SODIUM CHLORIDE, SODIUM GLUCONATE, SODIUM ACETATE, POTASSIUM CHLORIDE, MAGNESIUM CHLORIDE, SODIUM PHOSPHATE, DIBASIC, AND POTASSIUM PHOSPHATE .53; .5; .37; .037; .03; .012; .00082 G/100ML; G/100ML; G/100ML; G/100ML; G/100ML; G/100ML; G/100ML
50 INJECTION, SOLUTION INTRAVENOUS CONTINUOUS
Status: DISCONTINUED | OUTPATIENT
Start: 2019-11-25 | End: 2019-11-26

## 2019-11-25 RX ORDER — SODIUM CHLORIDE, SODIUM GLUCONATE, SODIUM ACETATE, POTASSIUM CHLORIDE, MAGNESIUM CHLORIDE, SODIUM PHOSPHATE, DIBASIC, AND POTASSIUM PHOSPHATE .53; .5; .37; .037; .03; .012; .00082 G/100ML; G/100ML; G/100ML; G/100ML; G/100ML; G/100ML; G/100ML
500 INJECTION, SOLUTION INTRAVENOUS ONCE
Status: COMPLETED | OUTPATIENT
Start: 2019-11-25 | End: 2019-11-25

## 2019-11-25 RX ORDER — FENTANYL CITRATE/PF 50 MCG/ML
100 SYRINGE (ML) INJECTION ONCE
Status: COMPLETED | OUTPATIENT
Start: 2019-11-25 | End: 2019-11-25

## 2019-11-25 RX ORDER — DOCUSATE SODIUM 100 MG/1
100 CAPSULE, LIQUID FILLED ORAL 2 TIMES DAILY
Status: DISCONTINUED | OUTPATIENT
Start: 2019-11-25 | End: 2019-12-02 | Stop reason: HOSPADM

## 2019-11-25 RX ORDER — HEPARIN SODIUM 5000 [USP'U]/ML
5000 INJECTION, SOLUTION INTRAVENOUS; SUBCUTANEOUS EVERY 12 HOURS SCHEDULED
Status: DISCONTINUED | OUTPATIENT
Start: 2019-11-25 | End: 2019-12-02 | Stop reason: HOSPADM

## 2019-11-25 RX ADMIN — BUPIVACAINE 20 ML: 13.3 INJECTION, SUSPENSION, LIPOSOMAL INFILTRATION at 10:32

## 2019-11-25 RX ADMIN — SENNOSIDES 8.6 MG: 8.6 TABLET, FILM COATED ORAL at 22:01

## 2019-11-25 RX ADMIN — METHOCARBAMOL TABLETS 500 MG: 500 TABLET, COATED ORAL at 12:45

## 2019-11-25 RX ADMIN — METHOCARBAMOL TABLETS 500 MG: 500 TABLET, COATED ORAL at 18:32

## 2019-11-25 RX ADMIN — LIDOCAINE 1 PATCH: 50 PATCH CUTANEOUS at 09:38

## 2019-11-25 RX ADMIN — ACETAMINOPHEN 975 MG: 325 TABLET ORAL at 22:01

## 2019-11-25 RX ADMIN — OXYCODONE HYDROCHLORIDE 10 MG: 10 TABLET ORAL at 09:38

## 2019-11-25 RX ADMIN — OXYCODONE HYDROCHLORIDE 10 MG: 10 TABLET ORAL at 00:08

## 2019-11-25 RX ADMIN — OXYCODONE HYDROCHLORIDE 10 MG: 10 TABLET ORAL at 04:36

## 2019-11-25 RX ADMIN — SODIUM CHLORIDE, SODIUM GLUCONATE, SODIUM ACETATE, POTASSIUM CHLORIDE AND MAGNESIUM CHLORIDE 50 ML/HR: 526; 502; 368; 37; 30 INJECTION, SOLUTION INTRAVENOUS at 12:24

## 2019-11-25 RX ADMIN — DOCUSATE SODIUM 100 MG: 100 CAPSULE, LIQUID FILLED ORAL at 09:38

## 2019-11-25 RX ADMIN — SODIUM CHLORIDE, SODIUM GLUCONATE, SODIUM ACETATE, POTASSIUM CHLORIDE AND MAGNESIUM CHLORIDE 50 ML/HR: 526; 502; 368; 37; 30 INJECTION, SOLUTION INTRAVENOUS at 18:33

## 2019-11-25 RX ADMIN — FUROSEMIDE 20 MG: 20 TABLET ORAL at 02:40

## 2019-11-25 RX ADMIN — ONDANSETRON 4 MG: 2 INJECTION INTRAMUSCULAR; INTRAVENOUS at 16:04

## 2019-11-25 RX ADMIN — MIDAZOLAM 2 MG: 1 INJECTION INTRAMUSCULAR; INTRAVENOUS at 10:12

## 2019-11-25 RX ADMIN — SODIUM CHLORIDE, SODIUM GLUCONATE, SODIUM ACETATE, POTASSIUM CHLORIDE AND MAGNESIUM CHLORIDE: 526; 502; 368; 37; 30 INJECTION, SOLUTION INTRAVENOUS at 15:30

## 2019-11-25 RX ADMIN — FENTANYL CITRATE 100 MCG: 50 INJECTION, SOLUTION INTRAMUSCULAR; INTRAVENOUS at 10:11

## 2019-11-25 RX ADMIN — BUPIVACAINE HYDROCHLORIDE 30 ML: 2.5 INJECTION, SOLUTION INFILTRATION; PERINEURAL at 10:32

## 2019-11-25 RX ADMIN — CHLORHEXIDINE GLUCONATE 0.12% ORAL RINSE 15 ML: 1.2 LIQUID ORAL at 09:38

## 2019-11-25 RX ADMIN — ROPIVACAINE HYDROCHLORIDE: 5 INJECTION, SOLUTION EPIDURAL; INFILTRATION; PERINEURAL at 15:40

## 2019-11-25 RX ADMIN — HEPARIN SODIUM 5000 UNITS: 5000 INJECTION INTRAVENOUS; SUBCUTANEOUS at 22:00

## 2019-11-25 RX ADMIN — METHOCARBAMOL TABLETS 500 MG: 500 TABLET, COATED ORAL at 00:07

## 2019-11-25 RX ADMIN — HYDROMORPHONE HYDROCHLORIDE 1 MG: 1 INJECTION, SOLUTION INTRAMUSCULAR; INTRAVENOUS; SUBCUTANEOUS at 08:54

## 2019-11-25 RX ADMIN — ACETAMINOPHEN 975 MG: 325 TABLET ORAL at 06:11

## 2019-11-25 RX ADMIN — GABAPENTIN 300 MG: 300 CAPSULE ORAL at 22:01

## 2019-11-25 RX ADMIN — METHOCARBAMOL TABLETS 500 MG: 500 TABLET, COATED ORAL at 06:11

## 2019-11-25 RX ADMIN — ONDANSETRON 4 MG: 2 INJECTION INTRAMUSCULAR; INTRAVENOUS at 08:54

## 2019-11-25 RX ADMIN — OXYCODONE HYDROCHLORIDE 10 MG: 10 TABLET ORAL at 12:45

## 2019-11-25 NOTE — ANESTHESIA PROCEDURE NOTES
Peripheral Block    Patient location during procedure: ICU  Start time: 11/25/2019 10:32 AM  Reason for block: procedure for pain and at surgeon's request  Staffing  Anesthesiologist: Sharyle Rote, MD  Performed: anesthesiologist   Preanesthetic Checklist  Completed: patient identified, site marked, surgical consent, pre-op evaluation, timeout performed, IV checked, risks and benefits discussed and monitors and equipment checked  Peripheral Block  Patient position: sitting  Prep: ChloraPrep  Patient monitoring: continuous pulse ox, heart rate, cardiac monitor and frequent blood pressure checks  Anesthesia block type: erector spinae  Laterality: left  Injection technique: single-shot  Procedures: ultrasound guided, Ultrasound guidance required for the procedure to increase accuracy and safety of medication placement and decrease risk of complications    Ultrasound permanent image savedbupivacaine (MARCAINE) 0 25 % perineural infiltration, 30 mL  Needle  Needle type: Stimuplex   Needle gauge: 22 G  Needle length: 10 cm  Needle localization: ultrasound guidance  Needle insertion depth: 4 cm  Assessment  Injection assessment: incremental injection, local visualized surrounding nerve on ultrasound, negative aspiration for heme and no paresthesia on injection  Paresthesia pain: none  Heart rate change: no  Slow fractionated injection: yes  Post-procedure:  site cleaned  patient tolerated the procedure well with no immediate complications

## 2019-11-25 NOTE — PHYSICAL THERAPY NOTE
Physical Therapy Evaluation     11/25/19 1341   Note Type   Note type Eval only   Pain Assessment   Pain Assessment 0-10   Pain Score 3   Pain Type Acute pain   Pain Location Rib cage; Shoulder   Pain Orientation Left;Right  (L ribcage, R shoulder)   Patient's Stated Pain Goal No pain   Hospital Pain Intervention(s) Repositioned; Ambulation/increased activity   Home Living   Type of [address] One level;Performs ADLs on one level; Able to live on main level with bedroom/bathroom  (2-3 FILIPE)   Home Equipment   (no DME)   Additional Comments Pt's daughter confirms pt lives in [facility] with 2-3 FILIPE and no DME   Prior Function   Level of [place] Independent with ADLs and functional mobility   Lives With Spouse   Receives Help From Family   ADL Assistance Independent   IADLs Independent   Falls in the last 6 months 1 to 4   Vocational Full time employment   Comments Pt's daughter confirms pt lives with  and was independent PTA  Restrictions/Precautions   Weight Bearing Precautions Per Order Yes   RUE Weight Bearing Per Order NWB   Braces or Orthoses Sling  (RUE)   Other Precautions WBS; Bed Alarm;O2;Telemetry;Multiple lines; Fall Risk;Pain   General   Family/Caregiver Present Yes  (Daughter)   Cognition   Overall Cognitive Status WFL   Arousal/Participation Cooperative   Attention Attends with cues to redirect   Orientation Level Oriented X4   Following Commands Follows one step commands with increased time or repetition   Comments  Pt is initially hesitant to participate in therapy session due to reported pain and nausea  Pt agreeable to therapy session after explanation of goals of session and benefits of mobility  Pt requires increased time for all functional mobility due to pain     RLE Assessment   RLE Assessment   (Functionally at least 3/5)   LLE Assessment   LLE Assessment   (Functionally at least 3/5)   Coordination   Movements are Fluid and Coordinated 0   Coordination and Movement Description Pt requires increased time for all mobility due to pain   Sensation   (Pt reports no LE sensory deficits)   Bed Mobility   Rolling L 4  Minimal assistance   Additional items Assist x 1; Increased time required;Verbal cues   Supine to Sit 4  Minimal assistance   Additional items Assist x 1; Increased time required;Verbal cues;LE management   Transfers   Sit to Stand 4  Minimal assistance   Additional items Assist x 1; Increased time required;Verbal cues   Stand to Sit 4  Minimal assistance   Additional items Assist x 1; Increased time required;Verbal cues   Ambulation/Elevation   Gait pattern Antalgic;Narrow NADYA; Forward Flexion; Excessively slow; Short stride; Shuffling   Gait Assistance 4  Minimal assist   Additional items Assist x 1;Verbal cues   Assistive Device   (hand held assist)   Distance 7 steps to bedside chair   Balance   Static Sitting Fair   Dynamic Sitting Poor +   Static Standing Poor +   Dynamic Standing Poor +   Ambulatory Poor +   Endurance Deficit   Endurance Deficit Yes   Endurance Deficit Description Pt reporting pain and fatigue at end of session  Pt SpO2 ranged from 95-97% on HFNC FiO2 70 throughout therapy session  Activity Tolerance   Activity Tolerance Patient limited by fatigue;Patient limited by pain   Medical Staff Made Aware OT   Nurse Made Aware Yes Candy   Assessment   Prognosis Good   Problem List Decreased strength;Decreased endurance; Impaired balance;Decreased mobility; Decreased coordination;Decreased safety awareness;Pain   Assessment Pt is a 81year old female presenting to [facility] on 11/23/19 after a fall down stairs  XR trauma revealed L sided rib fxs, R glenohumeral dislocation  Pt now s/p R glenohumeral dislocation reduction, per ortho pt NWB RUE in sling  Pt with no significant PMH listed in chart  Pt presents today for initial physical therapy evaluation supine  Pt is initially hesitant to participate in therapy session due to reported pain and nausea   Pt agreeable to therapy session after explanation of goals of session and benefits of mobility  Pt requires increased time for all functional mobility due to pain  Pt SpO2 ranged from 95-97% on HFNC FiO2 70 throughout therapy session  Pt transferred supine to sit with minAx1  Pt sat EOB ~5 min with minAx1  Pt transferred sit to stand with minAx1 and hand held assist  Pt ambulated 7 steps to bedside chair with minAx1 and hand held assist  Pt demonstrates slow ester, narrow NADYA, short stride, shuffling steps, antalgic gait, and forward flexion during ambulation to bedside chair  Pt remained seated in bedside chair at end of session with all needs within reach and HAYDEE Shi present  Pt is anticipated safe to DC home with increased family support and home PT services pending further ambulation, stair trial  Pt may require inpt rehab pending progress  PT to follow   Goals   Patient Goals to have less pain   STG Expiration Date 12/09/19   Short Term Goal #1 In 10 sessions pt will: 1  Transfer supine to sit with modified independence and use of bed rails if needed 2  Transfer sit to stand with LRAD and modified independence 3  Transfer to bedside chair with modified independence and LRAD 4  Perform LE exercise program 5  Ambulate >200ft with LRAD and modified independence 6  Ascend/descend 3 steps with supervision and use of handrails if needed 7  Increase activity tolerance to >20 min   PT Treatment Day 0   Plan   Treatment/Interventions Functional transfer training;LE strengthening/ROM; Therapeutic exercise; Endurance training;Equipment eval/education; Bed mobility;Gait training;Spoke to nursing;OT;Family   PT Frequency   (3-6x/wk)   Recommendation   Recommendation Home PT; Home with family support  (pending further ambulation, stair trial)   Equipment Recommended   (Continue to assess as appropriate)   PT - OK to Discharge No  (pending further ambulation, stair trial)   Modified Sabi Scale   Modified Sabi Scale 4   Barthel Index   Feeding 5 Bathing 0   Grooming Score 0   Dressing Score 5   Bladder Score 10   Bowels Score 10   Toilet Use Score 5   Transfers (Bed/Chair) Score 10   Mobility (Level Surface) Score 0   Stairs Score 0   Barthel Index Score [facility]

## 2019-11-25 NOTE — PLAN OF CARE
Problem: Prexisting or High Potential for Compromised Skin Integrity  Goal: Skin integrity is maintained or improved  Description  INTERVENTIONS:  - Identify patients at risk for skin breakdown  - Assess and monitor skin integrity  - Assess and monitor nutrition and hydration status  - Monitor labs   - Assess for incontinence   - Turn and reposition patient  - Assist with mobility/ambulation  - Relieve pressure over bony prominences  - Avoid friction and shearing  - Provide appropriate hygiene as needed including keeping skin clean and dry  - Evaluate need for skin moisturizer/barrier cream  - Collaborate with interdisciplinary team   - Patient/family teaching  - Consider wound care consult   Outcome: Progressing     Problem: Potential for Falls  Goal: Patient will remain free of falls  Description  INTERVENTIONS:  - Assess patient frequently for physical needs  -  Identify cognitive and physical deficits and behaviors that affect risk of falls  -  [place] fall precautions as indicated by assessment   - Educate patient/family on patient safety including physical limitations  - Instruct patient to call for assistance with activity based on assessment  - Modify environment to reduce risk of injury  - Consider OT/PT consult to assist with strengthening/mobility  Outcome: Progressing     Problem: NEUROSENSORY - ADULT  Goal: Achieves maximal functionality and self care  Description  INTERVENTIONS  - Monitor swallowing and airway patency with patient fatigue and changes in neurological status  - Encourage and assist patient to increase activity and self care     - Encourage visually impaired, hearing impaired and aphasic patients to use assistive/communication devices  Outcome: Progressing     Problem: CARDIOVASCULAR - ADULT  Goal: Maintains optimal cardiac output and hemodynamic stability  Description  INTERVENTIONS:  - Monitor I/O, vital signs and rhythm  - Monitor for S/S and trends of decreased cardiac output  - Administer and titrate ordered vasoactive medications to optimize hemodynamic stability  - Assess quality of pulses, skin color and temperature  - Assess for signs of decreased coronary artery perfusion  - Instruct patient to report change in severity of symptoms  Outcome: Progressing     Problem: RESPIRATORY - ADULT  Goal: Achieves optimal ventilation and oxygenation  Description  INTERVENTIONS:  - Assess for changes in respiratory status  - Assess for changes in mentation and behavior  - Position to facilitate oxygenation and minimize respiratory effort  - Oxygen administered by appropriate delivery if ordered  - Initiate smoking cessation education as indicated  - Encourage broncho-pulmonary hygiene including cough, deep breathe, Incentive Spirometry  - Assess the need for suctioning and aspirate as needed  - Assess and instruct to report SOB or any respiratory difficulty  - Respiratory Therapy support as indicated  Outcome: Progressing     Problem: GENITOURINARY - ADULT  Goal: Maintains or returns to baseline urinary function  Description  INTERVENTIONS:  - Assess urinary function  - Encourage oral fluids to ensure adequate hydration if ordered  - Administer IV fluids as ordered to ensure adequate hydration  - Administer ordered medications as needed  - Offer frequent toileting  - Follow urinary retention protocol if ordered  Outcome: Progressing     Problem: METABOLIC, FLUID AND ELECTROLYTES - ADULT  Goal: Electrolytes maintained within normal limits  Description  INTERVENTIONS:  - Monitor labs and assess patient for signs and symptoms of electrolyte imbalances  - Administer electrolyte replacement as ordered  - Monitor response to electrolyte replacements, including repeat lab results as appropriate  - Instruct patient on fluid and nutrition as appropriate  Outcome: Progressing  Goal: Fluid balance maintained  Description  INTERVENTIONS:  - Monitor labs   - Monitor I/O and WT  - Instruct patient on fluid and nutrition as appropriate  - Assess for signs & symptoms of volume excess or deficit  Outcome: Progressing     Problem: SKIN/TISSUE INTEGRITY - ADULT  Goal: Skin integrity remains intact  Description  INTERVENTIONS  - Identify patients at risk for skin breakdown  - Assess and monitor skin integrity  - Assess and monitor nutrition and hydration status  - Monitor labs (i e  albumin)  - Assess for incontinence   - Turn and reposition patient  - Assist with mobility/ambulation  - Relieve pressure over bony prominences  - Avoid friction and shearing  - Provide appropriate hygiene as needed including keeping skin clean and dry  - Evaluate need for skin moisturizer/barrier cream  - Collaborate with interdisciplinary team (i e  Nutrition, Rehabilitation, etc )   - Patient/family teaching  Outcome: Progressing     Problem: HEMATOLOGIC - ADULT  Goal: Maintains hematologic stability  Description  INTERVENTIONS  - Assess for signs and symptoms of bleeding or hemorrhage  - Monitor labs  - Administer supportive blood products/factors as ordered and appropriate  Outcome: Progressing     Problem: MUSCULOSKELETAL - ADULT  Goal: Maintain or return mobility to safest level of function  Description  INTERVENTIONS:  - Assess patient's ability to carry out ADLs; assess patient's baseline for ADL function and identify physical deficits which impact ability to perform ADLs (bathing, care of mouth/teeth, toileting, grooming, dressing, etc )  - Assess/evaluate cause of self-care deficits   - Assess range of motion  - Assess patient's mobility  - Assess patient's need for assistive devices and provide as appropriate  - Encourage maximum independence but intervene and supervise when necessary  - Involve family in performance of ADLs  - Assess for home care needs following discharge   - Consider OT consult to assist with ADL evaluation and planning for discharge  - Provide patient education as appropriate  Outcome: Progressing     Problem: Nutrition/Hydration-ADULT  Goal: Nutrient/Hydration intake appropriate for improving, restoring or maintaining nutritional needs  Description  Monitor and assess patient's nutrition/hydration status for malnutrition  Collaborate with interdisciplinary team and initiate plan and interventions as ordered  Monitor patient's weight and dietary intake as ordered or per policy  Utilize nutrition screening tool and intervene as necessary  Determine patient's food preferences and provide high-protein, high-caloric foods as appropriate       INTERVENTIONS:  - Monitor oral intake, urinary output, labs, and treatment plans  - Assess nutrition and hydration status and recommend course of action  - Evaluate amount of meals eaten  - Assist patient with eating if necessary   - Allow adequate time for meals  - Recommend/ encourage appropriate diets, oral nutritional supplements, and vitamin/mineral supplements  - Order, calculate, and assess calorie counts as needed  - Recommend, monitor, and adjust tube feedings and TPN/PPN based on assessed needs  - Assess need for intravenous fluids  - Provide specific nutrition/hydration education as appropriate  - Include patient/family/caregiver in decisions related to nutrition  Outcome: Progressing     Problem: PAIN - ADULT  Goal: Verbalizes/displays adequate comfort level or baseline comfort level  Description  Interventions:  - Encourage patient to monitor pain and request assistance  - Assess pain using appropriate pain scale  - Administer analgesics based on type and severity of pain and evaluate response  - Implement non-pharmacological measures as appropriate and evaluate response  - Consider cultural and social influences on pain and pain management  - Notify physician/advanced practitioner if interventions unsuccessful or patient reports new pain  Outcome: Progressing     Problem: INFECTION - ADULT  Goal: Absence or prevention of progression during hospitalization  Description  INTERVENTIONS:  - Assess and monitor for signs and symptoms of infection  - Monitor lab/diagnostic results  - Monitor all insertion sites, i e  indwelling lines, tubes, and drains  - Monitor endotracheal if appropriate and nasal secretions for changes in amount and color  - [place] appropriate cooling/warming therapies per order  - Administer medications as ordered  - Instruct and encourage patient and family to use good hand hygiene technique  - Identify and instruct in appropriate isolation precautions for identified infection/condition  Outcome: Progressing  Goal: Absence of fever/infection during neutropenic period  Description  INTERVENTIONS:  - Monitor WBC    Outcome: Progressing     Problem: SAFETY ADULT  Goal: Maintain or return to baseline ADL function  Description  INTERVENTIONS:  -  Assess patient's ability to carry out ADLs; assess patient's baseline for ADL function and identify physical deficits which impact ability to perform ADLs (bathing, care of mouth/teeth, toileting, grooming, dressing, etc )  - Assess/evaluate cause of self-care deficits   - Assess range of motion  - Assess patient's mobility; develop plan if impaired  - Assess patient's need for assistive devices and provide as appropriate  - Encourage maximum independence but intervene and supervise when necessary  - Involve family in performance of ADLs  - Assess for home care needs following discharge   - Consider OT consult to assist with ADL evaluation and planning for discharge  - Provide patient education as appropriate  Outcome: Progressing  Goal: Maintain or return mobility status to optimal level  Description  INTERVENTIONS:  - Assess patient's baseline mobility status (ambulation, transfers, stairs, etc )    - Identify cognitive and physical deficits and behaviors that affect mobility  - Identify mobility aids required to assist with transfers and/or ambulation (gait belt, sit-to-stand, lift, walker, cane, etc )  - [place] fall precautions as indicated by assessment  - Record patient progress and toleration of activity level on Mobility SBAR; progress patient to next Phase/Stage  - Instruct patient to call for assistance with activity based on assessment  - Consider rehabilitation consult to assist with strengthening/weightbearing, etc   Outcome: Progressing     Problem: DISCHARGE PLANNING  Goal: Discharge to home or other facility with appropriate resources  Description  INTERVENTIONS:  - Identify barriers to discharge w/patient and caregiver  - Arrange for needed discharge resources and transportation as appropriate  - Identify discharge learning needs (meds, wound care, etc )  - Arrange for interpretive services to assist at discharge as needed  - Refer to Case Management Department for coordinating discharge planning if the patient needs post-hospital services based on physician/advanced practitioner order or complex needs related to functional status, cognitive ability, or social support system  Outcome: Progressing     Problem: Knowledge Deficit  Goal: Patient/family/caregiver demonstrates understanding of disease process, treatment plan, medications, and discharge instructions  Description  Complete learning assessment and assess knowledge base    Interventions:  - Provide teaching at level of understanding  - Provide teaching via preferred learning methods  Outcome: Progressing     Problem: COPING  Goal: Pt/Family able to verbalize concerns and demonstrate effective coping strategies  Description  INTERVENTIONS:  - Assist patient/family to identify coping skills, available support systems and cultural and spiritual values  - Provide emotional support, including active listening and acknowledgement of concerns of patient and caregivers  - Reduce environmental stimuli, as able  - Provide patient education  - Assess for spiritual pain/suffering and initiate spiritual care, including notification of Pastoral Care or matt based community as needed  - Assess effectiveness of coping strategies  Outcome: Progressing  Goal: Will report anxiety at manageable levels  Description  INTERVENTIONS:  - Administer medication as ordered  - Teach and encourage coping skills  - Provide emotional support  - Assess patient/family for anxiety and ability to cope  Outcome: Progressing

## 2019-11-25 NOTE — PROGRESS NOTES
Pt p[ain improved with epidural in place and meds infusing  No distress noted vigorous vigorous pulmonary toileting in progress

## 2019-11-25 NOTE — PROGRESS NOTES
Dr Rikki Sadler at bedside and peripheral block completed by md  Consent obtained and family at bedside and procedure explained to pt  And family  Pt pre med with fenatnyl and versed as ordered  Pt  Tolerated the procedure

## 2019-11-25 NOTE — RESPIRATORY THERAPY NOTE
resp care      11/25/19 0820   Non-Invasive Information   Interface HFNC prongs   Non-Invasive Ventilation Mode HFNC   $ CPAP/BiPAP Charge - Initial & Daily Mgmt Yes   SpO2 93 %   $ Pulse Oximetry Spot Check Charge Completed   Resp Comments Will cont to attempt to decrease hfnc  Encourage IS, ezpap performed      Non-Invasive Settings   FiO2 (%) 60   Flow (lpm) 55   Temperature (Set) 31   Non-Invasive Readings   Heater Temperature (Obs) 31

## 2019-11-25 NOTE — PLAN OF CARE
Problem: PHYSICAL THERAPY ADULT  Goal: Performs mobility at highest level of function for planned discharge setting  See evaluation for individualized goals  Description  Treatment/Interventions: Functional transfer training, LE strengthening/ROM, Therapeutic exercise, Endurance training, Equipment eval/education, Bed mobility, Gait training, Spoke to nursing, OT, Family  Equipment Recommended: (Continue to assess as appropriate)       See flowsheet documentation for full assessment, interventions and recommendations  Note:   Prognosis: Good  Problem List: Decreased strength, Decreased endurance, Impaired balance, Decreased mobility, Decreased coordination, Decreased safety awareness, Pain  Assessment: Pt is a 81year old female presenting to B on 11/23/19 after a fall down stairs  XR trauma revealed L sided rib fxs, R glenohumeral dislocation  Pt now s/p R glenohumeral dislocation reduction, per ortho pt NWB RUE in sling  Pt with no significant PMH listed in chart  Pt presents today for initial physical therapy evaluation supine  Pt is initially hesitant to participate in therapy session due to reported pain and nausea  Pt agreeable to therapy session after explanation of goals of session and benefits of mobility  Pt requires increased time for all functional mobility due to pain  Pt SpO2 ranged from 95-97% on HFNC FiO2 70 throughout therapy session  Pt transferred supine to sit with minAx1  Pt sat EOB ~5 min with minAx1  Pt transferred sit to stand with minAx1 and hand held assist  Pt ambulated 7 steps to bedside chair with minAx1 and hand held assist  Pt demonstrates slow ester, narrow NADYA, short stride, shuffling steps, antalgic gait, and forward flexion during ambulation to bedside chair  Pt remained seated in bedside chair at end of session with all needs within reach and HAYDEE Arevalo Just present   Pt is anticipated safe to DC home with increased family support and home PT services pending further ambulation, stair trial  Pt may require inpt rehab pending progress  PT to follow        Recommendation: (S) Home PT, Home with family support(pending further ambulation, stair trial)     PT - OK to Discharge: (S) No(pending further ambulation, stair trial)    See flowsheet documentation for full assessment        Maeve Talley, SPT

## 2019-11-25 NOTE — PROGRESS NOTES
Daily Progress Note - Rodri [address] 81 y o  female MRN: [id number]  Unit/Bed#: ICU 14 Encounter: [id number]        ----------------------------------------------------------------------------------------  HPI/24hr events: No acute overnight events  Downtrending O2 requirements  ---------------------------------------------------------------------------------------  SUBJECTIVE  Pain much better controlled  Now feels minimal pain with deep breathing  Has a good appetite, is eating and urinating  Has not had a bowel movement yet however  Review of Systems  Review of systems was reviewed and negative unless stated above in HPI/24-hour events   ---------------------------------------------------------------------------------------  Assessment and Plan:    Neuro:    Diagnosis: Acute Pain 2/2 Trauma  o Plan:  Patient on rib fracture protocol, getting good relief from pain  Currently on scheduled Tylenol 975 q 8, Neurontin 300 mg q h s , Lidoderm patch, and Robaxin 500 mg q 6 hours  Also getting oxycodone 5 mg for moderate pain: 1 dose in last 24 hours; oxycodone 10 mg q 4 hours severe pain:  4 doses in last 24 hours; and Dilaudid 1 mg q 3 hours for breakthrough pain:  3 doses last 24 hours  Follow-up acute Pain Service recommendations    CV:   No issues      Pulm:   Diagnosis: Acute hypoxic respiratory failure  o Plan:  Secondary to rib fractures  Patient also given Lasix overnight to help with volume status  Currently I asked only 500 cc, encouraged patient and her  to perform this every hour  Currently on high-flow nasal cannula 70% 55 L   Diagnosis: Multiple Right Sided Rib Fx  o Plan:  Pain control as per above      GI:   No issues      :   No issues  Voiding spontaneously      F/E/N:   No issues  No BMP today, will check tomorrow  Continue regular diet      Heme/Onc:   No issues   No CBC today, will check tomorrow    Endo:   No issues      ID:    Diagnosis: Leukocytosis  o Plan:  Slight leukocytosis on arrival without bandemia  No fevers  No laboratories today, will repeat tomorrow      MSK/Skin:    Diagnosis: Mechanical Fall  o Plan:  PTOT consult   Diagnosis: Right Anterior Shoulder Dislocation with Nghia Neat and Bankhart Lesion  o Plan:  Reduced by Orthopedics  Orthopedics following  Non weightbear right upper extremity      Disposition: Continue Stepdown Level 1 level of care   Code Status: Level 1 - Full Code  ---------------------------------------------------------------------------------------  ICU CORE MEASURES    Prophylaxis   VTE Pharmacologic Prophylaxis: Enoxaparin (Lovenox)  VTE Mechanical Prophylaxis: sequential compression device  Stress Ulcer Prophylaxis: Prophylaxis Not Indicated     ABCDE Protocol (if indicated)  Plan to perform spontaneous awakening trial today? Not applicable  Plan to perform spontaneous breathing trial today? Not applicable  Obvious barriers to extubation? Not applicable  CAM-ICU: Negative    Invasive Devices Review  Invasive Devices     Peripheral Intravenous Line            Peripheral IV 11/23/19 Left Antecubital 1 day    Peripheral IV 11/24/19 Left Forearm less than 1 day              Can any invasive devices be discontinued today? Not applicable  ---------------------------------------------------------------------------------------  OBJECTIVE    Physical Exam   Constitutional: She is oriented to person, place, and time  She appears well-developed and well-nourished  Appears approximately stated age, lying in bed comfortably with high-flow nasal cannula in place, no apparent distress, nontoxic appearing  HENT:   Head: Normocephalic and atraumatic  Eyes: Pupils are equal, round, and reactive to light  Conjunctivae and EOM are normal    Neck: Normal range of motion  Neck supple  No JVD present  Cardiovascular: Normal rate, regular rhythm and normal heart sounds  No murmur heard    Pulmonary/Chest: She has no wheezes  Chest wall tender to palpation  Shallow, tachypneic breast   Decreased breath sounds in the bases  Incentive spirometer performed, 500 cc   Abdominal: Soft  Bowel sounds are normal  She exhibits no distension  There is no tenderness  Genitourinary:   Genitourinary Comments: Deferred   Musculoskeletal:   Right arm in sling, neurovascularly intact   Neurological: She is alert and oriented to person, place, and time  Skin: Skin is warm and dry  Capillary refill takes less than 2 seconds  Nursing note and vitals reviewed  Vitals   Vitals:    19 0300 19 0400 19 0500 19 0600   BP: 105/62 103/63  102/55   Pulse: 88 86  92   Resp: 14 (!) 11  (!) 37   Temp:  100 °F (37 8 °C)     TempSrc:  Oral     SpO2: 97% 97% 94% 96%   Weight:       Height:         Temp (24hrs), Av 2 °F (37 3 °C), Min:98 4 °F (36 9 °C), Max:100 °F (37 8 °C)  Current: Temperature: 100 °F (37 8 °C)  HR: 86  BP: 106/62  RR: 18  SpO2: 94% on HFNC 70% 55L    Invasive/non-invasive ventilation settings   Respiratory    Lab Data (Last 4 hours)    None         O2/Vent Data (Last 4 hours)       0500          Non-Invasive Ventilation Mode HFNC                   Height and Weights   Height: 5' 2" (157 5 cm)  IBW: 50 1 kg  Body mass index is 31 94 kg/m²  Weight (last 2 days)     Date/Time   Weight    19 0600   79 2 (174 6)    19 1948   79 4 (175 05)    19 1630   80 8 (178 13)                Intake and Output  I/O        0701 -  0700  0701 -  0700    P  O   1650    I V  (mL/kg) 5 (0 1)     Total Intake(mL/kg) 5 (0 1) 1650 (20 8)    Urine (mL/kg/hr) 300 625 (0 3)    Emesis/NG output 575     Total Output 875 625    Net -870 +1025                Nutrition       Diet Orders   (From admission, onward)             Start     Ordered    19  Diet Regular; Regular House  Diet effective now     Question Answer Comment   Diet Type Regular    Regular Regular House    RD to adjust diet per protocol?  Yes        11/23/19 1851                Laboratory and Diagnostics:  Results from last 7 days   Lab Units 11/24/19  1557 11/23/19  1641 11/23/19  1632   WBC Thousand/uL 11 04* 9 81  --    HEMOGLOBIN g/dL 11 5 12 4  --    I STAT HEMOGLOBIN g/dl  --   --  12 9   HEMATOCRIT % 37 2 39 4  --    HEMATOCRIT, ISTAT %  --   --  38   PLATELETS Thousands/uL 172 265  --    NEUTROS PCT % 77* 69  --    MONOS PCT % 11 6  --      Results from last 7 days   Lab Units 11/24/19  0536 11/23/19  1632   SODIUM mmol/L 141  --    POTASSIUM mmol/L 4 3  --    CHLORIDE mmol/L 110*  --    CO2 mmol/L 24  --    CO2, I-STAT mmol/L  --  25   ANION GAP mmol/L 7  --    BUN mg/dL 23  --    CREATININE mg/dL 0 98  --    CALCIUM mg/dL 8 3  --    GLUCOSE RANDOM mg/dL 129  --      Results from last 7 days   Lab Units 11/24/19  0536   MAGNESIUM mg/dL 2 1   PHOSPHORUS mg/dL 4 3*      Results from last 7 days   Lab Units 11/23/19  1641   INR  0 98   PTT seconds 22*              ABG:    VBG:          Micro        EKG: None today  Imaging:  I have personally reviewed pertinent films in PACS    Active Medications  Scheduled Meds:  Current Facility-Administered Medications:  acetaminophen 975 mg Oral Q8H [address] Vanessa Hamm MD   chlorhexidine 15 mL Swish & Spit Q12H [address] Vanessa Hamm MD   enoxaparin 30 mg Subcutaneous Q12H [address] Vanessa Hamm MD   gabapentin 300 mg Oral HS Vanessa Hamm MD   HYDROmorphone 1 mg Intravenous Q3H PRN Vanessa Hamm MD   lidocaine 1 patch Topical Daily Vanessa Hamm MD   methocarbamol 500 mg Oral Q6H [address] Vanessa Hamm MD   ondansetron 4 mg Intravenous Q6H PRN Vanessa Hamm MD   oxyCODONE 10 mg Oral Q4H PRN Vanessa Hamm MD   oxyCODONE 5 mg Oral Q4H PRN Vanessa Hamm MD   senna 1 tablet Oral HS Rocky Chaudhari MD     Continuous Infusions:     PRN Meds:     HYDROmorphone 1 mg Q3H PRN   ondansetron 4 mg Q6H PRN   oxyCODONE 10 mg Q4H PRN   oxyCODONE 5 mg Q4H PRN       Allergies   No Known Allergies    Advance Directive and Living Will:      Power of :    POLST:        Counseling / Coordination of Care  Total time spent today 36 minutes  Greater than 50% of total time was spent with the patient and / or family counseling and / or coordination of care  Luan Parada PA-C        Portions of the record may have been created with voice recognition software  Occasional wrong word or "sound a like" substitutions may have occurred due to the inherent limitations of voice recognition software    Read the chart carefully and recognize, using context, where substitutions have occurred

## 2019-11-25 NOTE — CONSULTS
Consultation - Acute Pain Service   [facility] 81 y o  female MRN: [id number]  Unit/Bed#: ICU 14 Encounter: [id number]               Assessment/Plan     Assessment:   82 yo female s/p fall down 8 concrete stairs with R anterior shoulder injury and multiple left sided rib fractures still on high flow nasal cannula with continued pain  Plan:   1  Will place left erector spinae block with exparel  2  Continue multimodal pain regimen as you are doing  APS will continue to follow  Please call [phone] during normal hours and [phone] after hours  History of Present Illness    Admit Date:  11/23/2019  Hospital Day:  2 days  Primary Service:  Critical Care/ICU  Attending Provider:  Evelin Granado MD  Reason for Consult / Principal Problem: rib fractures  Hx and PE limited by: none  HPI: [facility] is a 81y o  year old female who presents s/p fall down 8 concrete stairs,with R anterior shoulder dislocation with Hill-Sachs and Bankart fx, multiple L-sided rib fractures  She doesn't have a chronic opioid use history  Inpatient consult to Acute Pain Service  Consult performed by: Gaudencio Vaughn MD  Consult ordered by: Phylliss Halsted, MD          Review of Systems    Historical Information   History reviewed  No pertinent past medical history  History reviewed  No pertinent surgical history    Social History   Social History     Substance and Sexual Activity   Alcohol Use Not Currently     Social History     Substance and Sexual Activity   Drug Use Not Currently     Social History     Tobacco Use   Smoking Status Never Smoker     Family History: non-contributory    Meds/Allergies   all current active meds have been reviewed    No Known Allergies    Objective   Temp:  [98 4 °F (36 9 °C)-100 °F (37 8 °C)] 98 7 °F (37 1 °C)  HR:  [] 100  Resp:  [11-48] 28  BP: (101-161)/(55-77) 116/66  FiO2 (%):  [60] 60    Intake/Output Summary (Last 24 hours) at 11/25/2019 1324  Last data filed at 11/25/2019 1200  Gross per 24 hour   Intake 950 ml   Output 700 ml   Net 250 ml       Physical Exam   Cardiovascular: Regular rhythm  Pulmonary/Chest: She exhibits tenderness  Splinting breathing pattern   Neurological: She is alert  Skin: Skin is warm  Lab Results: I have personally reviewed pertinent labs  Imaging Studies: I have personally reviewed pertinent reports  EKG, Pathology, and Other Studies: I have personally reviewed pertinent reports  Counseling / Coordination of Care  Total floor / unit time spent today Level 1 = 20 minutes  Greater than 50% of total time was spent with the patient and / or family counseling and / or coordination of care  A description of the counseling / coordination of care: explaining risks and benefits of block

## 2019-11-25 NOTE — OCCUPATIONAL THERAPY NOTE
Occupational Therapy Evaluation      Keenan Alvarez    11/25/2019    Principal Problem:    Anterior shoulder dislocation, right, initial encounter  Active Problems:    Multiple fractures of ribs, left side, initial encounter for closed fracture    Fall down stairs    Acute respiratory failure with hypoxia (HCC)    Leukocytosis    Acute pain due to trauma      History reviewed  No pertinent past medical history  History reviewed  No pertinent surgical history  11/25/19 1350   Note Type   Note type Eval only   Restrictions/Precautions   Weight Bearing Precautions Per Order Yes   RUE Weight Bearing Per Order NWB   Braces or Orthoses Splint   Other Precautions WBS; Multiple lines;Telemetry;O2;Fall Risk;Pain   Pain Assessment   Pain Assessment 0-10   Pain Score 3   Pain Type Acute pain   Pain Location Rib cage   Pain Orientation Left   Home Living   Type of [address] [facility] One level  (2-3 FILIPE)   Bathroom Shower/Tub Tub/shower unit   Bathroom Toilet Standard   Additional Comments no use of DME at home   Prior Function   Level of [place] Independent with ADLs and functional mobility   Lives With [place] Help From Family   ADL Assistance Independent   IADLs Independent   Falls in the last 6 months 1 to 4   Vocational Full time employment   Comments works in her own [employer] shop   Lifestyle   Autonomy fully independent   Reciprocal Relationships supportive family   Psychosocial   Patient Behaviors/Mood Anxious; Cooperative   ADL   Grooming Assistance 3  Moderate Assistance   Grooming Deficit Increased time to complete   UB Bathing Assistance 2  Maximal Assistance   UB Bathing Deficit Increased time to complete   LB Bathing Assistance 2  Maximal Assistance   LB Bathing Deficit Increased time to complete   UB Dressing Assistance 2  Maximal Assistance   LB Dressing Assistance 2  Maximal Assistance   Bed Mobility   Rolling L 4  Minimal assistance   Additional items Assist x 1; Increased time required;Verbal cues   Sit to Supine 4  Minimal assistance   Additional items Increased time required   Transfers   Sit to Stand 4  Minimal assistance   Additional items Assist x 1; Increased time required;Verbal cues   Stand to Sit 4  Minimal assistance   Additional items Assist x 1; Increased time required;Verbal cues   Stand pivot 4  Minimal assistance   Additional items Assist x 1; Increased time required   Balance   Static Sitting Fair +   Dynamic Sitting Fair   Static Standing Poor +   Dynamic Standing Poor +   Activity Tolerance   Activity Tolerance Patient limited by pain; Patient limited by fatigue  (on high flow O2)   Nurse Made Aware appropriate to see per HAYDEE Bryan   RUE Assessment   RUE Assessment   (Not tested, NWB in sling)   LUE Assessment   LUE Assessment WFL   Hand Function   Gross Motor Coordination Functional   Fine Motor Coordination Functional   Cognition   Arousal/Participation Cooperative   Attention Attends with cues to redirect  (due to pain)   Orientation Level Oriented to person;Oriented to place;Oriented to time   Following Commands Follows one step commands without difficulty   Assessment   Limitation Decreased ADL status; Decreased UE ROM; Decreased UE strength;Decreased endurance;Decreased self-care trans;Decreased high-level ADLs   Assessment Pt is a 81 y o  female seen for OT evaluation s/p admit to [facility] on 11/23/2019 w/ Anterior shoulder dislocation, right, initial encounter  Pt admitted s/p fall down 8 Steps  Also with multiple L rib fractures  Currently on high flow nasal canula, and c /o  Severe pain  Comorbidities affecting pt's functional performance at time of assessment include: acute pain due to trauma, leukocytosis, acute respiratory failure  Personal factors affecting pt at time of IE include:steps to enter environment, difficulty performing ADLS and difficulty performing IADLS   Prior to admission, pt was living at home w her  in one story home   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  Daughter nearby and states she will be with her patients for the first few weeks  Upon evaluation: Pt requires mod/max assist w dressing/self care, min assist bedmobiltiy and supine to sit as well as sit to stand and SPT  She does have significant pain, SOB and is fearful of falling  Pt to benefit from continued skilled OT tx while in the hospital to address deficits as defined above and maximize level of functional independence w ADL's and functional mobility  Occupational Performance areas to address include: grooming, bathing/shower, toilet hygiene, dressing, functional mobility and clothing management  Pt scored  45 /100 on the barthel index  Based on findings from OT evaluation and functional performance deficits, pt has been identified as a  High complexity evaluation  From OT standpoint, recommendation at time of d/c would be home with family support vs short term rehab pending progress   Goals   STG Time Frame 3-5   Short Term Goal #1 complete bedmobility mod I w good safey    Short Term Goal #2 good balance sitting at EOB x 10 min    LTG Time Frame 10-14   Long Term Goal #1 pt will tolerate standing at sinkside x 10 min with fair + balance for completion of hygiene   Long Term Goal #2 demonstrate good ECT/self pacing  skill with self care and functional mobility   Functional Transfer Goals   Pt Will Perform All Functional Transfers With mod indep; With good judgment/safety; With assistive devices   ADL Goals   Pt Will Perform Grooming With stand by assist   Pt Will Perform Bathing With min assist   Pt Will Perform UE Dressing With min assist   Pt Will Perform LE Dressing With min assist   Pt Will Perform Toileting With min assist   Plan   Treatment Interventions ADL retraining;Functional transfer training;UE strengthening/ROM; Endurance training;Patient/family training;Equipment evaluation/education; Fine motor coordination activities; Compensatory technique education; Energy conservation; Activityengagement   Goal Expiration Date 12/09/19   OT Frequency 3-5x/wk   Recommendation   OT Discharge Recommendation Home with family support  (vs ST rehab pending progress)   Barthel Index   Feeding 5   Bathing 0   Grooming Score 0   Dressing Score 5   Bladder Score 10   Bowels Score 10   Toilet Use Score 5   Transfers (Bed/Chair) Score 10   Mobility (Level Surface) Score 0   Stairs Score 0   Barthel Index Score 45

## 2019-11-25 NOTE — PROGRESS NOTES
Epidural drip with ropivacaine AND FENTANYL INITIATED BY DR Gavin King   FLUID BOLUS COMPLETED/ BP IMPROVED

## 2019-11-25 NOTE — PLAN OF CARE
Problem: OCCUPATIONAL THERAPY ADULT  Goal: Performs self-care activities at highest level of function for planned discharge setting  See evaluation for individualized goals  Description  Treatment Interventions: ADL retraining, Functional transfer training, UE strengthening/ROM, Endurance training, Patient/family training, Equipment evaluation/education, Fine motor coordination activities, Compensatory technique education, Energy conservation, Activityengagement          See flowsheet documentation for full assessment, interventions and recommendations  Note:   Limitation: Decreased ADL status, Decreased UE ROM, Decreased UE strength, Decreased endurance, Decreased self-care trans, Decreased high-level ADLs     Assessment: Pt is a 81 y o  female seen for OT evaluation s/p admit to [facility] on 11/23/2019 w/ Anterior shoulder dislocation, right, initial encounter  Pt admitted s/p fall down 8 Steps  Also with multiple L rib fractures  Currently on high flow nasal canula, and c /o  Severe pain  Comorbidities affecting pt's functional performance at time of assessment include: acute pain due to trauma, leukocytosis, acute respiratory failure  Personal factors affecting pt at time of IE include:steps to enter environment, difficulty performing ADLS and difficulty performing IADLS   Prior to admission, pt was living at home w her  in one story home  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  Daughter nearby and states she will be with her patients for the first few weeks  Upon evaluation: Pt requires mod/max assist w dressing/self care, min assist bedmobiltiy and supine to sit as well as sit to stand and SPT  She does have significant pain, SOB and is fearful of falling  Pt to benefit from continued skilled OT tx while in the hospital to address deficits as defined above and maximize level of functional independence w ADL's and functional mobility   Occupational Performance areas to address include: grooming, bathing/shower, toilet hygiene, dressing, functional mobility and clothing management  Pt scored  45 /100 on the barthel index  Based on findings from OT evaluation and functional performance deficits, pt has been identified as a  High complexity evaluation   From OT standpoint, recommendation at time of d/c would be home with family support vs short term rehab pending progress     OT Discharge Recommendation: Home with family support(vs ST rehab pending progress)

## 2019-11-25 NOTE — PROGRESS NOTES
Patient was visited by  was anointed and blessing provided         11/25/19 1500   Clinical Encounter Type   Visited With Patient and family together   Latter-day Encounters   Latter-day Needs Prayer   Sacramental Encounters   Sacrament of Sick-Anointing Anointed

## 2019-11-25 NOTE — NURSING NOTE
In depth conversation had with pt and family regarding the importance of taking oral PRN pain medications and scheduled pain medications so she is able to participate in using IS and getting OOB  Pt and family understanding  Pt willing to try PO pain medications and use  IS as advised

## 2019-11-25 NOTE — SOCIAL WORK
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  Pt was IPTA with all ADL's and drive  Pt has no DME  [facility] needs  PCP is dr Everett Foote with [facility]  Pt denies hx with HHC, STR, alc, drug and IP psych tx  Pt has transportation when dc  CM reviewed d/c planning process including the following: identifying help at home, patient preference for d/c planning needs, Discharge Lounge, [facility] Meds to Bed program, availability of treatment team to discuss questions or concerns patient and/or family may have regarding understanding medications and recognizing signs and symptoms once discharged  CM also encouraged patient to follow up with all recommended appointments after discharge  Patient advised of importance for patient and family to participate in managing patients medical well being

## 2019-11-25 NOTE — ANESTHESIA PROCEDURE NOTES
Epidural Block    Patient location during procedure: ICU  Start time: 11/25/2019 2:29 PM  Reason for block: procedure for pain and at surgeon's request  Staffing  Anesthesiologist: Sharyle Rote, MD  Performed: anesthesiologist   Preanesthetic Checklist  Completed: patient identified, site marked, surgical consent, pre-op evaluation, timeout performed, IV checked, risks and benefits discussed and monitors and equipment checked  Epidural  Patient position: sitting  Prep: Betadine  Patient monitoring: continuous pulse ox  Approach: midline  Location: thoracic (1-12)  Injection technique: RAMSES saline  Needle  Needle type: Tuohy   Needle gauge: 18 G  Catheter type: end hole  Catheter size: 18 G  Catheter at skin depth: 14 cm  Test dose: negative  Assessment  Sensory level: [id number] aspiration for CSF, negative aspiration for heme and no paresthesia on injection  patient tolerated the procedure well with no immediate complications

## 2019-11-25 NOTE — PROGRESS NOTES
Pt  assisted oob to chair with pt/ot at bedside  Pt  Voided 75 cc dark tr urine ABGs obtained, as ordered pain down to a 3 at this tiime  Family at bedside with pt

## 2019-11-26 ENCOUNTER — APPOINTMENT (INPATIENT)
Dept: RADIOLOGY | Facility: HOSPITAL | Age: 69
DRG: 963 | End: 2019-11-26
Payer: COMMERCIAL

## 2019-11-26 ENCOUNTER — APPOINTMENT (INPATIENT)
Dept: RADIOLOGY | Facility: HOSPITAL | Age: 69
DRG: 963 | End: 2019-11-26
Attending: PHYSICIAN ASSISTANT
Payer: COMMERCIAL

## 2019-11-26 PROBLEM — M79.671 RIGHT FOOT PAIN: Status: ACTIVE | Noted: 2019-11-26

## 2019-11-26 LAB
ANION GAP SERPL CALCULATED.3IONS-SCNC: 6 MMOL/L (ref 4–13)
BASOPHILS # BLD AUTO: 0.03 THOUSANDS/ΜL (ref 0–0.1)
BASOPHILS NFR BLD AUTO: 0 % (ref 0–1)
BUN SERPL-MCNC: 9 MG/DL (ref 5–25)
CALCIUM SERPL-MCNC: 8 MG/DL (ref 8.3–10.1)
CHLORIDE SERPL-SCNC: 103 MMOL/L (ref 100–108)
CO2 SERPL-SCNC: 28 MMOL/L (ref 21–32)
CREAT SERPL-MCNC: 0.56 MG/DL (ref 0.6–1.3)
EOSINOPHIL # BLD AUTO: 0.23 THOUSAND/ΜL (ref 0–0.61)
EOSINOPHIL NFR BLD AUTO: 2 % (ref 0–6)
ERYTHROCYTE [DISTWIDTH] IN BLOOD BY AUTOMATED COUNT: 14.2 % (ref 11.6–15.1)
GFR SERPL CREATININE-BSD FRML MDRD: 97 ML/MIN/1.73SQ M
GLUCOSE SERPL-MCNC: 92 MG/DL (ref 65–140)
HCT VFR BLD AUTO: 31.7 % (ref 34.8–46.1)
HGB BLD-MCNC: 10 G/DL (ref 11.5–15.4)
IMM GRANULOCYTES # BLD AUTO: 0.05 THOUSAND/UL (ref 0–0.2)
IMM GRANULOCYTES NFR BLD AUTO: 1 % (ref 0–2)
LYMPHOCYTES # BLD AUTO: 0.62 THOUSANDS/ΜL (ref 0.6–4.47)
LYMPHOCYTES NFR BLD AUTO: 6 % (ref 14–44)
MAGNESIUM SERPL-MCNC: 2 MG/DL (ref 1.6–2.6)
MCH RBC QN AUTO: 29.3 PG (ref 26.8–34.3)
MCHC RBC AUTO-ENTMCNC: 31.5 G/DL (ref 31.4–37.4)
MCV RBC AUTO: 93 FL (ref 82–98)
MONOCYTES # BLD AUTO: 0.76 THOUSAND/ΜL (ref 0.17–1.22)
MONOCYTES NFR BLD AUTO: 7 % (ref 4–12)
NEUTROPHILS # BLD AUTO: 8.63 THOUSANDS/ΜL (ref 1.85–7.62)
NEUTS SEG NFR BLD AUTO: 84 % (ref 43–75)
NRBC BLD AUTO-RTO: 0 /100 WBCS
PHOSPHATE SERPL-MCNC: 2.1 MG/DL (ref 2.3–4.1)
PLATELET # BLD AUTO: 155 THOUSANDS/UL (ref 149–390)
PMV BLD AUTO: 10.5 FL (ref 8.9–12.7)
POTASSIUM SERPL-SCNC: 3.7 MMOL/L (ref 3.5–5.3)
RBC # BLD AUTO: 3.41 MILLION/UL (ref 3.81–5.12)
SODIUM SERPL-SCNC: 137 MMOL/L (ref 136–145)
WBC # BLD AUTO: 10.32 THOUSAND/UL (ref 4.31–10.16)

## 2019-11-26 PROCEDURE — 83735 ASSAY OF MAGNESIUM: CPT | Performed by: PHYSICIAN ASSISTANT

## 2019-11-26 PROCEDURE — 73630 X-RAY EXAM OF FOOT: CPT

## 2019-11-26 PROCEDURE — 94660 CPAP INITIATION&MGMT: CPT

## 2019-11-26 PROCEDURE — 84100 ASSAY OF PHOSPHORUS: CPT | Performed by: PHYSICIAN ASSISTANT

## 2019-11-26 PROCEDURE — 94760 N-INVAS EAR/PLS OXIMETRY 1: CPT

## 2019-11-26 PROCEDURE — NC001 PR NO CHARGE: Performed by: EMERGENCY MEDICINE

## 2019-11-26 PROCEDURE — 85025 COMPLETE CBC W/AUTO DIFF WBC: CPT | Performed by: PHYSICIAN ASSISTANT

## 2019-11-26 PROCEDURE — 94668 MNPJ CHEST WALL SBSQ: CPT

## 2019-11-26 PROCEDURE — 71045 X-RAY EXAM CHEST 1 VIEW: CPT

## 2019-11-26 PROCEDURE — 80048 BASIC METABOLIC PNL TOTAL CA: CPT | Performed by: PHYSICIAN ASSISTANT

## 2019-11-26 PROCEDURE — 99291 CRITICAL CARE FIRST HOUR: CPT | Performed by: EMERGENCY MEDICINE

## 2019-11-26 RX ADMIN — ACETAMINOPHEN 975 MG: 325 TABLET ORAL at 13:00

## 2019-11-26 RX ADMIN — ROPIVACAINE HYDROCHLORIDE: 5 INJECTION, SOLUTION EPIDURAL; INFILTRATION; PERINEURAL at 15:25

## 2019-11-26 RX ADMIN — GABAPENTIN 300 MG: 300 CAPSULE ORAL at 21:24

## 2019-11-26 RX ADMIN — ACETAMINOPHEN 975 MG: 325 TABLET ORAL at 21:24

## 2019-11-26 RX ADMIN — METHOCARBAMOL TABLETS 500 MG: 500 TABLET, COATED ORAL at 13:00

## 2019-11-26 RX ADMIN — SERTRALINE HYDROCHLORIDE 100 MG: 100 TABLET ORAL at 09:01

## 2019-11-26 RX ADMIN — HEPARIN SODIUM 5000 UNITS: 5000 INJECTION INTRAVENOUS; SUBCUTANEOUS at 09:01

## 2019-11-26 RX ADMIN — SENNOSIDES 8.6 MG: 8.6 TABLET, FILM COATED ORAL at 21:24

## 2019-11-26 RX ADMIN — POTASSIUM PHOSPHATE, MONOBASIC AND POTASSIUM PHOSPHATE, DIBASIC 21 MMOL: 224; 236 INJECTION, SOLUTION INTRAVENOUS at 09:38

## 2019-11-26 RX ADMIN — METHOCARBAMOL TABLETS 500 MG: 500 TABLET, COATED ORAL at 17:46

## 2019-11-26 RX ADMIN — PREDNISONE 10 MG: 10 TABLET ORAL at 09:02

## 2019-11-26 RX ADMIN — DOCUSATE SODIUM 100 MG: 100 CAPSULE, LIQUID FILLED ORAL at 17:46

## 2019-11-26 RX ADMIN — ACETAMINOPHEN 975 MG: 325 TABLET ORAL at 06:05

## 2019-11-26 RX ADMIN — METHOCARBAMOL TABLETS 500 MG: 500 TABLET, COATED ORAL at 00:17

## 2019-11-26 RX ADMIN — DOCUSATE SODIUM 100 MG: 100 CAPSULE, LIQUID FILLED ORAL at 09:01

## 2019-11-26 RX ADMIN — HEPARIN SODIUM 5000 UNITS: 5000 INJECTION INTRAVENOUS; SUBCUTANEOUS at 21:24

## 2019-11-26 RX ADMIN — METHOCARBAMOL TABLETS 500 MG: 500 TABLET, COATED ORAL at 06:05

## 2019-11-26 RX ADMIN — OXYCODONE HYDROCHLORIDE 5 MG: 5 TABLET ORAL at 17:47

## 2019-11-26 RX ADMIN — LIDOCAINE 1 PATCH: 50 PATCH CUTANEOUS at 09:01

## 2019-11-26 NOTE — PLAN OF CARE
Problem: Prexisting or High Potential for Compromised Skin Integrity  Goal: Skin integrity is maintained or improved  Description  INTERVENTIONS:  - Identify patients at risk for skin breakdown  - Assess and monitor skin integrity  - Assess and monitor nutrition and hydration status  - Monitor labs   - Assess for incontinence   - Turn and reposition patient  - Assist with mobility/ambulation  - Relieve pressure over bony prominences  - Avoid friction and shearing  - Provide appropriate hygiene as needed including keeping skin clean and dry  - Evaluate need for skin moisturizer/barrier cream  - Collaborate with interdisciplinary team   - Patient/family teaching  - Consider wound care consult   Outcome: Progressing     Problem: Potential for Falls  Goal: Patient will remain free of falls  Description  INTERVENTIONS:  - Assess patient frequently for physical needs  -  Identify cognitive and physical deficits and behaviors that affect risk of falls  -  [place] fall precautions as indicated by assessment   - Educate patient/family on patient safety including physical limitations  - Instruct patient to call for assistance with activity based on assessment  - Modify environment to reduce risk of injury  - Consider OT/PT consult to assist with strengthening/mobility  Outcome: Progressing     Problem: NEUROSENSORY - ADULT  Goal: Achieves maximal functionality and self care  Description  INTERVENTIONS  - Monitor swallowing and airway patency with patient fatigue and changes in neurological status  - Encourage and assist patient to increase activity and self care     - Encourage visually impaired, hearing impaired and aphasic patients to use assistive/communication devices  Outcome: Progressing     Problem: CARDIOVASCULAR - ADULT  Goal: Maintains optimal cardiac output and hemodynamic stability  Description  INTERVENTIONS:  - Monitor I/O, vital signs and rhythm  - Monitor for S/S and trends of decreased cardiac output  - Administer and titrate ordered vasoactive medications to optimize hemodynamic stability  - Assess quality of pulses, skin color and temperature  - Assess for signs of decreased coronary artery perfusion  - Instruct patient to report change in severity of symptoms  Outcome: Progressing     Problem: RESPIRATORY - ADULT  Goal: Achieves optimal ventilation and oxygenation  Description  INTERVENTIONS:  - Assess for changes in respiratory status  - Assess for changes in mentation and behavior  - Position to facilitate oxygenation and minimize respiratory effort  - Oxygen administered by appropriate delivery if ordered  - Initiate smoking cessation education as indicated  - Encourage broncho-pulmonary hygiene including cough, deep breathe, Incentive Spirometry  - Assess the need for suctioning and aspirate as needed  - Assess and instruct to report SOB or any respiratory difficulty  - Respiratory Therapy support as indicated  Outcome: Progressing     Problem: GENITOURINARY - ADULT  Goal: Maintains or returns to baseline urinary function  Description  INTERVENTIONS:  - Assess urinary function  - Encourage oral fluids to ensure adequate hydration if ordered  - Administer IV fluids as ordered to ensure adequate hydration  - Administer ordered medications as needed  - Offer frequent toileting  - Follow urinary retention protocol if ordered  Outcome: Progressing     Problem: METABOLIC, FLUID AND ELECTROLYTES - ADULT  Goal: Electrolytes maintained within normal limits  Description  INTERVENTIONS:  - Monitor labs and assess patient for signs and symptoms of electrolyte imbalances  - Administer electrolyte replacement as ordered  - Monitor response to electrolyte replacements, including repeat lab results as appropriate  - Instruct patient on fluid and nutrition as appropriate  Outcome: Progressing  Goal: Fluid balance maintained  Description  INTERVENTIONS:  - Monitor labs   - Monitor I/O and WT  - Instruct patient on fluid and nutrition as appropriate  - Assess for signs & symptoms of volume excess or deficit  Outcome: Progressing     Problem: SKIN/TISSUE INTEGRITY - ADULT  Goal: Skin integrity remains intact  Description  INTERVENTIONS  - Identify patients at risk for skin breakdown  - Assess and monitor skin integrity  - Assess and monitor nutrition and hydration status  - Monitor labs (i e  albumin)  - Assess for incontinence   - Turn and reposition patient  - Assist with mobility/ambulation  - Relieve pressure over bony prominences  - Avoid friction and shearing  - Provide appropriate hygiene as needed including keeping skin clean and dry  - Evaluate need for skin moisturizer/barrier cream  - Collaborate with interdisciplinary team (i e  Nutrition, Rehabilitation, etc )   - Patient/family teaching  Outcome: Progressing     Problem: HEMATOLOGIC - ADULT  Goal: Maintains hematologic stability  Description  INTERVENTIONS  - Assess for signs and symptoms of bleeding or hemorrhage  - Monitor labs  - Administer supportive blood products/factors as ordered and appropriate  Outcome: Progressing     Problem: MUSCULOSKELETAL - ADULT  Goal: Maintain or return mobility to safest level of function  Description  INTERVENTIONS:  - Assess patient's ability to carry out ADLs; assess patient's baseline for ADL function and identify physical deficits which impact ability to perform ADLs (bathing, care of mouth/teeth, toileting, grooming, dressing, etc )  - Assess/evaluate cause of self-care deficits   - Assess range of motion  - Assess patient's mobility  - Assess patient's need for assistive devices and provide as appropriate  - Encourage maximum independence but intervene and supervise when necessary  - Involve family in performance of ADLs  - Assess for home care needs following discharge   - Consider OT consult to assist with ADL evaluation and planning for discharge  - Provide patient education as appropriate  Outcome: Progressing     Problem: Nutrition/Hydration-ADULT  Goal: Nutrient/Hydration intake appropriate for improving, restoring or maintaining nutritional needs  Description  Monitor and assess patient's nutrition/hydration status for malnutrition  Collaborate with interdisciplinary team and initiate plan and interventions as ordered  Monitor patient's weight and dietary intake as ordered or per policy  Utilize nutrition screening tool and intervene as necessary  Determine patient's food preferences and provide high-protein, high-caloric foods as appropriate       INTERVENTIONS:  - Monitor oral intake, urinary output, labs, and treatment plans  - Assess nutrition and hydration status and recommend course of action  - Evaluate amount of meals eaten  - Assist patient with eating if necessary   - Allow adequate time for meals  - Recommend/ encourage appropriate diets, oral nutritional supplements, and vitamin/mineral supplements  - Order, calculate, and assess calorie counts as needed  - Recommend, monitor, and adjust tube feedings and TPN/PPN based on assessed needs  - Assess need for intravenous fluids  - Provide specific nutrition/hydration education as appropriate  - Include patient/family/caregiver in decisions related to nutrition  Outcome: Progressing     Problem: PAIN - ADULT  Goal: Verbalizes/displays adequate comfort level or baseline comfort level  Description  Interventions:  - Encourage patient to monitor pain and request assistance  - Assess pain using appropriate pain scale  - Administer analgesics based on type and severity of pain and evaluate response  - Implement non-pharmacological measures as appropriate and evaluate response  - Consider cultural and social influences on pain and pain management  - Notify physician/advanced practitioner if interventions unsuccessful or patient reports new pain  Outcome: Progressing     Problem: INFECTION - ADULT  Goal: Absence or prevention of progression during hospitalization  Description  INTERVENTIONS:  - Assess and monitor for signs and symptoms of infection  - Monitor lab/diagnostic results  - Monitor all insertion sites, i e  indwelling lines, tubes, and drains  - Monitor endotracheal if appropriate and nasal secretions for changes in amount and color  - [place] appropriate cooling/warming therapies per order  - Administer medications as ordered  - Instruct and encourage patient and family to use good hand hygiene technique  - Identify and instruct in appropriate isolation precautions for identified infection/condition  Outcome: Progressing  Goal: Absence of fever/infection during neutropenic period  Description  INTERVENTIONS:  - Monitor WBC    Outcome: Progressing     Problem: SAFETY ADULT  Goal: Maintain or return to baseline ADL function  Description  INTERVENTIONS:  -  Assess patient's ability to carry out ADLs; assess patient's baseline for ADL function and identify physical deficits which impact ability to perform ADLs (bathing, care of mouth/teeth, toileting, grooming, dressing, etc )  - Assess/evaluate cause of self-care deficits   - Assess range of motion  - Assess patient's mobility; develop plan if impaired  - Assess patient's need for assistive devices and provide as appropriate  - Encourage maximum independence but intervene and supervise when necessary  - Involve family in performance of ADLs  - Assess for home care needs following discharge   - Consider OT consult to assist with ADL evaluation and planning for discharge  - Provide patient education as appropriate  Outcome: Progressing  Goal: Maintain or return mobility status to optimal level  Description  INTERVENTIONS:  - Assess patient's baseline mobility status (ambulation, transfers, stairs, etc )    - Identify cognitive and physical deficits and behaviors that affect mobility  - Identify mobility aids required to assist with transfers and/or ambulation (gait belt, sit-to-stand, lift, walker, cane, etc )  - [place] fall precautions as indicated by assessment  - Record patient progress and toleration of activity level on Mobility SBAR; progress patient to next Phase/Stage  - Instruct patient to call for assistance with activity based on assessment  - Consider rehabilitation consult to assist with strengthening/weightbearing, etc   Outcome: Progressing     Problem: DISCHARGE PLANNING  Goal: Discharge to home or other facility with appropriate resources  Description  INTERVENTIONS:  - Identify barriers to discharge w/patient and caregiver  - Arrange for needed discharge resources and transportation as appropriate  - Identify discharge learning needs (meds, wound care, etc )  - Arrange for interpretive services to assist at discharge as needed  - Refer to Case Management Department for coordinating discharge planning if the patient needs post-hospital services based on physician/advanced practitioner order or complex needs related to functional status, cognitive ability, or social support system  Outcome: Progressing     Problem: Knowledge Deficit  Goal: Patient/family/caregiver demonstrates understanding of disease process, treatment plan, medications, and discharge instructions  Description  Complete learning assessment and assess knowledge base    Interventions:  - Provide teaching at level of understanding  - Provide teaching via preferred learning methods  Outcome: Progressing     Problem: COPING  Goal: Pt/Family able to verbalize concerns and demonstrate effective coping strategies  Description  INTERVENTIONS:  - Assist patient/family to identify coping skills, available support systems and cultural and spiritual values  - Provide emotional support, including active listening and acknowledgement of concerns of patient and caregivers  - Reduce environmental stimuli, as able  - Provide patient education  - Assess for spiritual pain/suffering and initiate spiritual care, including notification of Pastoral Care or matt based community as needed  - Assess effectiveness of coping strategies  Outcome: Progressing  Goal: Will report anxiety at manageable levels  Description  INTERVENTIONS:  - Administer medication as ordered  - Teach and encourage coping skills  - Provide emotional support  - Assess patient/family for anxiety and ability to cope  Outcome: Progressing

## 2019-11-26 NOTE — PROGRESS NOTES
Progress Note - Acute Pain Service    [facility] 81 y o  female MRN: [id number]  Unit/Bed#: ICU 14 Encounter: [id number]      Assessment:   Principal Problem:    Anterior shoulder dislocation, right, initial encounter  Active Problems:    Multiple fractures of ribs, left side, initial encounter for closed fracture    Fall down stairs    Acute respiratory failure with hypoxia (HCC)    Leukocytosis    Acute pain due to trauma    Right foot pain      Plan:   Continue Epidural at 10/4/10/3 with ropi-fentanyl  APS will continue to follow; please contact APS ( btwn [phone]) with any further questions    Pain History  Current pain location(s): foot  Pain Scale:   3  Quality: sharp  24 hour history: improved rib pain with epidural  On hfnc which was weaned off this morning  Satisfactory analgesia with epidural  Using demand feature appropriately  Opioid requirement previous 24 hours: none systemic   Has ropi-fent epidural      Meds/Allergies   all current active meds have been reviewed, current meds:   Current Facility-Administered Medications   Medication Dose Route Frequency    acetaminophen (TYLENOL) tablet 975 mg  975 mg Oral Q8H [address]    bisacodyl (DULCOLAX) rectal suppository 10 mg  10 mg Rectal Daily PRN    docusate sodium (COLACE) capsule 100 mg  100 mg Oral BID    gabapentin (NEURONTIN) capsule 300 mg  300 mg Oral HS    heparin (porcine) subcutaneous injection 5,000 Units  5,000 Units Subcutaneous Q12H [address]    HYDROmorphone (DILAUDID) injection 0 5 mg  0 5 mg Intravenous Q3H PRN    lidocaine (LIDODERM) 5 % patch 1 patch  1 patch Topical Daily    methocarbamol (ROBAXIN) tablet 500 mg  500 mg Oral Q6H [address]    ondansetron (ZOFRAN) injection 4 mg  4 mg Intravenous Q6H PRN    oxyCODONE (ROXICODONE) IR tablet 2 5 mg  2 5 mg Oral Q4H PRN    oxyCODONE (ROXICODONE) IR tablet 5 mg  5 mg Oral Q4H PRN    predniSONE tablet 10 mg  10 mg Oral Daily    ropivacaine 0 1% and fentaNYL 2 mcg/mL PCEA Epidural Continuous    senna (SENOKOT) tablet 8 6 mg  1 tablet Oral HS    sertraline (ZOLOFT) tablet 100 mg  100 mg Oral Daily    and PTA meds:   None       No Known Allergies    Objective     Temp:  [98 °F (36 7 °C)-99 °F (37 2 °C)] 99 °F (37 2 °C)  HR:  [] 84  Resp:  [10-29] 19  BP: ()/(45-73) 87/51  FiO2 (%):  [60-95] 65    Physical Exam   Constitutional: Vital signs are normal  She appears well-developed and well-nourished  She is cooperative  She does not appear ill  HENT:   Head: Normocephalic and atraumatic  Eyes: Pupils are equal, round, and reactive to light  Cardiovascular: Normal rate, regular rhythm, S1 normal, S2 normal and normal heart sounds  Pulmonary/Chest: No accessory muscle usage  No tachypnea  No respiratory distress  She has decreased breath sounds  Abdominal: Normal appearance and bowel sounds are normal  There is no tenderness  Neurological: She is alert  GCS eye subscore is 4  GCS verbal subscore is 5  Lab Results:   Results from last 7 days   Lab Units 11/26/19  0457   WBC Thousand/uL 10 32*   HEMOGLOBIN g/dL 10 0*   HEMATOCRIT % 31 7*   PLATELETS Thousands/uL 155      Results from last 7 days   Lab Units 11/26/19 0457  11/23/19  1632   POTASSIUM mmol/L 3 7   < >  --    CHLORIDE mmol/L 103   < >  --    CO2 mmol/L 28   < >  --    CO2, I-STAT mmol/L  --   --  25   BUN mg/dL 9   < >  --    CREATININE mg/dL 0 56*   < >  --    CALCIUM mg/dL 8 0*   < >  --    GLUCOSE, ISTAT mg/dl  --   --  130    < > = values in this interval not displayed  Imaging Studies: I have personally reviewed pertinent reports  EKG, Pathology, and Other Studies: I have personally reviewed pertinent reports  Counseling / Coordination of Care  Total floor / unit time spent today 15 minutes  Greater than 50% of total time was spent with the patient and / or family counseling and / or coordination of care   A description of the counseling / coordination of care: discussion of epidural analgesia for rib fractures    Poornima Brandt DO  Acute Pain Service

## 2019-11-26 NOTE — SOCIAL WORK
Pt is recommended for home therapies  CM spoke to pt and her   Both are in agreement with this discharge  There is no specific preference for [facility]  CM will place referrals

## 2019-11-26 NOTE — PROGRESS NOTES
11/26/19 1400   Clinical Encounter Type   Routine Visit Follow-up   [facility] Encounters   [facility] Needs Prayer   Sacramental Encounters   Sacrament of Sick-Anointing Anointed

## 2019-11-26 NOTE — PROGRESS NOTES
Daily Progress Note - Rodri [age] y o  female MRN: [id number]  Unit/Bed#: ICU 14 Encounter: [id number]        ----------------------------------------------------------------------------------------  HPI/24hr events:  Patient had  Epidural placed yesterday with great pain relief  Downtrending high-flow nasal cannula settings    ---------------------------------------------------------------------------------------  SUBJECTIVE   this morning, patient's pain in her chest is much improved  Complaining of some right foot tenderness that she noticed starting yesterday  Otherwise no complaints  Still having urinary retention    Review of Systems   Respiratory: Positive for chest tightness and shortness of breath  Genitourinary: Positive for difficulty urinating  Musculoskeletal: Positive for arthralgias ( right foot)  All other systems reviewed and are negative  Review of systems was reviewed and negative unless stated above in HPI/24-hour events   ---------------------------------------------------------------------------------------  Assessment and Plan:    Neuro:    Diagnosis:  Acute pain secondary to trauma  o Plan:  Patient failed rib fracture protocol  Had epidural placed yesterday by anesthesia with great result  Continues on scheduled Neurontin, Lidoderm patch, scheduled Tylenol  Required no p r n  Narcotics  Continue epidural   Acute Pain service following, appreciate new recommendations      CV:    no issues      Pulm:   Diagnosis:  Acute hypoxic respiratory failure  o Plan:  Secondary to rib fractures in poor inspiratory effort  With aid of epidural, patient now consistently greater than 500 on sound spirometer  Less pain  Will get patient out of bed today and hope to liberate from high-flow nasal cannula  Continue  incentive spirometry, encouraging  Coughing and deep breathing  Airway clearance protocol    o    Diagnosis:  Multiple right-sided rib fractures  o Plan:  Pain control plan as per above      GI:    no issues      :    Diagnosis:  Urinary retention  o Plan:  Likely secondary to epidural, placed on urinary retention protocol        F/E/N:    no issues      Heme/Onc:    no issues      Endo:         ID:    Diagnosis:  leukocytosis  o Plan:  Likely secondary to atelectasis and stress  No fevers, no indication for antibiotics  MSK/Skin:    Diagnosis:  Mechanical fall  o Plan:   PT OT consultation today   Diagnosis:  Right anterior shoulder dislocation with Hill-Sachs and Bankart lesion  o Plan:  Ortho following, nonweightbearing in sling   Diagnosis:   Right foot pain  o Plan:  Etiology unclear, will obtain right foot x-ray    Disposition: Transfer to Stepdown Level 1   Code Status: Level 1 - Full Code  ---------------------------------------------------------------------------------------  ICU CORE MEASURES    Prophylaxis   VTE Pharmacologic Prophylaxis: Heparin  VTE Mechanical Prophylaxis: sequential compression device  Stress Ulcer Prophylaxis: Prophylaxis Not Indicated     ABCDE Protocol (if indicated)  Plan to perform spontaneous awakening trial today? Not applicable  Plan to perform spontaneous breathing trial today? Not applicable  Obvious barriers to extubation? Not applicable  CAM-ICU: Negative    Invasive Devices Review  Invasive Devices     Peripheral Intravenous Line            Peripheral IV 11/23/19 Left Antecubital 2 days    Peripheral IV 11/24/19 Left Forearm 1 day          Epidural Line            Epidural Catheter 11/25/19 less than 1 day              Can any invasive devices be discontinued today? Not applicable  ---------------------------------------------------------------------------------------  OBJECTIVE    Physical Exam   Constitutional: She is oriented to person, place, and time  She appears well-developed and well-nourished  Elderly female appears approximately stated age, no apparent distress    Mild tachypnea  High-flow nasal cannula in place  HENT:   Head: Normocephalic and atraumatic  Eyes: Pupils are equal, round, and reactive to light  EOM are normal    Neck: Normal range of motion  Neck supple  No JVD present  Cardiovascular: Normal rate, regular rhythm and normal heart sounds  No murmur heard  Pulmonary/Chest: No accessory muscle usage  Tachypnea ( mild) noted  She has decreased breath sounds in the right lower field and the left lower field  Incentive spirometry greater than 500   Abdominal: Soft  Bowel sounds are normal  She exhibits no distension  There is no tenderness  Genitourinary:   Genitourinary Comments: Deferred   Musculoskeletal:   Right shoulder in splint  Neurovascularly intact  Right foot without any bruising or obvious deformities  Neurological: She is alert and oriented to person, place, and time  No cranial nerve deficit  Skin: Skin is warm and dry  Capillary refill takes less than 2 seconds  Nursing note and vitals reviewed  Vitals   Vitals:    19 0328 19 0400 19 0500 19 0600   BP:  109/62 110/61 (!) 87/51   Pulse:  80 84 84   Resp:  19   Temp:  99 °F (37 2 °C)     TempSrc:  Oral     SpO2: 96% 94% 93% 96%   Weight:    80 2 kg (176 lb 12 9 oz)   Height:         Temp (24hrs), Av 5 °F (36 9 °C), Min:98 °F (36 7 °C), Max:99 °F (37 2 °C)  Current: Temperature: 99 °F (37 2 °C)    Invasive/non-invasive ventilation settings   Respiratory    Lab Data (Last 4 hours)    None         O2/Vent Data (Last 4 hours)    None                Height and Weights   Height: 5' 2" (157 5 cm)  IBW: 50 1 kg  Body mass index is 32 34 kg/m²  Weight (last 2 days)     Date/Time   Weight    19 06   80 2 (176 81)    19 06   79 9 (176 15)    19 06   79 2 (174 6)                Intake and Output  I/O        07 -  0700  07 -  0700  07 -  0700    P  O  1650 118     I V  (mL/kg)  1503 3 (18 7) Total Intake(mL/kg) 1650 (20 7) 1621 3 (20 2)     Urine (mL/kg/hr) 800 (0 4) 1475 (0 8)     Emesis/NG output       Total Output 800 1475     Net +850 +146 3                  Nutrition       Diet Orders   (From admission, onward)             Start     Ordered    11/23/19 1852  Diet Regular; Regular House  Diet effective now     Question Answer Comment   Diet Type Regular    Regular Regular House    RD to adjust diet per protocol?  Yes        11/23/19 1851                Laboratory and Diagnostics:  Results from last 7 days   Lab Units 11/26/19  0457 11/24/19  1557 11/23/19  1641 11/23/19  1632   WBC Thousand/uL 10 32* 11 04* 9 81  --    HEMOGLOBIN g/dL 10 0* 11 5 12 4  --    I STAT HEMOGLOBIN g/dl  --   --   --  12 9   HEMATOCRIT % 31 7* 37 2 39 4  --    HEMATOCRIT, ISTAT %  --   --   --  38   PLATELETS Thousands/uL 155 172 265  --    NEUTROS PCT % 84* 77* 69  --    MONOS PCT % 7 11 6  --      Results from last 7 days   Lab Units 11/26/19  0457 11/24/19  0536 11/23/19  1632   SODIUM mmol/L 137 141  --    POTASSIUM mmol/L 3 7 4 3  --    CHLORIDE mmol/L 103 110*  --    CO2 mmol/L 28 24  --    CO2, I-STAT mmol/L  --   --  25   ANION GAP mmol/L 6 7  --    BUN mg/dL 9 23  --    CREATININE mg/dL 0 56* 0 98  --    CALCIUM mg/dL 8 0* 8 3  --    GLUCOSE RANDOM mg/dL 92 129  --      Results from last 7 days   Lab Units 11/26/19  0457 11/24/19  0536   MAGNESIUM mg/dL 2 0 2 1   PHOSPHORUS mg/dL 2 1* 4 3*      Results from last 7 days   Lab Units 11/23/19  1641   INR  0 98   PTT seconds 22*              ABG:  Results from last 7 days   Lab Units 11/25/19  1301   PH ART  7 332*   PCO2 ART mm Hg 44 3*   PO2 ART mm Hg 83 3   HCO3 ART mmol/L 22 9   BASE EXC ART mmol/L -3 0   ABG SOURCE  Radial, Left     VBG:  Results from last 7 days   Lab Units 11/25/19  1301   ABG SOURCE  Radial, Left           Micro        EKG: Reviewed  Imaging:  I have personally reviewed pertinent films in PACS    Active Medications  Scheduled Meds:    Current Facility-Administered Medications:  acetaminophen 975 mg Oral [facility] Niya Rodriguez MD    bisacodyl 10 mg Rectal Daily PRN Mercedes Bullock PA-C    docusate sodium 100 mg Oral BID Mercedes Bullock PA-C    gabapentin 300 mg Oral HS Niya Rodriguez MD    heparin (porcine) 5,000 Units Subcutaneous Q12H [address] Mercedes Bullock PA-C    HYDROmorphone 0 5 mg Intravenous Q3H PRN Mercedes Bullock PA-C    lidocaine 1 patch Topical Daily Niya Rodriguez MD    methocarbamol 500 mg Oral Q6H [address] Niya Rodriguez MD    multi-electrolyte 50 mL/hr Intravenous Continuous Tylor Benjamin Last Rate: 50 mL/hr (11/25/19 1833)   ondansetron 4 mg Intravenous Q6H PRN Niya Rodriguez MD    oxyCODONE 2 5 mg Oral Q4H PRN Mercedes Bullock PA-C    oxyCODONE 5 mg Oral Q4H PRN Mercedes Bullock PA-C    potassium phosphate 21 mmol Intravenous Once Carlito Lerma MD    predniSONE 10 mg Oral Daily Rick A Paster, DO    ropivacaine 0 1% and fentaNYL 2 mcg/mL  Epidural Continuous Katt Wiggins MD    senna 1 tablet Oral HS Keenan Caro MD    sertraline 100 mg Oral Daily Rick A Paster, DO      Continuous Infusions:    multi-electrolyte 50 mL/hr Last Rate: 50 mL/hr (11/25/19 1833)   ropivacaine 0 1% and fentaNYL 2 mcg/mL       PRN Meds:     bisacodyl 10 mg Daily PRN   HYDROmorphone 0 5 mg Q3H PRN   ondansetron 4 mg Q6H PRN   oxyCODONE 2 5 mg Q4H PRN   oxyCODONE 5 mg Q4H PRN       Allergies   No Known Allergies    Advance Directive and Living Will:      Power of :    POLST:        Counseling / Coordination of Care  Total time spent today 42 minutes  Greater than 50% of total time was spent with the patient and / or family counseling and / or coordination of care  Mercedes Bullock PA-C        Portions of the record may have been created with voice recognition software  Occasional wrong word or "sound a like" substitutions may have occurred due to the inherent limitations of voice recognition software    Read the chart carefully and recognize, using context, where substitutions have occurred

## 2019-11-26 NOTE — PROGRESS NOTES
Progress Note - Theresa Ching 1950, 81 y o  female MRN: [id number]    Unit/Bed#: [facility] 061-85 Encounter: [id number]    Primary Care Provider: Florentino Prather MD   Date and time admitted to hospital: 11/23/2019  4:25 PM        Right foot pain  Assessment & Plan  - Xray pending    Acute pain due to trauma  Assessment & Plan  ? Patient failed rib fracture protocol  Had epidural placed yesterday by anesthesia with great result  Continues on scheduled Neurontin, Lidoderm patch, scheduled Tylenol  Required no p r n  Narcotics  Continue epidural   Acute Pain service following, appreciate new recommendations    Leukocytosis  Assessment & Plan  - Likely reactive vs 2/2 atelectasis  - No indication for ABX     Acute respiratory failure with hypoxia [facility])  Assessment & Plan  ? Secondary to rib fractures in poor inspiratory effort  With aid of epidural, patient now consistently greater than 500 on sound spirometer  Less pain  Will get patient out of bed today and hope to liberate from high-flow nasal cannula  Continue  incentive spirometry, encouraging  Coughing and deep breathing  Airway clearance protocol  Fall down stairs  Assessment & Plan  PT/OT pending    Multiple fractures of ribs, left side, initial encounter for closed fracture  Assessment & Plan  ? Pain control plan as per above    * Anterior shoulder dislocation, right, initial encounter  Assessment & Plan  ? Ortho following, nonweightbearing in sling  Code Status: Level 1 - Full Code  POA:    POLST:      Reason for ICU admission:   High Flow Nasal Cannula    Active problems:   Principal Problem:    Anterior shoulder dislocation, right, initial encounter  Active Problems:    Multiple fractures of ribs, left side, initial encounter for closed fracture    Fall down stairs    Acute respiratory failure with hypoxia (HCC)    Leukocytosis    Acute pain due to trauma    Right foot pain  Resolved Problems:    * No resolved hospital problems  *      Consultants:   APS    History of Present Illness:   Per Consult by Aram Gerard MD DOS 11/23/19  "81 y o  female w/ no reported PMH/PSH, who presents as a Level B Trauma, s/p mechanical fall down 8 concrete steps, striking her head and L side  Per pt and her family, she tripped and fell down the flight of steps  Upon arrival to [facility], pt had GCS of 15, A&O x4  CT Chest revealed multiple L-sided rib fx (lateral and posterior 8th and 9th ribs, shaft width displacement of posterior L 10th rib), with minimal subpleural hematoma, as well as an anterior R subcoracoid humeral dislocation with associated Hill-Sachs and osseous Bankart fractures  CTH showed large posterior scalp hematoma and laceration without any adjacent skull fx or hemorrhage  CT C-spine negative  "    Summary of clinical course:   Patient was brought to step-down unit  She was initiated on high-flow nasal cannula  She was ordered rib fracture protocol, however unfortunately failed to improve  She had acute pain consult ordered yesterday, 11-25  Epidural was placed and since has had significant pain relief and decrease in oxygen requirement as well as incentive spirometry  From a high orthopedic standpoint, patient has a sling on her right shoulder after dislocation, she is non-weight bear  Ortho was following  Today, she complained of right foot pain  There is no obvious deformities, bruising, or point tenderness, however I did order an x-ray that needs to be followed up  Otherwise, she is improving on current pain regimen with epidural in place  She needs PT/OT to see her    She initially had    Recent or scheduled procedures:   Epidural placement:  11-25    Outstanding/pending diagnostics:   None    Cultures:   None       Mobilization Plan:   Non weightbear right upper extremity    Nutrition Plan:   Regular diet    VTE Pharmacologic Prophylaxis: Heparin epidural dosing  VTE Mechanical Prophylaxis: sequential compression device    Discharge Plan:   Patient should be ready for discharge to home or short-term rehab depending on recommendations in next 48-72 hours    Initial Physical Therapy Recommendations:  Pending  Initial Occupational Therapy Recommendations:  Pending  Initial /Plan:  Following    Home medications that are not reordered and reason why:   None      Spoke with ABA Bradley  regarding transfer  Please call [phone] with any questions or concerns  Portions of the record may have been created with voice recognition software  Occasional wrong word or "sound a like" substitutions may have occurred due to the inherent limitations of voice recognition software  Read the chart carefully and recognize, using context, where substitutions have occurred  Code Status: Level 1 - Full Code  POA:    POLST:

## 2019-11-26 NOTE — ASSESSMENT & PLAN NOTE
? Patient failed rib fracture protocol  Had epidural placed yesterday by anesthesia with great result  Continues on scheduled Neurontin, Lidoderm patch, scheduled Tylenol  Required no p r n  Narcotics    Continue epidural   Acute Pain service following, appreciate new recommendations

## 2019-11-26 NOTE — ASSESSMENT & PLAN NOTE
? Secondary to rib fractures in poor inspiratory effort  With aid of epidural, patient now consistently greater than 500 on sound spirometer  Less pain  Will get patient out of bed today and hope to liberate from high-flow nasal cannula  Continue  incentive spirometry, encouraging  Coughing and deep breathing  Airway clearance protocol

## 2019-11-26 NOTE — RESPIRATORY THERAPY NOTE
RT Protocol Note  Sinai De León 81 y o  female MRN: [id number]  Unit/Bed#: ICU 14 Encounter: [id number]      Resp Comments: (P) pt ordered on capnography due to pca pump  unable to do with pt on HFNC

## 2019-11-27 PROBLEM — D72.829 LEUKOCYTOSIS: Status: RESOLVED | Noted: 2019-11-25 | Resolved: 2019-11-27

## 2019-11-27 PROBLEM — E87.6 HYPOKALEMIA: Status: ACTIVE | Noted: 2019-11-27

## 2019-11-27 LAB
ANION GAP SERPL CALCULATED.3IONS-SCNC: 5 MMOL/L (ref 4–13)
BUN SERPL-MCNC: 7 MG/DL (ref 5–25)
CALCIUM SERPL-MCNC: 8.2 MG/DL (ref 8.3–10.1)
CHLORIDE SERPL-SCNC: 105 MMOL/L (ref 100–108)
CO2 SERPL-SCNC: 28 MMOL/L (ref 21–32)
CREAT SERPL-MCNC: 0.51 MG/DL (ref 0.6–1.3)
ERYTHROCYTE [DISTWIDTH] IN BLOOD BY AUTOMATED COUNT: 13.9 % (ref 11.6–15.1)
GFR SERPL CREATININE-BSD FRML MDRD: 100 ML/MIN/1.73SQ M
GLUCOSE SERPL-MCNC: 97 MG/DL (ref 65–140)
HCT VFR BLD AUTO: 31.2 % (ref 34.8–46.1)
HGB BLD-MCNC: 10 G/DL (ref 11.5–15.4)
MAGNESIUM SERPL-MCNC: 2.1 MG/DL (ref 1.6–2.6)
MCH RBC QN AUTO: 29.6 PG (ref 26.8–34.3)
MCHC RBC AUTO-ENTMCNC: 32.1 G/DL (ref 31.4–37.4)
MCV RBC AUTO: 92 FL (ref 82–98)
PLATELET # BLD AUTO: 190 THOUSANDS/UL (ref 149–390)
PMV BLD AUTO: 10.4 FL (ref 8.9–12.7)
POTASSIUM SERPL-SCNC: 3.3 MMOL/L (ref 3.5–5.3)
RBC # BLD AUTO: 3.38 MILLION/UL (ref 3.81–5.12)
SODIUM SERPL-SCNC: 138 MMOL/L (ref 136–145)
WBC # BLD AUTO: 9.81 THOUSAND/UL (ref 4.31–10.16)

## 2019-11-27 PROCEDURE — 80048 BASIC METABOLIC PNL TOTAL CA: CPT | Performed by: PHYSICIAN ASSISTANT

## 2019-11-27 PROCEDURE — 85027 COMPLETE CBC AUTOMATED: CPT | Performed by: PHYSICIAN ASSISTANT

## 2019-11-27 PROCEDURE — 94760 N-INVAS EAR/PLS OXIMETRY 1: CPT

## 2019-11-27 PROCEDURE — 99232 SBSQ HOSP IP/OBS MODERATE 35: CPT | Performed by: SURGERY

## 2019-11-27 PROCEDURE — 94668 MNPJ CHEST WALL SBSQ: CPT

## 2019-11-27 PROCEDURE — 0RSJXZZ REPOSITION RIGHT SHOULDER JOINT, EXTERNAL APPROACH: ICD-10-PCS | Performed by: ORTHOPAEDIC SURGERY

## 2019-11-27 PROCEDURE — NC001 PR NO CHARGE: Performed by: SURGERY

## 2019-11-27 PROCEDURE — 83735 ASSAY OF MAGNESIUM: CPT | Performed by: PHYSICIAN ASSISTANT

## 2019-11-27 PROCEDURE — 94660 CPAP INITIATION&MGMT: CPT

## 2019-11-27 PROCEDURE — 97116 GAIT TRAINING THERAPY: CPT

## 2019-11-27 RX ORDER — POTASSIUM CHLORIDE 14.9 MG/ML
20 INJECTION INTRAVENOUS
Status: COMPLETED | OUTPATIENT
Start: 2019-11-27 | End: 2019-11-27

## 2019-11-27 RX ORDER — POLYETHYLENE GLYCOL 3350 17 G/17G
17 POWDER, FOR SOLUTION ORAL DAILY
Status: DISCONTINUED | OUTPATIENT
Start: 2019-11-27 | End: 2019-12-02 | Stop reason: HOSPADM

## 2019-11-27 RX ADMIN — ACETAMINOPHEN 975 MG: 325 TABLET ORAL at 21:12

## 2019-11-27 RX ADMIN — METHOCARBAMOL TABLETS 500 MG: 500 TABLET, COATED ORAL at 12:01

## 2019-11-27 RX ADMIN — POTASSIUM CHLORIDE 20 MEQ: 14.9 INJECTION, SOLUTION INTRAVENOUS at 08:07

## 2019-11-27 RX ADMIN — POTASSIUM CHLORIDE 20 MEQ: 14.9 INJECTION, SOLUTION INTRAVENOUS at 10:04

## 2019-11-27 RX ADMIN — PREDNISONE 10 MG: 10 TABLET ORAL at 08:07

## 2019-11-27 RX ADMIN — METHOCARBAMOL TABLETS 500 MG: 500 TABLET, COATED ORAL at 00:02

## 2019-11-27 RX ADMIN — DOCUSATE SODIUM 100 MG: 100 CAPSULE, LIQUID FILLED ORAL at 17:16

## 2019-11-27 RX ADMIN — METHOCARBAMOL TABLETS 500 MG: 500 TABLET, COATED ORAL at 06:03

## 2019-11-27 RX ADMIN — POLYETHYLENE GLYCOL 3350 17 G: 17 POWDER, FOR SOLUTION ORAL at 12:01

## 2019-11-27 RX ADMIN — ACETAMINOPHEN 975 MG: 325 TABLET ORAL at 06:03

## 2019-11-27 RX ADMIN — ACETAMINOPHEN 975 MG: 325 TABLET ORAL at 14:11

## 2019-11-27 RX ADMIN — LIDOCAINE 1 PATCH: 50 PATCH CUTANEOUS at 08:07

## 2019-11-27 RX ADMIN — ROPIVACAINE HYDROCHLORIDE: 5 INJECTION, SOLUTION EPIDURAL; INFILTRATION; PERINEURAL at 15:40

## 2019-11-27 RX ADMIN — BISACODYL 10 MG: 10 SUPPOSITORY RECTAL at 17:16

## 2019-11-27 RX ADMIN — SERTRALINE HYDROCHLORIDE 100 MG: 100 TABLET ORAL at 08:07

## 2019-11-27 RX ADMIN — DOCUSATE SODIUM 100 MG: 100 CAPSULE, LIQUID FILLED ORAL at 08:07

## 2019-11-27 RX ADMIN — POTASSIUM CHLORIDE 20 MEQ: 14.9 INJECTION, SOLUTION INTRAVENOUS at 12:01

## 2019-11-27 RX ADMIN — HEPARIN SODIUM 5000 UNITS: 5000 INJECTION INTRAVENOUS; SUBCUTANEOUS at 08:07

## 2019-11-27 RX ADMIN — METHOCARBAMOL TABLETS 500 MG: 500 TABLET, COATED ORAL at 17:16

## 2019-11-27 RX ADMIN — HEPARIN SODIUM 5000 UNITS: 5000 INJECTION INTRAVENOUS; SUBCUTANEOUS at 21:12

## 2019-11-27 RX ADMIN — GABAPENTIN 300 MG: 300 CAPSULE ORAL at 21:12

## 2019-11-27 NOTE — PROGRESS NOTES
Progress Note - Acute Pain Service    [facility] 81 y o  female MRN: [id number]  Unit/Bed#: [facility] 617-01 Encounter: [id number]      Assessment:   Principal Problem:    Anterior shoulder dislocation, right, initial encounter  Active Problems:    Multiple fractures of ribs, left side, initial encounter for closed fracture    Fall down stairs    Acute respiratory failure with hypoxia (HCC)    Acute pain due to trauma    Right foot pain    Hypokalemia      Plan:   Continue thoracic epidural at 10/4/10/3  Encouraged demand use before activity  Continue scheduled APAP    APS will continue to follow; please contact APS ( [phone]) with any further questions    Pain History  Current pain location(s): left rib    Pain Scale:   0-2  Quality: sharp  24 hour history: satisfactory analgesia at rest and with movement    Had increase in oxygen to HFNC overnight which is now weaned to traditional NC    Opioid requirement previous 24 hours: no systemic, only in PCEA    Meds/Allergies   all current active meds have been reviewed, current meds:   Current Facility-Administered Medications   Medication Dose Route Frequency    acetaminophen (TYLENOL) tablet 975 mg  975 mg Oral Q8H [facility]    bisacodyl (DULCOLAX) rectal suppository 10 mg  10 mg Rectal Daily PRN    docusate sodium (COLACE) capsule 100 mg  100 mg Oral BID    gabapentin (NEURONTIN) capsule 300 mg  300 mg Oral HS    heparin (porcine) subcutaneous injection 5,000 Units  5,000 Units Subcutaneous Q12H [facility]    HYDROmorphone (DILAUDID) injection 0 5 mg  0 5 mg Intravenous Q3H PRN    lidocaine (LIDODERM) 5 % patch 1 patch  1 patch Topical Daily    methocarbamol (ROBAXIN) tablet 500 mg  500 mg Oral Q6H [facility]    ondansetron (ZOFRAN) injection 4 mg  4 mg Intravenous Q6H PRN    oxyCODONE (ROXICODONE) IR tablet 2 5 mg  2 5 mg Oral Q4H PRN    oxyCODONE (ROXICODONE) IR tablet 5 mg  5 mg Oral Q4H PRN    polyethylene glycol (MIRALAX) packet 17 g  17 g Oral Daily    potassium chloride 20 mEq IVPB (premix)  20 mEq Intravenous Q2H    predniSONE tablet 10 mg  10 mg Oral Daily    ropivacaine 0 1% and fentaNYL 2 mcg/mL PCEA   Epidural Continuous    senna (SENOKOT) tablet 8 6 mg  1 tablet Oral HS    sertraline (ZOLOFT) tablet 100 mg  100 mg Oral Daily    and PTA meds:   None       No Known Allergies    Objective     Temp:  [98 7 °F (37 1 °C)-100 1 °F (37 8 °C)] 99 6 °F (37 6 °C)  HR:  [76-88] 80  Resp:  [18] 18  BP: (119-143)/(71-80) 137/80    Physical Exam   Constitutional: She is oriented to person, place, and time  She appears well-developed and well-nourished  HENT:   Head: Normocephalic and atraumatic  Eyes: Pupils are equal, round, and reactive to light  EOM are normal    Neck: Normal range of motion  Neck supple  Cardiovascular: Intact distal pulses  Pulmonary/Chest: No accessory muscle usage  No respiratory distress  She has decreased breath sounds  Regular NC   Abdominal: Soft  Bowel sounds are normal    Neurological: She is alert and oriented to person, place, and time  Skin: Skin is warm  Capillary refill takes less than 2 seconds  Nursing note and vitals reviewed  Lab Results:   Results from last 7 days   Lab Units 11/27/19  0510   WBC Thousand/uL 9 81   HEMOGLOBIN g/dL 10 0*   HEMATOCRIT % 31 2*   PLATELETS Thousands/uL 190      Results from last 7 days   Lab Units 11/27/19  0510  11/23/19  1632   POTASSIUM mmol/L 3 3*   < >  --    CHLORIDE mmol/L 105   < >  --    CO2 mmol/L 28   < >  --    CO2, I-STAT mmol/L  --   --  25   BUN mg/dL 7   < >  --    CREATININE mg/dL 0 51*   < >  --    CALCIUM mg/dL 8 2*   < >  --    GLUCOSE, ISTAT mg/dl  --   --  130    < > = values in this interval not displayed  Imaging Studies: I have personally reviewed pertinent reports  EKG, Pathology, and Other Studies: I have personally reviewed pertinent reports  Counseling / Coordination of Care  Total floor / unit time spent today 20 minutes   Greater than 50% of total time was spent with the patient and / or family counseling and / or coordination of care   A description of the counseling / coordination of care: discussion of epidural anesthesia     Jacque Last DO`  Acute Pain Service

## 2019-11-27 NOTE — PROGRESS NOTES
Progress Note - Froylan Gutierres 1950, 81 y o  female MRN: [id number]    Unit/Bed#: [facility] 125-20 Encounter: [id number]    Primary Care Provider: Ronny Anderson MD   Date and time admitted to hospital: 11/23/2019  4:25 PM        Hypokalemia  Assessment & Plan  K is 3 3 -repleated     Right foot pain  Assessment & Plan  - Xray neg for fracture    Acute pain due to trauma  Assessment & Plan  ? HBG 10, trend    Acute respiratory failure with hypoxia (HCC)  Assessment & Plan  Secondary to rib fractures in poor inspiratory effort  With aid of epidural, patient now consistently greater than 500 on sound spirometer  Less pain with epidural in place  Continue  IS, encouraging  Coughing and deep breathing  Airway clearance protocol  HFNC weaned to 6L NC  Continue to wean    Fall down stairs  Assessment & Plan  PT/OT - home with family support    Multiple fractures of ribs, left side, initial encounter for closed fracture  Assessment & Plan  Pain control, IS, Rib fracture protocol  Epidural in place by APS, will keep until 11/29/19 per APS      * Anterior shoulder dislocation, right, initial encounter  Assessment & Plan  Ortho following, nonweightbearing in sling            Disposition: Maintain level of care until epidural is d/c'd      SUBJECTIVE:  Chief Complaint: Feels better with epidural    Subjective: Pt has continued pain and is concerned for the duration of healing needed for her fractures      OBJECTIVE:     Meds/Allergies     Current Facility-Administered Medications:     acetaminophen (TYLENOL) tablet 975 mg, 975 mg, Oral, Q8H [address], Sherine Sack, PA-C, 975 mg at 11/27/19 0603    bisacodyl (DULCOLAX) rectal suppository 10 mg, 10 mg, Rectal, Daily PRN, Sherine Sack, PA-C    docusate sodium (COLACE) capsule 100 mg, 100 mg, Oral, BID, Sherine Sack, PA-C, 100 mg at 11/27/19 1688    gabapentin (NEURONTIN) capsule 300 mg, 300 mg, Oral, HS, Sherine Sack, PA-C, 300 mg at 11/26/19 2124    heparin (porcine) subcutaneous injection 5,000 Units, 5,000 Units, Subcutaneous, Q12H [facility], Sherine Harrison PA-C, 5,000 Units at 11/27/19 0807    HYDROmorphone (DILAUDID) injection 0 5 mg, 0 5 mg, Intravenous, Q3H PRN, Sherine Harrison PA-C    lidocaine (LIDODERM) 5 % patch 1 patch, 1 patch, Topical, Daily, Sherine Harrison PA-C, 1 patch at 11/27/19 0807    methocarbamol (ROBAXIN) tablet 500 mg, 500 mg, Oral, Q6H [facility], YUVAL Singh-C, 500 mg at 11/27/19 0603    ondansetron (ZOFRAN) injection 4 mg, 4 mg, Intravenous, Q6H PRN, Sherine Harrison PA-C, 4 mg at 11/25/19 1604    oxyCODONE (ROXICODONE) IR tablet 2 5 mg, 2 5 mg, Oral, Q4H PRN, Sherine Harrison PA-C    oxyCODONE (ROXICODONE) IR tablet 5 mg, 5 mg, Oral, Q4H PRN, YUVAL Singh-C, 5 mg at 11/26/19 1747    potassium chloride 20 mEq IVPB (premix), 20 mEq, Intravenous, Q2H, [place] MD, Last Rate: 50 mL/hr at 11/27/19 0807, 20 mEq at 11/27/19 3484    predniSONE tablet 10 mg, 10 mg, Oral, Daily, YUVAL Singh-C, 10 mg at 11/27/19 4964    ropivacaine 0 1% and fentaNYL 2 mcg/mL PCEA, , Epidural, Continuous, YUVAL Singh-C    [facility]) tablet 8 6 mg, 1 tablet, Oral, HS, YUVAL Singh-DIONE, 8 6 mg at 11/26/19 2124    sertraline (ZOLOFT) tablet 100 mg, 100 mg, Oral, Daily, YUVAL Singh-C, 100 mg at 11/27/19 4546     Vitals:   Vitals:    11/27/19 0929   BP:    Pulse:    Resp:    Temp:    SpO2: 96%       Intake/Output:  I/O       11/25 0701 - 11/26 0700 11/26 0701 - 11/27 0700 11/27 0701 - 11/28 0700    P  O  118 580     I V  (mL/kg) 1503 3 (18 7) 347 1 (4 3) 78 2 (1)    Total Intake(mL/kg) 1621 3 (20 2) 927 1 (11 6) 78 2 (1)    Urine (mL/kg/hr) 1475 (0 8) 1850 (1)     Total Output 1475 1850     Net +146 3 -922 9 +78 2           Unmeasured Urine Occurrence  1 x 1 x           Nutrition/GI Proph/Bowel Reg: Regular, colace    Physical Exam:   General: VSS, NAD, awake, alert  Well-nourished, well-developed  Speaking normally in full sentences     Head: Normocephalic, atraumatic, nontender  Eyes:EOM-I  No hyphema  No subconjunctival hemorrhages  Symmetrical lids  ENT: Atraumatic external nose and ears  Neck: Symmetric, trachea midline  No JVD  CV: RRR  +S1/S2  No murmurs or gallops  Peripheral pulses +2 throughout  No chest wall tenderness  Lungs:   Unlabored No retractions  CTAB, lungs sounds equal bilateral  On 6L NC, SpO2 95%  No tachypnea  Abd: +BS, soft, NT/ND    MSK:   Rt shoulder in sling,  strength 5/5 b/l   Back:   No rashes  Skin: Dry, intact  Neuro: AAOx3, GCS 15, CN II-XII grossly intact  Motor grossly intact  Psychiatric/Behavioral: Appropriate mood and affect   Exam: deferred      Invasive Devices     Peripheral Intravenous Line            Peripheral IV 11/23/19 Left Antecubital 3 days    Peripheral IV 11/24/19 Left Forearm 2 days          Epidural Line            Epidural Catheter 11/25/19 1 day                 Lab Results: Results: I have personally reviewed pertinent reports  Imaging/EKG Studies: Results: I have personally reviewed pertinent reports      Other Studies:    VTE Prophylaxis: Sequential compression device (Venodyne)  and Heparin

## 2019-11-27 NOTE — ASSESSMENT & PLAN NOTE
Secondary to rib fractures in poor inspiratory effort  With aid of epidural, patient now consistently greater than 500 on sound spirometer  Less pain with epidural in place  Continue  IS, encouraging  Coughing and deep breathing  Airway clearance protocol  HFNC weaned to 6L NC   Continue to wean

## 2019-11-27 NOTE — ASSESSMENT & PLAN NOTE
Pain control, IS, Rib fracture protocol     Epidural in place by APS, will keep until 11/29/19 per APS

## 2019-11-27 NOTE — PROGRESS NOTES
ISAR Screening Tool    1  Before the illness or injury that brought you to the Emergency, did you need someone to help you on a regular basis? 0=No   2  Since the illness or injury that brought you to the Emergency, have you needed more help than usual to take care of yourself? 1=Yes   3  Have you been hospitalized for one or more nights during the past 6 months (excluding a stay in the Emergency Department)? 0=No   4  In general, do you see well? 1=No   5  In general, do you have serious problems with your memory? 0=No   6  Do you take more than three different medications everyday?  0=No   TOTAL   1     Did you order a geriatric consult if the score was 2 or greater?: no

## 2019-11-27 NOTE — PLAN OF CARE
Problem: Prexisting or High Potential for Compromised Skin Integrity  Goal: Skin integrity is maintained or improved  Description  INTERVENTIONS:  - Identify patients at risk for skin breakdown  - Assess and monitor skin integrity  - Assess and monitor nutrition and hydration status  - Monitor labs   - Assess for incontinence   - Turn and reposition patient  - Assist with mobility/ambulation  - Relieve pressure over bony prominences  - Avoid friction and shearing  - Provide appropriate hygiene as needed including keeping skin clean and dry  - Evaluate need for skin moisturizer/barrier cream  - Collaborate with interdisciplinary team   - Patient/family teaching  - Consider wound care consult   Outcome: Progressing     Problem: Potential for Falls  Goal: Patient will remain free of falls  Description  INTERVENTIONS:  - Assess patient frequently for physical needs  -  Identify cognitive and physical deficits and behaviors that affect risk of falls  -  [place] fall precautions as indicated by assessment   - Educate patient/family on patient safety including physical limitations  - Instruct patient to call for assistance with activity based on assessment  - Modify environment to reduce risk of injury  - Consider OT/PT consult to assist with strengthening/mobility  Outcome: Progressing     Problem: NEUROSENSORY - ADULT  Goal: Achieves maximal functionality and self care  Description  INTERVENTIONS  - Monitor swallowing and airway patency with patient fatigue and changes in neurological status  - Encourage and assist patient to increase activity and self care     - Encourage visually impaired, hearing impaired and aphasic patients to use assistive/communication devices  Outcome: Progressing     Problem: CARDIOVASCULAR - ADULT  Goal: Maintains optimal cardiac output and hemodynamic stability  Description  INTERVENTIONS:  - Monitor I/O, vital signs and rhythm  - Monitor for S/S and trends of decreased cardiac output  - Administer and titrate ordered vasoactive medications to optimize hemodynamic stability  - Assess quality of pulses, skin color and temperature  - Assess for signs of decreased coronary artery perfusion  - Instruct patient to report change in severity of symptoms  Outcome: Progressing     Problem: RESPIRATORY - ADULT  Goal: Achieves optimal ventilation and oxygenation  Description  INTERVENTIONS:  - Assess for changes in respiratory status  - Assess for changes in mentation and behavior  - Position to facilitate oxygenation and minimize respiratory effort  - Oxygen administered by appropriate delivery if ordered  - Initiate smoking cessation education as indicated  - Encourage broncho-pulmonary hygiene including cough, deep breathe, Incentive Spirometry  - Assess the need for suctioning and aspirate as needed  - Assess and instruct to report SOB or any respiratory difficulty  - Respiratory Therapy support as indicated  Outcome: Progressing     Problem: GENITOURINARY - ADULT  Goal: Maintains or returns to baseline urinary function  Description  INTERVENTIONS:  - Assess urinary function  - Encourage oral fluids to ensure adequate hydration if ordered  - Administer IV fluids as ordered to ensure adequate hydration  - Administer ordered medications as needed  - Offer frequent toileting  - Follow urinary retention protocol if ordered  Outcome: Progressing     Problem: METABOLIC, FLUID AND ELECTROLYTES - ADULT  Goal: Electrolytes maintained within normal limits  Description  INTERVENTIONS:  - Monitor labs and assess patient for signs and symptoms of electrolyte imbalances  - Administer electrolyte replacement as ordered  - Monitor response to electrolyte replacements, including repeat lab results as appropriate  - Instruct patient on fluid and nutrition as appropriate  Outcome: Progressing  Goal: Fluid balance maintained  Description  INTERVENTIONS:  - Monitor labs   - Monitor I/O and WT  - Instruct patient on fluid and nutrition as appropriate  - Assess for signs & symptoms of volume excess or deficit  Outcome: Progressing     Problem: SKIN/TISSUE INTEGRITY - ADULT  Goal: Skin integrity remains intact  Description  INTERVENTIONS  - Identify patients at risk for skin breakdown  - Assess and monitor skin integrity  - Assess and monitor nutrition and hydration status  - Monitor labs (i e  albumin)  - Assess for incontinence   - Turn and reposition patient  - Assist with mobility/ambulation  - Relieve pressure over bony prominences  - Avoid friction and shearing  - Provide appropriate hygiene as needed including keeping skin clean and dry  - Evaluate need for skin moisturizer/barrier cream  - Collaborate with interdisciplinary team (i e  Nutrition, Rehabilitation, etc )   - Patient/family teaching  Outcome: Progressing     Problem: HEMATOLOGIC - ADULT  Goal: Maintains hematologic stability  Description  INTERVENTIONS  - Assess for signs and symptoms of bleeding or hemorrhage  - Monitor labs  - Administer supportive blood products/factors as ordered and appropriate  Outcome: Progressing     Problem: MUSCULOSKELETAL - ADULT  Goal: Maintain or return mobility to safest level of function  Description  INTERVENTIONS:  - Assess patient's ability to carry out ADLs; assess patient's baseline for ADL function and identify physical deficits which impact ability to perform ADLs (bathing, care of mouth/teeth, toileting, grooming, dressing, etc )  - Assess/evaluate cause of self-care deficits   - Assess range of motion  - Assess patient's mobility  - Assess patient's need for assistive devices and provide as appropriate  - Encourage maximum independence but intervene and supervise when necessary  - Involve family in performance of ADLs  - Assess for home care needs following discharge   - Consider OT consult to assist with ADL evaluation and planning for discharge  - Provide patient education as appropriate  Outcome: Progressing     Problem: Nutrition/Hydration-ADULT  Goal: Nutrient/Hydration intake appropriate for improving, restoring or maintaining nutritional needs  Description  Monitor and assess patient's nutrition/hydration status for malnutrition  Collaborate with interdisciplinary team and initiate plan and interventions as ordered  Monitor patient's weight and dietary intake as ordered or per policy  Utilize nutrition screening tool and intervene as necessary  Determine patient's food preferences and provide high-protein, high-caloric foods as appropriate       INTERVENTIONS:  - Monitor oral intake, urinary output, labs, and treatment plans  - Assess nutrition and hydration status and recommend course of action  - Evaluate amount of meals eaten  - Assist patient with eating if necessary   - Allow adequate time for meals  - Recommend/ encourage appropriate diets, oral nutritional supplements, and vitamin/mineral supplements  - Order, calculate, and assess calorie counts as needed  - Recommend, monitor, and adjust tube feedings and TPN/PPN based on assessed needs  - Assess need for intravenous fluids  - Provide specific nutrition/hydration education as appropriate  - Include patient/family/caregiver in decisions related to nutrition  Outcome: Progressing     Problem: PAIN - ADULT  Goal: Verbalizes/displays adequate comfort level or baseline comfort level  Description  Interventions:  - Encourage patient to monitor pain and request assistance  - Assess pain using appropriate pain scale  - Administer analgesics based on type and severity of pain and evaluate response  - Implement non-pharmacological measures as appropriate and evaluate response  - Consider cultural and social influences on pain and pain management  - Notify physician/advanced practitioner if interventions unsuccessful or patient reports new pain  Outcome: Progressing     Problem: INFECTION - ADULT  Goal: Absence or prevention of progression during hospitalization  Description  INTERVENTIONS:  - Assess and monitor for signs and symptoms of infection  - Monitor lab/diagnostic results  - Monitor all insertion sites, i e  indwelling lines, tubes, and drains  - Monitor endotracheal if appropriate and nasal secretions for changes in amount and color  - [place] appropriate cooling/warming therapies per order  - Administer medications as ordered  - Instruct and encourage patient and family to use good hand hygiene technique  - Identify and instruct in appropriate isolation precautions for identified infection/condition  Outcome: Progressing  Goal: Absence of fever/infection during neutropenic period  Description  INTERVENTIONS:  - Monitor WBC    Outcome: Progressing     Problem: SAFETY ADULT  Goal: Maintain or return to baseline ADL function  Description  INTERVENTIONS:  -  Assess patient's ability to carry out ADLs; assess patient's baseline for ADL function and identify physical deficits which impact ability to perform ADLs (bathing, care of mouth/teeth, toileting, grooming, dressing, etc )  - Assess/evaluate cause of self-care deficits   - Assess range of motion  - Assess patient's mobility; develop plan if impaired  - Assess patient's need for assistive devices and provide as appropriate  - Encourage maximum independence but intervene and supervise when necessary  - Involve family in performance of ADLs  - Assess for home care needs following discharge   - Consider OT consult to assist with ADL evaluation and planning for discharge  - Provide patient education as appropriate  Outcome: Progressing  Goal: Maintain or return mobility status to optimal level  Description  INTERVENTIONS:  - Assess patient's baseline mobility status (ambulation, transfers, stairs, etc )    - Identify cognitive and physical deficits and behaviors that affect mobility  - Identify mobility aids required to assist with transfers and/or ambulation (gait belt, sit-to-stand, lift, walker, cane, etc )  - [place] fall precautions as indicated by assessment  - Record patient progress and toleration of activity level on Mobility SBAR; progress patient to next Phase/Stage  - Instruct patient to call for assistance with activity based on assessment  - Consider rehabilitation consult to assist with strengthening/weightbearing, etc   Outcome: Progressing     Problem: DISCHARGE PLANNING  Goal: Discharge to home or other facility with appropriate resources  Description  INTERVENTIONS:  - Identify barriers to discharge w/patient and caregiver  - Arrange for needed discharge resources and transportation as appropriate  - Identify discharge learning needs (meds, wound care, etc )  - Arrange for interpretive services to assist at discharge as needed  - Refer to Case Management Department for coordinating discharge planning if the patient needs post-hospital services based on physician/advanced practitioner order or complex needs related to functional status, cognitive ability, or social support system  Outcome: Progressing     Problem: Knowledge Deficit  Goal: Patient/family/caregiver demonstrates understanding of disease process, treatment plan, medications, and discharge instructions  Description  Complete learning assessment and assess knowledge base    Interventions:  - Provide teaching at level of understanding  - Provide teaching via preferred learning methods  Outcome: Progressing     Problem: COPING  Goal: Pt/Family able to verbalize concerns and demonstrate effective coping strategies  Description  INTERVENTIONS:  - Assist patient/family to identify coping skills, available support systems and cultural and spiritual values  - Provide emotional support, including active listening and acknowledgement of concerns of patient and caregivers  - Reduce environmental stimuli, as able  - Provide patient education  - Assess for spiritual pain/suffering and initiate spiritual care, including notification of Pastoral Care or matt based community as needed  - Assess effectiveness of coping strategies  Outcome: Progressing  Goal: Will report anxiety at manageable levels  Description  INTERVENTIONS:  - Administer medication as ordered  - Teach and encourage coping skills  - Provide emotional support  - Assess patient/family for anxiety and ability to cope  Outcome: Progressing

## 2019-11-27 NOTE — PLAN OF CARE
Problem: PHYSICAL THERAPY ADULT  Goal: Performs mobility at highest level of function for planned discharge setting  See evaluation for individualized goals  Description  Treatment/Interventions: Functional transfer training, LE strengthening/ROM, Therapeutic exercise, Endurance training, Equipment eval/education, Bed mobility, Gait training, Spoke to nursing, OT, Family  Equipment Recommended: (Continue to assess as appropriate)       See flowsheet documentation for full assessment, interventions and recommendations  Outcome: Progressing  Note:   Prognosis: Good  Problem List: Decreased strength, Decreased endurance, Impaired balance, Decreased mobility, Decreased coordination, Decreased safety awareness, Pain  Assessment: Pt demonstrated improved mobiilty this session  Ambulated increased distances, while reducing need for supplemental O2 compared to last session  Pt performed transfers with min A to assist with scooting to EOB before standing, then with slight assist to ensure safety upon standing & sitting  Pt demonstrated slow pacing with shuffling gait, and did so safely with use of NBQC  Did demonstrate 1 minor LOB before requiring seated rest, but was able to recover with use of NBQC & slightly increased assist   No LOB noted in 2nd gait trial   Will continue to benefit from therapy services to improve ambulation distances, balance, and reduce need for supplemental O2 & assist to maximize independence & ensure safe return home when appropriate  Barriers to Discharge: Inaccessible home environment  Barriers to Discharge Comments: increased ambulation, FILIPE  Recommendation: Home PT, Home with family support(pending increased ambulation, stair trial)     PT - OK to Discharge: (S) No(pending further ambulation, stair trial)    See flowsheet documentation for full assessment

## 2019-11-27 NOTE — PHYSICAL THERAPY NOTE
Physical Therapy Progress Note     11/27/19 1045   Pain Assessment   Pain Assessment No/denies pain   Restrictions/Precautions   RUE Weight Bearing Per Order NWB   Braces or Orthoses Splint;Sling   Other Precautions WBS;Pain; Fall Risk;O2;Multiple lines  (PCA pump, IV)   Subjective   Subjective Pt encountered supine in bed, pleasant and agreeable to treatment  Reports no pain when prompted & offers no other complaints during session  Pt & daughter initially anxious about mobility, but both felt happier afterwards  Bed Mobility   Supine to Sit 4  Minimal assistance   Additional items Assist x 1  (to scoot to EOB)   Transfers   Sit to Stand 4  Minimal assistance   Additional items Assist x 1; Increased time required   Stand to Sit 4  Minimal assistance   Additional items Assist x 1; Armrests; Increased time required   Ambulation/Elevation   Gait pattern Excessively slow; Short stride; Inconsistent ester;Decreased foot clearance; Improper Weight shift;Narrow NADYA;Shuffling   Gait Assistance 4  Minimal assist   Additional items Assist x 1  (+ additional assist x2 for lines & chair follow)   Assistive Device Small base quad cane   Distance 110' x 2   Balance   Static Sitting Fair +   Static Standing Fair -   Ambulatory Poor +   Endurance Deficit   Endurance Deficit Yes   Endurance Deficit Description fatigue, impaired balance, slight SOB on 6L O2 nc SpO2 ~95% at rest, dropping to ~91% while ambulating   Activity Tolerance   Activity Tolerance Patient tolerated treatment well;Patient limited by fatigue   Nurse Made Aware yes   Assessment   Prognosis Good   Problem List Decreased strength;Decreased endurance; Impaired balance;Decreased mobility; Decreased coordination;Decreased safety awareness;Pain   Assessment Pt demonstrated improved mobiilty this session  Ambulated increased distances, while reducing need for supplemental O2 compared to last session    Pt performed transfers with min A to assist with scooting to EOB before standing, then with slight assist to ensure safety upon standing & sitting  Pt demonstrated slow pacing with shuffling gait, and did so safely with use of NBQC  Did demonstrate 1 minor LOB before requiring seated rest, but was able to recover with use of NBQC & slightly increased assist   No LOB noted in 2nd gait trial   Will continue to benefit from therapy services to improve ambulation distances, balance, and reduce need for supplemental O2 & assist to maximize independence & ensure safe return home when appropriate  Barriers to Discharge Inaccessible home environment   Barriers to Discharge Comments increased ambulation, FILIPE   Goals   Patient Goals to rest & feel better   STG Expiration Date 12/09/19   PT Treatment Day 1   Plan   Treatment/Interventions Functional transfer training;LE strengthening/ROM; Elevations; Endurance training; Therapeutic exercise;Patient/family training;Equipment eval/education; Bed mobility;Gait training   Progress Progressing toward goals   PT Frequency   (3-6x/week)   Recommendation   Recommendation Home PT; Home with family support  (pending increased ambulation, stair trial)     Roxanne Llamas, PTA

## 2019-11-28 PROCEDURE — 94668 MNPJ CHEST WALL SBSQ: CPT

## 2019-11-28 PROCEDURE — 97530 THERAPEUTIC ACTIVITIES: CPT

## 2019-11-28 PROCEDURE — 97116 GAIT TRAINING THERAPY: CPT

## 2019-11-28 PROCEDURE — 97535 SELF CARE MNGMENT TRAINING: CPT | Performed by: STUDENT IN AN ORGANIZED HEALTH CARE EDUCATION/TRAINING PROGRAM

## 2019-11-28 PROCEDURE — 99231 SBSQ HOSP IP/OBS SF/LOW 25: CPT | Performed by: ANESTHESIOLOGY

## 2019-11-28 PROCEDURE — 99232 SBSQ HOSP IP/OBS MODERATE 35: CPT | Performed by: SURGERY

## 2019-11-28 PROCEDURE — 97530 THERAPEUTIC ACTIVITIES: CPT | Performed by: STUDENT IN AN ORGANIZED HEALTH CARE EDUCATION/TRAINING PROGRAM

## 2019-11-28 PROCEDURE — 94760 N-INVAS EAR/PLS OXIMETRY 1: CPT

## 2019-11-28 RX ADMIN — METHOCARBAMOL TABLETS 500 MG: 500 TABLET, COATED ORAL at 05:37

## 2019-11-28 RX ADMIN — ACETAMINOPHEN 975 MG: 325 TABLET ORAL at 13:08

## 2019-11-28 RX ADMIN — ACETAMINOPHEN 975 MG: 325 TABLET ORAL at 21:53

## 2019-11-28 RX ADMIN — METHOCARBAMOL TABLETS 500 MG: 500 TABLET, COATED ORAL at 13:08

## 2019-11-28 RX ADMIN — BISACODYL 10 MG: 10 SUPPOSITORY RECTAL at 20:28

## 2019-11-28 RX ADMIN — ONDANSETRON 4 MG: 2 INJECTION INTRAMUSCULAR; INTRAVENOUS at 10:22

## 2019-11-28 RX ADMIN — HEPARIN SODIUM 5000 UNITS: 5000 INJECTION INTRAVENOUS; SUBCUTANEOUS at 08:49

## 2019-11-28 RX ADMIN — METHOCARBAMOL TABLETS 500 MG: 500 TABLET, COATED ORAL at 17:22

## 2019-11-28 RX ADMIN — HEPARIN SODIUM 5000 UNITS: 5000 INJECTION INTRAVENOUS; SUBCUTANEOUS at 21:51

## 2019-11-28 RX ADMIN — PREDNISONE 10 MG: 10 TABLET ORAL at 08:49

## 2019-11-28 RX ADMIN — SENNOSIDES 8.6 MG: 8.6 TABLET, FILM COATED ORAL at 21:53

## 2019-11-28 RX ADMIN — SERTRALINE HYDROCHLORIDE 100 MG: 100 TABLET ORAL at 08:49

## 2019-11-28 RX ADMIN — OXYCODONE HYDROCHLORIDE 5 MG: 5 TABLET ORAL at 21:53

## 2019-11-28 RX ADMIN — GABAPENTIN 300 MG: 300 CAPSULE ORAL at 21:51

## 2019-11-28 RX ADMIN — ACETAMINOPHEN 975 MG: 325 TABLET ORAL at 05:37

## 2019-11-28 RX ADMIN — ROPIVACAINE HYDROCHLORIDE: 5 INJECTION, SOLUTION EPIDURAL; INFILTRATION; PERINEURAL at 14:47

## 2019-11-28 RX ADMIN — LIDOCAINE 1 PATCH: 50 PATCH CUTANEOUS at 08:49

## 2019-11-28 NOTE — ASSESSMENT & PLAN NOTE
Pain control improved with epidural, IS, Rib fracture protocol     Epidural per APS, will keep until 11/29/19   this AM

## 2019-11-28 NOTE — PROGRESS NOTES
Anesthesia Progress Note - [facility]    Tanna Kat MRN: [id number]  Unit/Bed#: [facility] 727-27 Encounter: [id number]        Epidural is runnin 1% Ropivacaine plus 2 ug/cc fentanyl at 10/4/10  Patient using PCEA effectively  Not needing any other prn meds      Plan:   Continue same      Assessment:   81 y o  female status post Placement Thoracic Epidural PPD# 3  Epidural Site:C/D/I  Neurological assessment : Patient neurologically intact    Subjective:  Current pain location(s): left ribs  Pain Scale:   5/10      Meds/Allergies   current meds:   Current Facility-Administered Medications   Medication Dose Route Frequency    acetaminophen (TYLENOL) tablet 975 mg  975 mg Oral Q8H [address]    bisacodyl (DULCOLAX) rectal suppository 10 mg  10 mg Rectal Daily PRN    docusate sodium (COLACE) capsule 100 mg  100 mg Oral BID    gabapentin (NEURONTIN) capsule 300 mg  300 mg Oral HS    heparin (porcine) subcutaneous injection 5,000 Units  5,000 Units Subcutaneous Q12H [address]    HYDROmorphone (DILAUDID) injection 0 5 mg  0 5 mg Intravenous Q3H PRN    lidocaine (LIDODERM) 5 % patch 1 patch  1 patch Topical Daily    methocarbamol (ROBAXIN) tablet 500 mg  500 mg Oral Q6H [address]    ondansetron (ZOFRAN) injection 4 mg  4 mg Intravenous Q6H PRN    oxyCODONE (ROXICODONE) IR tablet 2 5 mg  2 5 mg Oral Q4H PRN    oxyCODONE (ROXICODONE) IR tablet 5 mg  5 mg Oral Q4H PRN    polyethylene glycol (MIRALAX) packet 17 g  17 g Oral Daily    predniSONE tablet 10 mg  10 mg Oral Daily    ropivacaine 0 1% and fentaNYL 2 mcg/mL PCEA   Epidural Continuous    senna (SENOKOT) tablet 8 6 mg  1 tablet Oral HS    sertraline (ZOLOFT) tablet 100 mg  100 mg Oral Daily       No Known Allergies    Objective     Temp:  [98 5 °F (36 9 °C)-100 5 °F (38 1 °C)] 98 5 °F (36 9 °C)  HR:  [79-86] 85  Resp:  [16-20] 18  BP: (132-139)/(66-84) 136/77    SIGNATURE: Chaka Forman MD  DATE: 2019  TIME: 1:44 PM    Please call  ( [facility] [phone]) with any questions

## 2019-11-28 NOTE — PLAN OF CARE
Problem: Prexisting or High Potential for Compromised Skin Integrity  Goal: Skin integrity is maintained or improved  Description  INTERVENTIONS:  - Identify patients at risk for skin breakdown  - Assess and monitor skin integrity  - Assess and monitor nutrition and hydration status  - Monitor labs   - Assess for incontinence   - Turn and reposition patient  - Assist with mobility/ambulation  - Relieve pressure over bony prominences  - Avoid friction and shearing  - Provide appropriate hygiene as needed including keeping skin clean and dry  - Evaluate need for skin moisturizer/barrier cream  - Collaborate with interdisciplinary team   - Patient/family teaching  - Consider wound care consult   Outcome: Progressing     Problem: Potential for Falls  Goal: Patient will remain free of falls  Description  INTERVENTIONS:  - Assess patient frequently for physical needs  -  Identify cognitive and physical deficits and behaviors that affect risk of falls  -  [place] fall precautions as indicated by assessment   - Educate patient/family on patient safety including physical limitations  - Instruct patient to call for assistance with activity based on assessment  - Modify environment to reduce risk of injury  - Consider OT/PT consult to assist with strengthening/mobility  Outcome: Progressing     Problem: NEUROSENSORY - ADULT  Goal: Achieves maximal functionality and self care  Description  INTERVENTIONS  - Monitor swallowing and airway patency with patient fatigue and changes in neurological status  - Encourage and assist patient to increase activity and self care     - Encourage visually impaired, hearing impaired and aphasic patients to use assistive/communication devices  Outcome: Progressing     Problem: CARDIOVASCULAR - ADULT  Goal: Maintains optimal cardiac output and hemodynamic stability  Description  INTERVENTIONS:  - Monitor I/O, vital signs and rhythm  - Monitor for S/S and trends of decreased cardiac output  - Administer and titrate ordered vasoactive medications to optimize hemodynamic stability  - Assess quality of pulses, skin color and temperature  - Assess for signs of decreased coronary artery perfusion  - Instruct patient to report change in severity of symptoms  Outcome: Progressing     Problem: RESPIRATORY - ADULT  Goal: Achieves optimal ventilation and oxygenation  Description  INTERVENTIONS:  - Assess for changes in respiratory status  - Assess for changes in mentation and behavior  - Position to facilitate oxygenation and minimize respiratory effort  - Oxygen administered by appropriate delivery if ordered  - Initiate smoking cessation education as indicated  - Encourage broncho-pulmonary hygiene including cough, deep breathe, Incentive Spirometry  - Assess the need for suctioning and aspirate as needed  - Assess and instruct to report SOB or any respiratory difficulty  - Respiratory Therapy support as indicated  Outcome: Progressing     Problem: GENITOURINARY - ADULT  Goal: Maintains or returns to baseline urinary function  Description  INTERVENTIONS:  - Assess urinary function  - Encourage oral fluids to ensure adequate hydration if ordered  - Administer IV fluids as ordered to ensure adequate hydration  - Administer ordered medications as needed  - Offer frequent toileting  - Follow urinary retention protocol if ordered  Outcome: Progressing     Problem: METABOLIC, FLUID AND ELECTROLYTES - ADULT  Goal: Electrolytes maintained within normal limits  Description  INTERVENTIONS:  - Monitor labs and assess patient for signs and symptoms of electrolyte imbalances  - Administer electrolyte replacement as ordered  - Monitor response to electrolyte replacements, including repeat lab results as appropriate  - Instruct patient on fluid and nutrition as appropriate  Outcome: Progressing  Goal: Fluid balance maintained  Description  INTERVENTIONS:  - Monitor labs   - Monitor I/O and WT  - Instruct patient on fluid and nutrition as appropriate  - Assess for signs & symptoms of volume excess or deficit  Outcome: Progressing     Problem: SKIN/TISSUE INTEGRITY - ADULT  Goal: Skin integrity remains intact  Description  INTERVENTIONS  - Identify patients at risk for skin breakdown  - Assess and monitor skin integrity  - Assess and monitor nutrition and hydration status  - Monitor labs (i e  albumin)  - Assess for incontinence   - Turn and reposition patient  - Assist with mobility/ambulation  - Relieve pressure over bony prominences  - Avoid friction and shearing  - Provide appropriate hygiene as needed including keeping skin clean and dry  - Evaluate need for skin moisturizer/barrier cream  - Collaborate with interdisciplinary team (i e  Nutrition, Rehabilitation, etc )   - Patient/family teaching  Outcome: Progressing     Problem: HEMATOLOGIC - ADULT  Goal: Maintains hematologic stability  Description  INTERVENTIONS  - Assess for signs and symptoms of bleeding or hemorrhage  - Monitor labs  - Administer supportive blood products/factors as ordered and appropriate  Outcome: Progressing     Problem: MUSCULOSKELETAL - ADULT  Goal: Maintain or return mobility to safest level of function  Description  INTERVENTIONS:  - Assess patient's ability to carry out ADLs; assess patient's baseline for ADL function and identify physical deficits which impact ability to perform ADLs (bathing, care of mouth/teeth, toileting, grooming, dressing, etc )  - Assess/evaluate cause of self-care deficits   - Assess range of motion  - Assess patient's mobility  - Assess patient's need for assistive devices and provide as appropriate  - Encourage maximum independence but intervene and supervise when necessary  - Involve family in performance of ADLs  - Assess for home care needs following discharge   - Consider OT consult to assist with ADL evaluation and planning for discharge  - Provide patient education as appropriate  Outcome: Progressing     Problem: Nutrition/Hydration-ADULT  Goal: Nutrient/Hydration intake appropriate for improving, restoring or maintaining nutritional needs  Description  Monitor and assess patient's nutrition/hydration status for malnutrition  Collaborate with interdisciplinary team and initiate plan and interventions as ordered  Monitor patient's weight and dietary intake as ordered or per policy  Utilize nutrition screening tool and intervene as necessary  Determine patient's food preferences and provide high-protein, high-caloric foods as appropriate       INTERVENTIONS:  - Monitor oral intake, urinary output, labs, and treatment plans  - Assess nutrition and hydration status and recommend course of action  - Evaluate amount of meals eaten  - Assist patient with eating if necessary   - Allow adequate time for meals  - Recommend/ encourage appropriate diets, oral nutritional supplements, and vitamin/mineral supplements  - Order, calculate, and assess calorie counts as needed  - Recommend, monitor, and adjust tube feedings and TPN/PPN based on assessed needs  - Assess need for intravenous fluids  - Provide specific nutrition/hydration education as appropriate  - Include patient/family/caregiver in decisions related to nutrition  Outcome: Progressing     Problem: PAIN - ADULT  Goal: Verbalizes/displays adequate comfort level or baseline comfort level  Description  Interventions:  - Encourage patient to monitor pain and request assistance  - Assess pain using appropriate pain scale  - Administer analgesics based on type and severity of pain and evaluate response  - Implement non-pharmacological measures as appropriate and evaluate response  - Consider cultural and social influences on pain and pain management  - Notify physician/advanced practitioner if interventions unsuccessful or patient reports new pain  Outcome: Progressing     Problem: INFECTION - ADULT  Goal: Absence or prevention of progression during hospitalization  Description  INTERVENTIONS:  - Assess and monitor for signs and symptoms of infection  - Monitor lab/diagnostic results  - Monitor all insertion sites, i e  indwelling lines, tubes, and drains  - Monitor endotracheal if appropriate and nasal secretions for changes in amount and color  - Maribel appropriate cooling/warming therapies per order  - Administer medications as ordered  - Instruct and encourage patient and family to use good hand hygiene technique  - Identify and instruct in appropriate isolation precautions for identified infection/condition  Outcome: Progressing  Goal: Absence of fever/infection during neutropenic period  Description  INTERVENTIONS:  - Monitor WBC    Outcome: Progressing     Problem: SAFETY ADULT  Goal: Maintain or return to baseline ADL function  Description  INTERVENTIONS:  -  Assess patient's ability to carry out ADLs; assess patient's baseline for ADL function and identify physical deficits which impact ability to perform ADLs (bathing, care of mouth/teeth, toileting, grooming, dressing, etc )  - Assess/evaluate cause of self-care deficits   - Assess range of motion  - Assess patient's mobility; develop plan if impaired  - Assess patient's need for assistive devices and provide as appropriate  - Encourage maximum independence but intervene and supervise when necessary  - Involve family in performance of ADLs  - Assess for home care needs following discharge   - Consider OT consult to assist with ADL evaluation and planning for discharge  - Provide patient education as appropriate  Outcome: Progressing  Goal: Maintain or return mobility status to optimal level  Description  INTERVENTIONS:  - Assess patient's baseline mobility status (ambulation, transfers, stairs, etc )    - Identify cognitive and physical deficits and behaviors that affect mobility  - Identify mobility aids required to assist with transfers and/or ambulation (gait belt, sit-to-stand, lift, walker, cane, etc )  - [place] fall precautions as indicated by assessment  - Record patient progress and toleration of activity level on Mobility SBAR; progress patient to next Phase/Stage  - Instruct patient to call for assistance with activity based on assessment  - Consider rehabilitation consult to assist with strengthening/weightbearing, etc   Outcome: Progressing     Problem: DISCHARGE PLANNING  Goal: Discharge to home or other facility with appropriate resources  Description  INTERVENTIONS:  - Identify barriers to discharge w/patient and caregiver  - Arrange for needed discharge resources and transportation as appropriate  - Identify discharge learning needs (meds, wound care, etc )  - Arrange for interpretive services to assist at discharge as needed  - Refer to Case Management Department for coordinating discharge planning if the patient needs post-hospital services based on physician/advanced practitioner order or complex needs related to functional status, cognitive ability, or social support system  Outcome: Progressing     Problem: Knowledge Deficit  Goal: Patient/family/caregiver demonstrates understanding of disease process, treatment plan, medications, and discharge instructions  Description  Complete learning assessment and assess knowledge base    Interventions:  - Provide teaching at level of understanding  - Provide teaching via preferred learning methods  Outcome: Progressing     Problem: COPING  Goal: Pt/Family able to verbalize concerns and demonstrate effective coping strategies  Description  INTERVENTIONS:  - Assist patient/family to identify coping skills, available support systems and cultural and spiritual values  - Provide emotional support, including active listening and acknowledgement of concerns of patient and caregivers  - Reduce environmental stimuli, as able  - Provide patient education  - Assess for spiritual pain/suffering and initiate spiritual care, including notification of Pastoral Care or matt based community as needed  - Assess effectiveness of coping strategies  Outcome: Progressing  Goal: Will report anxiety at manageable levels  Description  INTERVENTIONS:  - Administer medication as ordered  - Teach and encourage coping skills  - Provide emotional support  - Assess patient/family for anxiety and ability to cope  Outcome: Progressing

## 2019-11-28 NOTE — PLAN OF CARE
Problem: PHYSICAL THERAPY ADULT  Goal: Performs mobility at highest level of function for planned discharge setting  See evaluation for individualized goals  Description  Treatment/Interventions: Functional transfer training, LE strengthening/ROM, Therapeutic exercise, Endurance training, Equipment eval/education, Bed mobility, Gait training, Spoke to nursing, OT, Family  Equipment Recommended: (Continue to assess as appropriate)       See flowsheet documentation for full assessment, interventions and recommendations  Outcome: Progressing  Note:   Prognosis: Good  Problem List: Decreased strength, Decreased endurance, Impaired balance, Decreased mobility, Decreased coordination, Decreased safety awareness, Pain  Assessment: Pt continues to make progress with mobility this session  Ambulated repeated household distances with less supplemental O2 this session, no LOB and no new complaints  pt able to perform all transfers without assist, but did require increased time due to presence of lines, NWB RUE and increased fatigue  Anticipate further progress in upcoming sessions, with goals to maximize ambulation distances with SPC vs NBQC and trial steps to ensure pt has access to home & community upon discharge  Barriers to Discharge: Inaccessible home environment  Barriers to Discharge Comments: improve ambulation & trial stairs  Recommendation: Home PT, Home with family support     PT - OK to Discharge: (S) No(pending further ambulation, stair trial)    See flowsheet documentation for full assessment

## 2019-11-28 NOTE — PHYSICAL THERAPY NOTE
Physical Therapy Progress Note     11/28/19 5891   Pain Assessment   Pain Assessment No/denies pain   Restrictions/Precautions   RUE Weight Bearing Per Order NWB   Braces or Orthoses Sling   Other Precautions Pain; Fall Risk;WBS;O2  (epidural)   Subjective   Subjective pt encountered supine in bed, reporting increased fatigue today, but agreeable to treatment after education regarding early mobility  Daughter present throughout session  Bed Mobility   Supine to Sit 5  Supervision   Additional items Assist x 1;HOB elevated; Increased time required   Transfers   Sit to Stand 5  Supervision   Additional items Assist x 1; Armrests; Increased time required   Stand to Sit 5  Supervision   Additional items Assist x 1; Armrests; Increased time required   Ambulation/Elevation   Gait pattern Excessively slow; Short stride; Inconsistent ester; Shuffling;Decreased foot clearance; Improper Weight shift   Gait Assistance 4  Minimal assist   Additional items Assist x 1  (+ chair follow and assist for line management)   Assistive Device Small base quad cane   Distance 90' x 2   Balance   Static Sitting Fair +   Static Standing Fair -   Ambulatory Poor +   Endurance Deficit   Endurance Deficit Yes   Endurance Deficit Description fatigue, pt on 4L O2 nc with SpO2 91-94% during session   Activity Tolerance   Activity Tolerance Patient tolerated treatment well;Patient limited by fatigue   Nurse Made Aware yes   Assessment   Prognosis Good   Problem List Decreased strength;Decreased endurance; Impaired balance;Decreased mobility; Decreased coordination;Decreased safety awareness;Pain   Assessment Pt continues to make progress with mobility this session  Ambulated repeated household distances with less supplemental O2 this session, no LOB and no new complaints  pt able to perform all transfers without assist, but did require increased time due to presence of lines, NWB RUE and increased fatigue    Anticipate further progress in upcoming sessions, with goals to maximize ambulation distances with SPC vs NBQC and trial steps to ensure pt has access to home & community upon discharge  Barriers to Discharge Inaccessible home environment   Barriers to Discharge Comments improve ambulation & trial stairs   Goals   Patient Goals to rest today   STG Expiration Date 12/09/19   PT Treatment Day 2   Plan   Treatment/Interventions Functional transfer training;LE strengthening/ROM; Elevations; Therapeutic exercise; Endurance training;Patient/family training;Bed mobility; Equipment eval/education;Gait training   Progress Progressing toward goals   PT Frequency   (3-6x/week)   Recommendation   Recommendation Home PT; Home with family support   Equipment Recommended Other (Comment)  (NBQC vs SPC pending progress)     Ansley Mcclellan, PTA

## 2019-11-28 NOTE — ASSESSMENT & PLAN NOTE
Secondary to rib fractures, pain with poor inspiratory effort  Continue  IS, encouraging coughing and deep breathing  Airway clearance protocol  Currently on NC   Continue to wean down as tolerated

## 2019-11-28 NOTE — OCCUPATIONAL THERAPY NOTE
11/28/19 1410   Restrictions/Precautions   Weight Bearing Precautions Per Order Yes   RUE Weight Bearing Per Order NWB   Braces or Orthoses Splint;Sling   Other Precautions WBS; Fall Risk;Pain;O2;Multiple lines  (PCA pump, IV)   Pain Assessment   Pain Assessment 0-10   Pain Score 3   ADL   Where Assessed Chair   LB Dressing Assistance 5  Supervision/Setup   LB Dressing Deficit Setup   LB Dressing Comments don/doff socks with 1 handed technique   Toileting Assistance  3  Moderate Assistance   Toileting Deficit Setup;Supervison/safety; Bedside commode;Perineal hygiene   Toileting Comments bladder management   Bed Mobility   Supine to Sit 4  Minimal assistance   Additional items Assist x 1   Transfers   Sit to Stand 4  Minimal assistance   Additional items Assist x 1   Stand to Sit 4  Minimal assistance   Additional items Assist x 1   Stand pivot 4  Minimal assistance   Additional items Assist x 1   Toilet transfer 4  Minimal assistance   Additional items Assist x 1   Cognition   Overall Cognitive Status [facility]   Arousal/Participation Cooperative   Attention Attends with cues to redirect   Orientation Level Oriented X4   Memory Within functional limits   Following Commands Follows one step commands with increased time or repetition   Activity Tolerance   Activity Tolerance Patient tolerated treatment well   Assessment   Assessment Pt participates in OT session with focus on LB dressing, toileting, transfers, bed mobility, and activity tolerance to increase I for d/c  Pt min A supine to sit EOB with HOB elevated and SR support  Pt min Ax1 sit to stand with hemiwalker support  Pt engaged in functional mobility with Ax1 along with 2 for SBA for chair and line management  Pt requires rest breaks during ambulation 2* decreased endurance  Pt setup LB dressing to don/doff socks while seated in chair  Pt mod A toileting for bladder and requires A for toilet hygiene   Family present t/o session and also provided translation t/o session  Pt will continue to benefit from activity tolerance, adls, and transfers  Plan   Treatment Interventions ADL retraining;Functional transfer training; Endurance training; Activityengagement   Goal Expiration Date 12/09/19   OT Treatment Day 1   OT Frequency 3-5x/wk   Recommendation   OT Discharge Recommendation Home with family support  (vs ST rehab pending progress)

## 2019-11-28 NOTE — PROGRESS NOTES
Progress Note - Robbin Sanchez 1950, 81 y o  female MRN: [id number]    Unit/Bed#: [facility] 741-17 Encounter: [id number]    Primary Care Provider: Jennie Don MD   Date and time admitted to hospital: 11/23/2019  4:25 PM        Acute respiratory failure with hypoxia [facility])  Assessment & Plan  Secondary to rib fractures, pain with poor inspiratory effort  Continue  IS, encouraging coughing and deep breathing  Airway clearance protocol  Currently on NC  Continue to wean down as tolerated    Fall down stairs  Assessment & Plan  PT/OT - home with family support    Multiple fractures of ribs, left side, initial encounter for closed fracture  Assessment & Plan  Pain control improved with epidural, IS, Rib fracture protocol  Epidural per APS, will keep until 11/29/19   this AM    * Anterior shoulder dislocation, right, initial encounter  Assessment & Plan  Ortho following, nonweightbearing in sling  Disposition: consider downgrade to [facility]      SUBJECTIVE:  Chief Complaint: I feel ok    Subjective: No acute events overnight  Pt says her pain is better controlled now but admits she has not been using her incentive spirometry      OBJECTIVE:     Meds/Allergies     Current Facility-Administered Medications:     acetaminophen (TYLENOL) tablet 975 mg, 975 mg, Oral, Q8H [address], Graeme Villalta PA-C, 975 mg at 11/28/19 0537    bisacodyl (DULCOLAX) rectal suppository 10 mg, 10 mg, Rectal, Daily PRN, Graeme Villalta PA-C, 10 mg at 11/27/19 1716    docusate sodium (COLACE) capsule 100 mg, 100 mg, Oral, BID, Graeme Villalta PA-C, 100 mg at 11/27/19 1716    gabapentin (NEURONTIN) capsule 300 mg, 300 mg, Oral, HS, YUVAL Ledezma-C, 300 mg at 11/27/19 2112    heparin (porcine) subcutaneous injection 5,000 Units, 5,000 Units, Subcutaneous, Q12H [address], Graeem Villalta PA-C, 5,000 Units at 11/27/19 2112    HYDROmorphone (DILAUDID) injection 0 5 mg, 0 5 mg, Intravenous, Q3H PRN, Graeme Villalta PA-C    lidocaine (LIDODERM) 5 % patch 1 patch, 1 patch, Topical, Daily, Marysue Vici, PA-C, 1 patch at 11/27/19 0807    methocarbamol (ROBAXIN) tablet 500 mg, 500 mg, Oral, Q6H [address], Marysue Anita, PA-C, 500 mg at 11/28/19 0537    ondansetron (ZOFRAN) injection 4 mg, 4 mg, Intravenous, Q6H PRN, Marysue Vici, PA-C, 4 mg at 11/25/19 1604    oxyCODONE (ROXICODONE) IR tablet 2 5 mg, 2 5 mg, Oral, Q4H PRN, Marysue Anita, PA-C    oxyCODONE (ROXICODONE) IR tablet 5 mg, 5 mg, Oral, Q4H PRN, Marysue Anita, PA-C, 5 mg at 11/26/19 1747    polyethylene glycol (MIRALAX) packet 17 g, 17 g, Oral, Daily, ABA Bower, 17 g at 11/27/19 1201    predniSONE tablet 10 mg, 10 mg, Oral, Daily, Marysue Anita, PA-C, 10 mg at 11/27/19 0807    ropivacaine 0 1% and fentaNYL 2 mcg/mL PCEA, , Epidural, Continuous, Marysue Anita, PA-C    [facility]) tablet 8 6 mg, 1 tablet, Oral, HS, Marysue Vici, PA-C, 8 6 mg at 11/26/19 2124    sertraline (ZOLOFT) tablet 100 mg, 100 mg, Oral, Daily, Marysue Vici, PA-C, 100 mg at 11/27/19 0807     Vitals:   Vitals:    11/28/19 0652   BP: 137/76   Pulse: 82   Resp: 16   Temp: 99 2 °F (37 3 °C)   SpO2: 96%       Intake/Output:  I/O       11/26 0701 - 11/27 0700 11/27 0701 - 11/28 0700 11/28 0701 - 11/29 0700    P  O  580 1100     I V  (mL/kg) 347 1 (4 3) 309 1 (3 9)     IV Piggyback  300     Total Intake(mL/kg) 927 1 (11 6) 1709 1 (21 3)     Urine (mL/kg/hr) 1850 (1) 1550 (0 8)     Stool  0     Total Output 1850 1550     Net -922 9 +159 1            Unmeasured Urine Occurrence 1 x 1 x     Unmeasured Stool Occurrence  2 x            Nutrition/GI Proph/Bowel Reg: regular    Physical Exam:   GENERAL APPEARANCE: awake, alert, well nourished, well developed  NEURO: alert, oriented, GCS 15  Motor and sensation intact  HEENT: normocephalic, atraumatic  EOMI  No conjunctival erythema  Neck supple, nontender  CV: regular rate, rhythm  Distal pulses intact  LUNGS: CTAB, no increased work of breathing   Nasal cannula in place  GI: soft, nontender, nondistended  MSK: R shoulder in sling  No edema  SKIN: warm, dry  Invasive Devices     Peripheral Intravenous Line            Peripheral IV 11/28/19 Left;Ventral (anterior) Forearm less than 1 day          Epidural Line            Epidural Catheter 11/25/19 2 days                 Lab Results: BMP/CMP: No results found for: SODIUM, K, CL, CO2, ANIONGAP, BUN, CREATININE, GLUCOSE, CALCIUM, AST, ALT, ALKPHOS, PROT, BILITOT, EGFR and CBC: No results found for: WBC, HGB, HCT, MCV, PLT, ADJUSTEDWBC, MCH, MCHC, RDW, MPV, NRBC  Imaging/EKG Studies: Xr Chest Portable    Result Date: 11/26/2019  Impression: Known left rib fractures not visible with no pneumothorax or hemothorax and no definite pulmonary contusion  Improving bibasilar atelectasis  New bilateral groundglass opacity which could be due to edema or pneumonia  Workstation performed: [id number]     Xr Chest Portable    Result Date: 11/24/2019  Impression: Increased left perihilar and lung base opacities probably representing combination of atelectasis and perhaps pulmonary contusions  Small amount of left pleural fluid  No visible pneumothorax  There is some right basilar atelectasis  The patient's known left rib fractures are not readily evident Recommend continued follow-up The study was marked in EPIC for immediate notification  Workstation performed: [id number]     Xr Shoulder 1 Vw Right    Result Date: 11/24/2019  Impression: Reduction of fracture dislocation Workstation performed: [id number]     Xr Foot 3+ Vw Right    Result Date: 11/27/2019  Impression: No acute osseous abnormality  Workstation performed: [id number]     Ct Head Without Contrast    Result Date: 11/23/2019  Impression: Large posterior scalp hematoma and laceration without subjacent skull fracture, intracranial hemorrhage, or other acute intracranial findings   I personally discussed this study with Trice FREEMAN on 11/23/2019 at 5:09 PM  Workstation performed: [id number]     Ct Spine Cervical Without Contrast    Result Date: 11/23/2019  Impression: No cervical spine fracture or traumatic malalignment  I personally discussed this study with Tigist FREEMAN on 11/23/2019 at 5:09 PM   Workstation performed: [id number]     Ct Shoulder Right Wo Contrast    Result Date: 11/23/2019  Impression: 1  Interval reduction of anterior dislocation with normal alignment  2   Hill-Sachs and osseous Bankart fractures again seen  Bankart fragment is displaced inferomedially  Workstation performed: [id number]     Xr Chest 1 View    Result Date: 11/25/2019  Impression: 1  Known left-sided rib fractures are not well demonstrated radiographically  Mild atelectasis the left lower lobe from splinting  No contusion, pneumothorax, or hemothorax  2   Right glenohumeral dislocation  3   Normal pelvis  I personally discussed this study with Tigist FREEMAN on 11/23/2019 at 5:09 PM  Workstation performed: [id number]     Xr Pelvis Ap Only 1 Or 2 Vw    Result Date: 11/25/2019  Impression: 1  Known left-sided rib fractures are not well demonstrated radiographically  Mild atelectasis the left lower lobe from splinting  No contusion, pneumothorax, or hemothorax  2   Right glenohumeral dislocation  3   Normal pelvis  I personally discussed this study with Tigist FREEMAN on 11/23/2019 at 5:09 PM  Workstation performed: [id number]     Ct Chest Abdomen Pelvis W Contrast    Result Date: 11/23/2019  Impression: 1  Multiple left-sided rib fractures, as above  Flail segment at the lateral and posterior 8th and 9th ribs  Shaft width displacement of the posterior left 10th rib with minimal subpleural hematoma  No pneumothorax, hemothorax, pulmonary contusion, or visceral injuries in the left upper quadrant of the abdomen  2   Anterior subcoracoid humeral dislocation with associated Hill-Sachs and osseous Bankart fractures   I personally discussed this study with Tigist FREEMAN on 11/23/2019 at 5:09 PM  Workstation performed: [id number]     Xr Trauma Multiple    Result Date: 11/23/2019  Impression: 1  Known left-sided rib fractures are not well demonstrated radiographically  Mild atelectasis the left lower lobe from splinting  No contusion, pneumothorax, or hemothorax  2   Right glenohumeral dislocation  3   Normal pelvis  I personally discussed this study with Makenzie FREEMAN on 11/23/2019 at 5:09 PM  Workstation performed: [id number]     Xr Chest Portable Icu    Result Date: 11/25/2019  Impression: Low lung volumes with bibasilar airspace opacities, increased within the right lower lobe  Workstation performed: [id number]     Ct Recon (no Charge)    Result Date: 11/25/2019  Impression: Multiple rib fractures, better demonstrated on prior chest CT   Workstation performed: [id number]     VTE Prophylaxis: Sequential compression device (Venodyne)  and Heparin

## 2019-11-28 NOTE — PROGRESS NOTES
Patient visited by  was anointed and blessing provided       11/27/19 5446   Clinical Encounter Type   Visited With Patient   Orthodoxy Encounters   Orthodoxy Needs Prayer   Sacramental Encounters   Sacrament of Sick-Anointing Anointed

## 2019-11-28 NOTE — PLAN OF CARE
Problem: OCCUPATIONAL THERAPY ADULT  Goal: Performs self-care activities at highest level of function for planned discharge setting  See evaluation for individualized goals  Description  Treatment Interventions: ADL retraining, Functional transfer training, UE strengthening/ROM, Endurance training, Patient/family training, Equipment evaluation/education, Fine motor coordination activities, Compensatory technique education, Energy conservation, Activityengagement          See flowsheet documentation for full assessment, interventions and recommendations  Outcome: Progressing  Note:   Limitation: Decreased ADL status, Decreased UE ROM, Decreased UE strength, Decreased endurance, Decreased self-care trans, Decreased high-level ADLs     Assessment: Pt participates in OT session with focus on LB dressing, toileting, transfers, bed mobility, and activity tolerance to increase I for d/c  Pt min A supine to sit EOB with HOB elevated and SR support  Pt min Ax1 sit to stand with hemiwalker support  Pt engaged in functional mobility with Ax1 along with 2 for SBA for chair and line management  Pt requires rest breaks during ambulation 2* decreased endurance  Pt setup LB dressing to don/doff socks while seated in chair  Pt mod A toileting for bladder and requires A for toilet hygiene  Family present t/o session and also provided translation t/o session  Pt will continue to benefit from activity tolerance, adls, and transfers       OT Discharge Recommendation: Home with family support(vs ST rehab pending progress)

## 2019-11-29 LAB
ANION GAP SERPL CALCULATED.3IONS-SCNC: 4 MMOL/L (ref 4–13)
BASOPHILS # BLD MANUAL: 0.09 THOUSAND/UL (ref 0–0.1)
BASOPHILS NFR MAR MANUAL: 1 % (ref 0–1)
BUN SERPL-MCNC: 11 MG/DL (ref 5–25)
CALCIUM SERPL-MCNC: 8.5 MG/DL (ref 8.3–10.1)
CHLORIDE SERPL-SCNC: 103 MMOL/L (ref 100–108)
CO2 SERPL-SCNC: 28 MMOL/L (ref 21–32)
CREAT SERPL-MCNC: 0.57 MG/DL (ref 0.6–1.3)
EOSINOPHIL # BLD MANUAL: 0.61 THOUSAND/UL (ref 0–0.4)
EOSINOPHIL NFR BLD MANUAL: 7 % (ref 0–6)
ERYTHROCYTE [DISTWIDTH] IN BLOOD BY AUTOMATED COUNT: 14.2 % (ref 11.6–15.1)
GFR SERPL CREATININE-BSD FRML MDRD: 96 ML/MIN/1.73SQ M
GLUCOSE SERPL-MCNC: 95 MG/DL (ref 65–140)
HCT VFR BLD AUTO: 32.8 % (ref 34.8–46.1)
HGB BLD-MCNC: 10.2 G/DL (ref 11.5–15.4)
LYMPHOCYTES # BLD AUTO: 0.44 THOUSAND/UL (ref 0.6–4.47)
LYMPHOCYTES # BLD AUTO: 5 % (ref 14–44)
MCH RBC QN AUTO: 29.1 PG (ref 26.8–34.3)
MCHC RBC AUTO-ENTMCNC: 31.1 G/DL (ref 31.4–37.4)
MCV RBC AUTO: 93 FL (ref 82–98)
MONOCYTES # BLD AUTO: 0.53 THOUSAND/UL (ref 0–1.22)
MONOCYTES NFR BLD: 6 % (ref 4–12)
NEUTROPHILS # BLD MANUAL: 6.67 THOUSAND/UL (ref 1.85–7.62)
NEUTS SEG NFR BLD AUTO: 76 % (ref 43–75)
NRBC BLD AUTO-RTO: 0 /100 WBCS
PLATELET # BLD AUTO: 227 THOUSANDS/UL (ref 149–390)
PLATELET BLD QL SMEAR: ADEQUATE
PMV BLD AUTO: 10 FL (ref 8.9–12.7)
POLYCHROMASIA BLD QL SMEAR: PRESENT
POTASSIUM SERPL-SCNC: 3.5 MMOL/L (ref 3.5–5.3)
RBC # BLD AUTO: 3.51 MILLION/UL (ref 3.81–5.12)
RBC MORPH BLD: PRESENT
SODIUM SERPL-SCNC: 135 MMOL/L (ref 136–145)
VARIANT LYMPHS # BLD AUTO: 5 %
WBC # BLD AUTO: 8.77 THOUSAND/UL (ref 4.31–10.16)

## 2019-11-29 PROCEDURE — 94668 MNPJ CHEST WALL SBSQ: CPT

## 2019-11-29 PROCEDURE — 85007 BL SMEAR W/DIFF WBC COUNT: CPT | Performed by: NURSE PRACTITIONER

## 2019-11-29 PROCEDURE — 99232 SBSQ HOSP IP/OBS MODERATE 35: CPT | Performed by: SURGERY

## 2019-11-29 PROCEDURE — 80048 BASIC METABOLIC PNL TOTAL CA: CPT | Performed by: NURSE PRACTITIONER

## 2019-11-29 PROCEDURE — 85027 COMPLETE CBC AUTOMATED: CPT | Performed by: NURSE PRACTITIONER

## 2019-11-29 PROCEDURE — 94760 N-INVAS EAR/PLS OXIMETRY 1: CPT

## 2019-11-29 PROCEDURE — 97116 GAIT TRAINING THERAPY: CPT

## 2019-11-29 RX ORDER — OXYCODONE HYDROCHLORIDE 5 MG/1
5 TABLET ORAL EVERY 4 HOURS PRN
Status: DISCONTINUED | OUTPATIENT
Start: 2019-11-29 | End: 2019-12-02 | Stop reason: HOSPADM

## 2019-11-29 RX ORDER — OXYCODONE HYDROCHLORIDE 10 MG/1
10 TABLET ORAL EVERY 4 HOURS PRN
Status: DISCONTINUED | OUTPATIENT
Start: 2019-11-29 | End: 2019-12-02 | Stop reason: HOSPADM

## 2019-11-29 RX ORDER — GABAPENTIN 100 MG/1
100 CAPSULE ORAL 2 TIMES DAILY
Status: DISCONTINUED | OUTPATIENT
Start: 2019-11-29 | End: 2019-12-02 | Stop reason: HOSPADM

## 2019-11-29 RX ADMIN — METHOCARBAMOL TABLETS 500 MG: 500 TABLET, COATED ORAL at 17:38

## 2019-11-29 RX ADMIN — ACETAMINOPHEN 975 MG: 325 TABLET ORAL at 14:47

## 2019-11-29 RX ADMIN — METHOCARBAMOL TABLETS 500 MG: 500 TABLET, COATED ORAL at 06:34

## 2019-11-29 RX ADMIN — GABAPENTIN 100 MG: 100 CAPSULE ORAL at 16:05

## 2019-11-29 RX ADMIN — OXYCODONE HYDROCHLORIDE 5 MG: 5 TABLET ORAL at 09:29

## 2019-11-29 RX ADMIN — OXYCODONE HYDROCHLORIDE 5 MG: 5 TABLET ORAL at 16:04

## 2019-11-29 RX ADMIN — HEPARIN SODIUM 5000 UNITS: 5000 INJECTION INTRAVENOUS; SUBCUTANEOUS at 21:29

## 2019-11-29 RX ADMIN — DOCUSATE SODIUM 100 MG: 100 CAPSULE, LIQUID FILLED ORAL at 17:38

## 2019-11-29 RX ADMIN — OXYCODONE HYDROCHLORIDE 10 MG: 10 TABLET ORAL at 20:00

## 2019-11-29 RX ADMIN — POLYETHYLENE GLYCOL 3350 17 G: 17 POWDER, FOR SOLUTION ORAL at 09:30

## 2019-11-29 RX ADMIN — ACETAMINOPHEN 975 MG: 325 TABLET ORAL at 06:34

## 2019-11-29 RX ADMIN — PREDNISONE 10 MG: 10 TABLET ORAL at 09:30

## 2019-11-29 RX ADMIN — GABAPENTIN 300 MG: 300 CAPSULE ORAL at 21:28

## 2019-11-29 RX ADMIN — ACETAMINOPHEN 975 MG: 325 TABLET ORAL at 21:28

## 2019-11-29 RX ADMIN — SERTRALINE HYDROCHLORIDE 100 MG: 100 TABLET ORAL at 09:30

## 2019-11-29 RX ADMIN — DOCUSATE SODIUM 100 MG: 100 CAPSULE, LIQUID FILLED ORAL at 09:30

## 2019-11-29 RX ADMIN — METHOCARBAMOL TABLETS 500 MG: 500 TABLET, COATED ORAL at 11:44

## 2019-11-29 RX ADMIN — LIDOCAINE 1 PATCH: 50 PATCH CUTANEOUS at 09:30

## 2019-11-29 RX ADMIN — SENNOSIDES 8.6 MG: 8.6 TABLET, FILM COATED ORAL at 21:28

## 2019-11-29 NOTE — PROGRESS NOTES
Progress Note - Artur Adler 1950, 81 y o  female MRN: [id number]    Unit/Bed#: [facility] 315-60 Encounter: [id number]    Primary Care Provider: Gregorio Grullon MD   Date and time admitted to hospital: 11/23/2019  4:25 PM        Hypokalemia  Assessment & Plan  Repleted    Right foot pain  Assessment & Plan  - Xray neg for fracture    Acute pain due to trauma  Assessment & Plan  - epidural removed this morning  -oral analgesia as recommended by APS    Acute respiratory failure with hypoxia (HCC)  Assessment & Plan  - 02 to be weaned as able  - continue IS  Patient is achieving anywhere between 750 and a 1000    Fall down stairs  Assessment & Plan  PT/OT - home with family support    Multiple fractures of ribs, left side, initial encounter for closed fracture  Assessment & Plan  Continue IS, pain management    * Anterior shoulder dislocation, right, initial encounter  Assessment & Plan  Ortho following, nonweightbearing in sling  Bedside rounds completed with nurse jacinto  Disposition: home      SUBJECTIVE:  Chief Complaint:  None    Subjective:  Patient has no complaints    States that she is breathing comfortably      OBJECTIVE:     Meds/Allergies     Current Facility-Administered Medications:     acetaminophen (TYLENOL) tablet 975 mg, 975 mg, Oral, Q8H [address], Vianney Mabry PA-C, 975 mg at 11/29/19 6658    bisacodyl (DULCOLAX) rectal suppository 10 mg, 10 mg, Rectal, Daily PRN, Vianney Mabry PA-C, 10 mg at 11/28/19 2028    docusate sodium (COLACE) capsule 100 mg, 100 mg, Oral, BID, Vianney Mabry PA-C, 100 mg at 11/29/19 0930    gabapentin (NEURONTIN) capsule 300 mg, 300 mg, Oral, HS, Vianney Mabry PA-C, 300 mg at 11/28/19 2151    heparin (porcine) subcutaneous injection 5,000 Units, 5,000 Units, Subcutaneous, Q12H [address], Vianney Mabry PA-C, Stopped at 11/29/19 0909    HYDROmorphone (DILAUDID) injection 0 5 mg, 0 5 mg, Intravenous, Q3H PRN, Vianney Mabry PA-C    lidocaine (LIDODERM) 5 % patch 1 patch, 1 patch, Topical, Daily, Kluas Banuelos PA-C, 1 patch at 11/29/19 0930    methocarbamol (ROBAXIN) tablet 500 mg, 500 mg, Oral, Q6H [facility], Klaus Banuelos PA-C, 500 mg at 11/29/19 1144    ondansetron (ZOFRAN) injection 4 mg, 4 mg, Intravenous, Q6H PRN, Klaus Banuelos PA-C, 4 mg at 11/28/19 1022    oxyCODONE (ROXICODONE) immediate release tablet 10 mg, 10 mg, Oral, Q4H PRN, Alannah Yan, DO    oxyCODONE (ROXICODONE) IR tablet 5 mg, 5 mg, Oral, Q4H PRN, Alannah Yan DO, 5 mg at 11/29/19 0929    polyethylene glycol (MIRALAX) packet 17 g, 17 g, Oral, Daily, ABA Bower, 17 g at 11/29/19 0930    predniSONE tablet 10 mg, 10 mg, Oral, Daily, Klaus Banuelos PA-C, 10 mg at 11/29/19 0930    ropivacaine 0 1% and fentaNYL 2 mcg/mL PCEA, , Epidural, Continuous, Klaus Banuelos PA-C, Stopped at 11/29/19 6793    senna (SENOKOT) tablet 8 6 mg, 1 tablet, Oral, HS, Klaus Banuelos PA-C, 8 6 mg at 11/28/19 2153    sertraline (ZOLOFT) tablet 100 mg, 100 mg, Oral, Daily, Klaus Banuelos PA-C, 100 mg at 11/29/19 0930     Vitals:   Vitals:    11/29/19 1035   BP: 126/72   Pulse: 79   Resp: 18   Temp: 98 9 °F (37 2 °C)   SpO2: 95%       Intake/Output:  I/O       11/27 0701 - 11/28 0700 11/28 0701 - 11/29 0700 11/29 0701 - 11/30 0700    P  O  1100 480 240    I V  (mL/kg) 309 1 (3 9) 117 9 (1 5) 149 2 (1 9)    IV Piggyback 300      Total Intake(mL/kg) 1709 1 (21 3) 597 9 (7 5) 389 2 (4 9)    Urine (mL/kg/hr) 1550 (0 8)      Stool 0      Total Output 1550      Net +159 1 +597 9 +389 2           Unmeasured Urine Occurrence 1 x 4 x     Unmeasured Stool Occurrence 2 x 3 x            Nutrition/GI Proph/Bowel Reg:  Colace, Senokot    Physical Exam:   Constitution: patient lying in bed, appears comfortable  HEENT: DON, EOMI  CV: regular rate and rhythm, +2 DP pulses  Pulm: CTA, no wheezes, rhonchi or crackles, unlabored, equal bilaterally  Abd: soft, nontender, nondistended, active bowel sounds  Extremities: moves all extremities, equal strength, no edema, no skin breakdown  Neuro: A&O, no focal deficits  Skin: no rash or breakdown, warm          Invasive Devices     Peripheral Intravenous Line            Peripheral IV 11/28/19 Left;Ventral (anterior) Forearm less than 1 day                 Lab Results: Results: I have personally reviewed pertinent reports  Imaging/EKG Studies: Results: I have personally reviewed pertinent reports      Other Studies: n/a  VTE Prophylaxis: Heparin

## 2019-11-29 NOTE — PROGRESS NOTES
Progress Note - Acute Pain Service    [facility] 81 y o  female MRN: [id number]  Unit/Bed#: [facility] 617-01 Encounter: [id number]      Assessment:   Principal Problem:    Anterior shoulder dislocation, right, initial encounter  Active Problems:    Multiple fractures of ribs, left side, initial encounter for closed fracture    Fall down stairs    Acute respiratory failure with hypoxia (HCC)    Acute pain due to trauma    Right foot pain    Hypokalemia      Plan:   Discontinue epidural today, catheter removed with intact tip  Scheduled APAP  Increase Oxy 5-10 PRN q4  Breakthrough hydromorphone  Continue zoloft  Continue bowel regimen  Apply lidocaine patches      Call with changes in patient status  APS sign off  Thank you for the Consult  Please contact APS ( [phone]) with any further questions    Pain History  Current pain location(s): back  Pain Scale:   1  Quality: dull  24 hour history: satisfactory analgesia with PCEA  Doesn't use demand much    Good cough  Having minimal bowel movement  Eager to discuss next steps       Opioid requirement previous 24 hours: 5mg oxycodone    Meds/Allergies   all current active meds have been reviewed, current meds:   Current Facility-Administered Medications   Medication Dose Route Frequency    acetaminophen (TYLENOL) tablet 975 mg  975 mg Oral Q8H [address]    bisacodyl (DULCOLAX) rectal suppository 10 mg  10 mg Rectal Daily PRN    docusate sodium (COLACE) capsule 100 mg  100 mg Oral BID    gabapentin (NEURONTIN) capsule 300 mg  300 mg Oral HS    heparin (porcine) subcutaneous injection 5,000 Units  5,000 Units Subcutaneous Q12H [address]    HYDROmorphone (DILAUDID) injection 0 5 mg  0 5 mg Intravenous Q3H PRN    lidocaine (LIDODERM) 5 % patch 1 patch  1 patch Topical Daily    methocarbamol (ROBAXIN) tablet 500 mg  500 mg Oral Q6H [address]    ondansetron (ZOFRAN) injection 4 mg  4 mg Intravenous Q6H PRN    oxyCODONE (ROXICODONE) immediate release tablet 10 mg 10 mg Oral Q4H PRN    oxyCODONE (ROXICODONE) IR tablet 5 mg  5 mg Oral Q4H PRN    polyethylene glycol (MIRALAX) packet 17 g  17 g Oral Daily    predniSONE tablet 10 mg  10 mg Oral Daily    ropivacaine 0 1% and fentaNYL 2 mcg/mL PCEA   Epidural Continuous    senna (SENOKOT) tablet 8 6 mg  1 tablet Oral HS    sertraline (ZOLOFT) tablet 100 mg  100 mg Oral Daily    and PTA meds:   None       No Known Allergies    Objective     Temp:  [98 2 °F (36 8 °C)-100 °F (37 8 °C)] 99 4 °F (37 4 °C)  HR:  [75-85] 78  Resp:  [16-19] 19  BP: (124-143)/(77-80) 143/80    Physical Exam   Constitutional: She is oriented to person, place, and time  She appears well-developed and well-nourished  HENT:   Head: Normocephalic  Eyes: Pupils are equal, round, and reactive to light  EOM are normal    Neck: Normal range of motion  Neck supple  Cardiovascular: Normal rate and regular rhythm  Pulmonary/Chest: Effort normal and breath sounds normal  No respiratory distress  Abdominal: Soft  Bowel sounds are normal  She exhibits no distension  Neurological: She is alert and oriented to person, place, and time  Skin: Skin is warm and dry  Capillary refill takes less than 2 seconds  Nursing note and vitals reviewed  NC 4lpm  Epidural site without erythema or tenderness    Lab Results:   Results from last 7 days   Lab Units 11/29/19  0448   WBC Thousand/uL 8 77   HEMOGLOBIN g/dL 10 2*   HEMATOCRIT % 32 8*   PLATELETS Thousands/uL 227      Results from last 7 days   Lab Units 11/29/19  0448  11/23/19  1632   POTASSIUM mmol/L 3 5   < >  --    CHLORIDE mmol/L 103   < >  --    CO2 mmol/L 28   < >  --    CO2, I-STAT mmol/L  --   --  25   BUN mg/dL 11   < >  --    CREATININE mg/dL 0 57*   < >  --    CALCIUM mg/dL 8 5   < >  --    GLUCOSE, ISTAT mg/dl  --   --  130    < > = values in this interval not displayed  Imaging Studies: I have personally reviewed pertinent reports      EKG, Pathology, and Other Studies: I have personally reviewed pertinent reports  Counseling / Coordination of Care  Total floor / unit time spent today 30 minutes  Greater than 50% of total time was spent with the patient and / or family counseling and / or coordination of care   A description of the counseling / coordination of care: discussion   of transition from PCEA to oral opioid analgesics & discussion of opioid addiction risk in setting of acute injuries     Minor DO [place]   Acute Pain Service

## 2019-11-29 NOTE — PHYSICAL THERAPY NOTE
Physical Therapy Progress Note     11/29/19 3663   Pain Assessment   Pain Assessment No/denies pain   Restrictions/Precautions   RUE Weight Bearing Per Order NWB   Braces or Orthoses Splint;Sling   Other Precautions WBS;Pain; Fall Risk;O2   Subjective   Subjective Pt encountered supine in bed, initially resistant to therapy due to fatigue, but agreeable after discussion with rehab aide & family  Pt reports no pain with mobility, but does demonstrate slight dyspnea while walking  Bed Mobility   Supine to Sit 4  Minimal assistance   Additional items Assist x 1; Increased time required;HOB elevated   Sit to Supine 4  Minimal assistance   Additional items Assist x 1; Increased time required;LE management   Transfers   Sit to Stand 5  Supervision   Additional items Assist x 1; Armrests; Increased time required   Stand to Sit 5  Supervision   Additional items Assist x 1; Armrests; Increased time required   Ambulation/Elevation   Gait pattern Excessively slow; Step to;Short stride; Shuffling;Decreased foot clearance; Antalgic; Improper Weight shift   Gait Assistance 4  Minimal assist   Additional items Assist x 1   Assistive Device Small base quad cane   Distance 180' with 3 brief standing rests   Balance   Static Sitting Fair +   Static Standing Fair   Ambulatory Poor +   Endurance Deficit   Endurance Deficit Yes   Endurance Deficit Description fatigue   Activity Tolerance   Activity Tolerance Patient tolerated treatment well;Patient limited by fatigue   Nurse Made Aware yes   Assessment   Prognosis Good   Problem List Decreased strength;Decreased endurance; Impaired balance;Decreased mobility; Decreased coordination;Decreased safety awareness;Pain   Assessment Pt continues to make progress with functional mobilty this session  Ambulated similar distances to last session, but did so without seated rests & with slightly improved pacing overall    Did require brief standing rests to recover, but SpO2 remained >93% when assessed on 3L O2 nc   Upon return to room, pt requested to lie back down in bed  Pt & family educated on importance of continued mobility while in acute healing phase & need to attempt steps next session to clear for discharge home  pt & family verbalized understanding & offer no new questions or concerns at this time  Barriers to Discharge Inaccessible home environment   Barriers to Discharge Comments continued mobility & FILIPE   Goals   Patient Goals to rest   STG Expiration Date 12/09/19   PT Treatment Day 3   Plan   Treatment/Interventions Functional transfer training;LE strengthening/ROM; Elevations; Endurance training; Therapeutic exercise;Patient/family training;Equipment eval/education; Bed mobility;Gait training   Progress Progressing toward goals   PT Frequency   (3-6x/week)   Recommendation   Recommendation Home PT; Home with family support   Equipment Recommended Other (Comment)  (NBQC at this time)   PT - OK to Discharge No     Kole Arambula, PTA

## 2019-11-29 NOTE — PLAN OF CARE
Problem: PHYSICAL THERAPY ADULT  Goal: Performs mobility at highest level of function for planned discharge setting  See evaluation for individualized goals  Description  Treatment/Interventions: Functional transfer training, LE strengthening/ROM, Therapeutic exercise, Endurance training, Equipment eval/education, Bed mobility, Gait training, Spoke to nursing, OT, Family  Equipment Recommended: (Continue to assess as appropriate)       See flowsheet documentation for full assessment, interventions and recommendations  Outcome: Progressing  Note:   Prognosis: Good  Problem List: Decreased strength, Decreased endurance, Impaired balance, Decreased mobility, Decreased coordination, Decreased safety awareness, Pain  Assessment: Pt continues to make progress with functional mobilty this session  Ambulated similar distances to last session, but did so without seated rests & with slightly improved pacing overall  Did require brief standing rests to recover, but SpO2 remained >93% when assessed on 3L O2 nc  Upon return to room, pt requested to lie back down in bed  Pt & family educated on importance of continued mobility while in acute healing phase & need to attempt steps next session to clear for discharge home  pt & family verbalized understanding & offer no new questions or concerns at this time  Barriers to Discharge: Inaccessible home environment  Barriers to Discharge Comments: continued mobility & FILIPE  Recommendation: Home PT, Home with family support     PT - OK to Discharge: No    See flowsheet documentation for full assessment

## 2019-11-29 NOTE — PROGRESS NOTES
11/29/19 1300   Clinical Encounter Type   Visited With Patient; Family   Routine Visit Follow-up   Mandaen Encounters   Mandaen Needs Prayer  ( blessing)   Sacramental Encounters   Sacrament of Sick-Anointing Anointed

## 2019-11-30 LAB
ANION GAP SERPL CALCULATED.3IONS-SCNC: 6 MMOL/L (ref 4–13)
ANISOCYTOSIS BLD QL SMEAR: PRESENT
BASOPHILS # BLD MANUAL: 0 THOUSAND/UL (ref 0–0.1)
BASOPHILS NFR MAR MANUAL: 0 % (ref 0–1)
BUN SERPL-MCNC: 10 MG/DL (ref 5–25)
CALCIUM SERPL-MCNC: 8.9 MG/DL (ref 8.3–10.1)
CHLORIDE SERPL-SCNC: 103 MMOL/L (ref 100–108)
CO2 SERPL-SCNC: 27 MMOL/L (ref 21–32)
CREAT SERPL-MCNC: 0.61 MG/DL (ref 0.6–1.3)
EOSINOPHIL # BLD MANUAL: 0.83 THOUSAND/UL (ref 0–0.4)
EOSINOPHIL NFR BLD MANUAL: 9 % (ref 0–6)
ERYTHROCYTE [DISTWIDTH] IN BLOOD BY AUTOMATED COUNT: 14.2 % (ref 11.6–15.1)
GFR SERPL CREATININE-BSD FRML MDRD: 94 ML/MIN/1.73SQ M
GLUCOSE SERPL-MCNC: 87 MG/DL (ref 65–140)
HCT VFR BLD AUTO: 35.6 % (ref 34.8–46.1)
HGB BLD-MCNC: 11.1 G/DL (ref 11.5–15.4)
LYMPHOCYTES # BLD AUTO: 0.92 THOUSAND/UL (ref 0.6–4.47)
LYMPHOCYTES # BLD AUTO: 10 % (ref 14–44)
MCH RBC QN AUTO: 29 PG (ref 26.8–34.3)
MCHC RBC AUTO-ENTMCNC: 31.2 G/DL (ref 31.4–37.4)
MCV RBC AUTO: 93 FL (ref 82–98)
MONOCYTES # BLD AUTO: 1.02 THOUSAND/UL (ref 0–1.22)
MONOCYTES NFR BLD: 11 % (ref 4–12)
NEUTROPHILS # BLD MANUAL: 6.46 THOUSAND/UL (ref 1.85–7.62)
NEUTS SEG NFR BLD AUTO: 70 % (ref 43–75)
NRBC BLD AUTO-RTO: 0 /100 WBCS
PLATELET # BLD AUTO: 268 THOUSANDS/UL (ref 149–390)
PLATELET BLD QL SMEAR: ADEQUATE
PMV BLD AUTO: 10.1 FL (ref 8.9–12.7)
POLYCHROMASIA BLD QL SMEAR: PRESENT
POTASSIUM SERPL-SCNC: 3.6 MMOL/L (ref 3.5–5.3)
RBC # BLD AUTO: 3.83 MILLION/UL (ref 3.81–5.12)
RBC MORPH BLD: PRESENT
SODIUM SERPL-SCNC: 136 MMOL/L (ref 136–145)
WBC # BLD AUTO: 9.23 THOUSAND/UL (ref 4.31–10.16)

## 2019-11-30 PROCEDURE — 99232 SBSQ HOSP IP/OBS MODERATE 35: CPT | Performed by: SURGERY

## 2019-11-30 PROCEDURE — 94668 MNPJ CHEST WALL SBSQ: CPT

## 2019-11-30 PROCEDURE — 80048 BASIC METABOLIC PNL TOTAL CA: CPT | Performed by: PHYSICIAN ASSISTANT

## 2019-11-30 PROCEDURE — 85027 COMPLETE CBC AUTOMATED: CPT | Performed by: PHYSICIAN ASSISTANT

## 2019-11-30 PROCEDURE — 94760 N-INVAS EAR/PLS OXIMETRY 1: CPT

## 2019-11-30 PROCEDURE — 85007 BL SMEAR W/DIFF WBC COUNT: CPT | Performed by: PHYSICIAN ASSISTANT

## 2019-11-30 RX ADMIN — OXYCODONE HYDROCHLORIDE 10 MG: 10 TABLET ORAL at 03:42

## 2019-11-30 RX ADMIN — LIDOCAINE 1 PATCH: 50 PATCH CUTANEOUS at 09:47

## 2019-11-30 RX ADMIN — OXYCODONE HYDROCHLORIDE 10 MG: 10 TABLET ORAL at 19:57

## 2019-11-30 RX ADMIN — GABAPENTIN 300 MG: 300 CAPSULE ORAL at 21:13

## 2019-11-30 RX ADMIN — HEPARIN SODIUM 5000 UNITS: 5000 INJECTION INTRAVENOUS; SUBCUTANEOUS at 09:47

## 2019-11-30 RX ADMIN — HYDROMORPHONE HYDROCHLORIDE 0.5 MG: 1 INJECTION, SOLUTION INTRAMUSCULAR; INTRAVENOUS; SUBCUTANEOUS at 14:25

## 2019-11-30 RX ADMIN — GABAPENTIN 100 MG: 100 CAPSULE ORAL at 09:47

## 2019-11-30 RX ADMIN — GABAPENTIN 100 MG: 100 CAPSULE ORAL at 15:52

## 2019-11-30 RX ADMIN — HYDROMORPHONE HYDROCHLORIDE 0.5 MG: 1 INJECTION, SOLUTION INTRAMUSCULAR; INTRAVENOUS; SUBCUTANEOUS at 21:20

## 2019-11-30 RX ADMIN — SERTRALINE HYDROCHLORIDE 100 MG: 100 TABLET ORAL at 09:47

## 2019-11-30 RX ADMIN — OXYCODONE HYDROCHLORIDE 10 MG: 10 TABLET ORAL at 11:51

## 2019-11-30 RX ADMIN — METHOCARBAMOL TABLETS 500 MG: 500 TABLET, COATED ORAL at 07:41

## 2019-11-30 RX ADMIN — METHOCARBAMOL TABLETS 500 MG: 500 TABLET, COATED ORAL at 17:34

## 2019-11-30 RX ADMIN — METHOCARBAMOL TABLETS 500 MG: 500 TABLET, COATED ORAL at 00:04

## 2019-11-30 RX ADMIN — ACETAMINOPHEN 975 MG: 325 TABLET ORAL at 14:25

## 2019-11-30 RX ADMIN — OXYCODONE HYDROCHLORIDE 5 MG: 5 TABLET ORAL at 00:04

## 2019-11-30 RX ADMIN — DOCUSATE SODIUM 100 MG: 100 CAPSULE, LIQUID FILLED ORAL at 09:47

## 2019-11-30 RX ADMIN — ACETAMINOPHEN 975 MG: 325 TABLET ORAL at 07:41

## 2019-11-30 RX ADMIN — OXYCODONE HYDROCHLORIDE 10 MG: 10 TABLET ORAL at 15:52

## 2019-11-30 RX ADMIN — DOCUSATE SODIUM 100 MG: 100 CAPSULE, LIQUID FILLED ORAL at 17:34

## 2019-11-30 RX ADMIN — SENNOSIDES 8.6 MG: 8.6 TABLET, FILM COATED ORAL at 21:13

## 2019-11-30 RX ADMIN — METHOCARBAMOL TABLETS 500 MG: 500 TABLET, COATED ORAL at 11:51

## 2019-11-30 RX ADMIN — OXYCODONE HYDROCHLORIDE 10 MG: 10 TABLET ORAL at 07:42

## 2019-11-30 RX ADMIN — PREDNISONE 10 MG: 10 TABLET ORAL at 09:47

## 2019-11-30 RX ADMIN — HEPARIN SODIUM 5000 UNITS: 5000 INJECTION INTRAVENOUS; SUBCUTANEOUS at 21:13

## 2019-11-30 RX ADMIN — ACETAMINOPHEN 975 MG: 325 TABLET ORAL at 21:13

## 2019-11-30 NOTE — PROGRESS NOTES
Progress Note - Aimee Devine 1950, 81 y o  female MRN: [id number]    Unit/Bed#: [facility] 155-29 Encounter: [id number]    Primary Care Provider: Yolis Lechuga MD   Date and time admitted to hospital: 11/23/2019  4:25 PM        Right foot pain  Assessment & Plan  - Xray neg for fracture    Acute pain due to trauma  Assessment & Plan  - epidural removed 11/29  -oral analgesia as recommended by APS    Acute respiratory failure with hypoxia (HCC)  Assessment & Plan  - 02 to be weaned as able  - continue IS  Patient is refusing to perform IS this am due to pain    Fall down stairs  Assessment & Plan  PT/OT - home with family support    Multiple fractures of ribs, left side, initial encounter for closed fracture  Assessment & Plan  Continue IS, pain management    * Anterior shoulder dislocation, right, initial encounter  Assessment & Plan  Ortho following, nonweightbearing in sling  Bedside rounds completed with nurse yes  Disposition: continue level of care until pain control      SUBJECTIVE:  Chief Complaint: Rt rib pain    Subjective: Pt states that since the epidural was removed she has had increased levels of pain that are poorly controlled with PO medications         OBJECTIVE:     Meds/Allergies     Current Facility-Administered Medications:     acetaminophen (TYLENOL) tablet 975 mg, 975 mg, Oral, Q8H [address], Irl Fitch, PA-C, 975 mg at 11/30/19 0741    bisacodyl (DULCOLAX) rectal suppository 10 mg, 10 mg, Rectal, Daily PRN, Irl Fitch, PA-C, 10 mg at 11/28/19 2028    docusate sodium (COLACE) capsule 100 mg, 100 mg, Oral, BID, Irl Fitch, PA-C, 100 mg at 11/29/19 1738    gabapentin (NEURONTIN) capsule 100 mg, 100 mg, Oral, BID, Skeet Dominion, PA-C, 100 mg at 11/29/19 1605    gabapentin (NEURONTIN) capsule 300 mg, 300 mg, Oral, HS, Irl Fitch, PA-C, 300 mg at 11/29/19 2128    heparin (porcine) subcutaneous injection 5,000 Units, 5,000 Units, Subcutaneous, Q12H [address], Irl Fitch, PA-C, 5,000 Units at 11/29/19 2129    HYDROmorphone (DILAUDID) injection 0 5 mg, 0 5 mg, Intravenous, Q3H PRN, Briana Hugo PA-C    lidocaine (LIDODERM) 5 % patch 1 patch, 1 patch, Topical, Daily, Briana Hugo PA-C, 1 patch at 11/29/19 0930    methocarbamol (ROBAXIN) tablet 500 mg, 500 mg, Oral, Q6H [address], Briana Hugo PA-C, 500 mg at 11/30/19 0741    ondansetron (ZOFRAN) injection 4 mg, 4 mg, Intravenous, Q6H PRN, Briana Hugo PA-C, 4 mg at 11/28/19 1022    oxyCODONE (ROXICODONE) immediate release tablet 10 mg, 10 mg, Oral, Q4H PRN, [facility], 10 mg at 11/30/19 0742    oxyCODONE (ROXICODONE) IR tablet 5 mg, 5 mg, Oral, Q4H PRN, [facility], 5 mg at 11/30/19 0004    polyethylene glycol (MIRALAX) packet 17 g, 17 g, Oral, Daily, Haley ABA Mcclelland, 17 g at 11/29/19 0930    predniSONE tablet 10 mg, 10 mg, Oral, Daily, Briana Hugo PA-C, 10 mg at 11/29/19 0930    ropivacaine 0 1% and fentaNYL 2 mcg/mL PCEA, , Epidural, Continuous, Briana Hugo PA-C, Stopped at 11/29/19 3008    senna (SENOKOT) tablet 8 6 mg, 1 tablet, Oral, HS, Briana Hugo PA-C, 8 6 mg at 11/29/19 2128    sertraline (ZOLOFT) tablet 100 mg, 100 mg, Oral, Daily, Briana Hugo PA-C, 100 mg at 11/29/19 0930     Vitals:   Vitals:    11/30/19 0747   BP: 132/77   Pulse: 83   Resp: 20   Temp: 98 4 °F (36 9 °C)   SpO2: 90%       Intake/Output:  I/O       11/28 0701 - 11/29 0700 11/29 0701 - 11/30 0700 11/30 0701 - 12/01 0700    P  O  480 360     I V  (mL/kg) 117 9 (1 5) 149 2 (1 9)     IV Piggyback       Total Intake(mL/kg) 597 9 (7 5) 509 2 (6 3)     Urine (mL/kg/hr)  875 (0 5)     Stool       Total Output  875     Net +597 9 -365 9            Unmeasured Urine Occurrence 4 x 3 x     Unmeasured Stool Occurrence 3 x             Nutrition/GI Proph/Bowel Reg: Regular, senokot, colace    Physical Exam:   General: VSS, NAD, awake, alert  Well-nourished, well-developed  Head: Normocephalic  Eyes:EOM-I  No hyphema     No subconjunctival hemorrhages  Symmetrical lids  ENT: Atraumatic external nose and ears      Neck: Symmetric  CV: RRR  Peripheral pulses +2 throughout  Lungs:   Unlabored  CTAB, lungs sounds equal bilateral    No tachypnea  Abd: +BS, soft, NT/ND    MSK:   FROM, TTP over Rt shoulder and chest wall  Skin: Dry, intact  Neuro: AAOx3, GCS 15, CN II-XII grossly intact  Motor grossly intact  Psychiatric/Behavioral: Appropriate mood and affect      Invasive Devices     Peripheral Intravenous Line            Peripheral IV 11/28/19 Left;Ventral (anterior) Forearm 1 day                 Lab Results: Results: I have personally reviewed pertinent reports  Imaging/EKG Studies: Results: I have personally reviewed pertinent reports      Other Studies:    VTE Prophylaxis: Enoxaparin (Lovenox)

## 2019-11-30 NOTE — PLAN OF CARE
Problem: Prexisting or High Potential for Compromised Skin Integrity  Goal: Skin integrity is maintained or improved  Description  INTERVENTIONS:  - Identify patients at risk for skin breakdown  - Assess and monitor skin integrity  - Assess and monitor nutrition and hydration status  - Monitor labs   - Assess for incontinence   - Turn and reposition patient  - Assist with mobility/ambulation  - Relieve pressure over bony prominences  - Avoid friction and shearing  - Provide appropriate hygiene as needed including keeping skin clean and dry  - Evaluate need for skin moisturizer/barrier cream  - Collaborate with interdisciplinary team   - Patient/family teaching  - Consider wound care consult   Outcome: Progressing     Problem: Potential for Falls  Goal: Patient will remain free of falls  Description  INTERVENTIONS:  - Assess patient frequently for physical needs  -  Identify cognitive and physical deficits and behaviors that affect risk of falls  -  [place] fall precautions as indicated by assessment   - Educate patient/family on patient safety including physical limitations  - Instruct patient to call for assistance with activity based on assessment  - Modify environment to reduce risk of injury  - Consider OT/PT consult to assist with strengthening/mobility  Outcome: Progressing     Problem: NEUROSENSORY - ADULT  Goal: Achieves maximal functionality and self care  Description  INTERVENTIONS  - Monitor swallowing and airway patency with patient fatigue and changes in neurological status  - Encourage and assist patient to increase activity and self care     - Encourage visually impaired, hearing impaired and aphasic patients to use assistive/communication devices  Outcome: Progressing     Problem: CARDIOVASCULAR - ADULT  Goal: Maintains optimal cardiac output and hemodynamic stability  Description  INTERVENTIONS:  - Monitor I/O, vital signs and rhythm  - Monitor for S/S and trends of decreased cardiac output  - Administer and titrate ordered vasoactive medications to optimize hemodynamic stability  - Assess quality of pulses, skin color and temperature  - Assess for signs of decreased coronary artery perfusion  - Instruct patient to report change in severity of symptoms  Outcome: Progressing     Problem: RESPIRATORY - ADULT  Goal: Achieves optimal ventilation and oxygenation  Description  INTERVENTIONS:  - Assess for changes in respiratory status  - Assess for changes in mentation and behavior  - Position to facilitate oxygenation and minimize respiratory effort  - Oxygen administered by appropriate delivery if ordered  - Initiate smoking cessation education as indicated  - Encourage broncho-pulmonary hygiene including cough, deep breathe, Incentive Spirometry  - Assess the need for suctioning and aspirate as needed  - Assess and instruct to report SOB or any respiratory difficulty  - Respiratory Therapy support as indicated  Outcome: Progressing     Problem: GENITOURINARY - ADULT  Goal: Maintains or returns to baseline urinary function  Description  INTERVENTIONS:  - Assess urinary function  - Encourage oral fluids to ensure adequate hydration if ordered  - Administer IV fluids as ordered to ensure adequate hydration  - Administer ordered medications as needed  - Offer frequent toileting  - Follow urinary retention protocol if ordered  Outcome: Progressing     Problem: METABOLIC, FLUID AND ELECTROLYTES - ADULT  Goal: Electrolytes maintained within normal limits  Description  INTERVENTIONS:  - Monitor labs and assess patient for signs and symptoms of electrolyte imbalances  - Administer electrolyte replacement as ordered  - Monitor response to electrolyte replacements, including repeat lab results as appropriate  - Instruct patient on fluid and nutrition as appropriate  Outcome: Progressing  Goal: Fluid balance maintained  Description  INTERVENTIONS:  - Monitor labs   - Monitor I/O and WT  - Instruct patient on fluid and nutrition as appropriate  - Assess for signs & symptoms of volume excess or deficit  Outcome: Progressing     Problem: SKIN/TISSUE INTEGRITY - ADULT  Goal: Skin integrity remains intact  Description  INTERVENTIONS  - Identify patients at risk for skin breakdown  - Assess and monitor skin integrity  - Assess and monitor nutrition and hydration status  - Monitor labs (i e  albumin)  - Assess for incontinence   - Turn and reposition patient  - Assist with mobility/ambulation  - Relieve pressure over bony prominences  - Avoid friction and shearing  - Provide appropriate hygiene as needed including keeping skin clean and dry  - Evaluate need for skin moisturizer/barrier cream  - Collaborate with interdisciplinary team (i e  Nutrition, Rehabilitation, etc )   - Patient/family teaching  Outcome: Progressing     Problem: HEMATOLOGIC - ADULT  Goal: Maintains hematologic stability  Description  INTERVENTIONS  - Assess for signs and symptoms of bleeding or hemorrhage  - Monitor labs  - Administer supportive blood products/factors as ordered and appropriate  Outcome: Progressing     Problem: MUSCULOSKELETAL - ADULT  Goal: Maintain or return mobility to safest level of function  Description  INTERVENTIONS:  - Assess patient's ability to carry out ADLs; assess patient's baseline for ADL function and identify physical deficits which impact ability to perform ADLs (bathing, care of mouth/teeth, toileting, grooming, dressing, etc )  - Assess/evaluate cause of self-care deficits   - Assess range of motion  - Assess patient's mobility  - Assess patient's need for assistive devices and provide as appropriate  - Encourage maximum independence but intervene and supervise when necessary  - Involve family in performance of ADLs  - Assess for home care needs following discharge   - Consider OT consult to assist with ADL evaluation and planning for discharge  - Provide patient education as appropriate  Outcome: Progressing     Problem: Nutrition/Hydration-ADULT  Goal: Nutrient/Hydration intake appropriate for improving, restoring or maintaining nutritional needs  Description  Monitor and assess patient's nutrition/hydration status for malnutrition  Collaborate with interdisciplinary team and initiate plan and interventions as ordered  Monitor patient's weight and dietary intake as ordered or per policy  Utilize nutrition screening tool and intervene as necessary  Determine patient's food preferences and provide high-protein, high-caloric foods as appropriate       INTERVENTIONS:  - Monitor oral intake, urinary output, labs, and treatment plans  - Assess nutrition and hydration status and recommend course of action  - Evaluate amount of meals eaten  - Assist patient with eating if necessary   - Allow adequate time for meals  - Recommend/ encourage appropriate diets, oral nutritional supplements, and vitamin/mineral supplements  - Order, calculate, and assess calorie counts as needed  - Recommend, monitor, and adjust tube feedings and TPN/PPN based on assessed needs  - Assess need for intravenous fluids  - Provide specific nutrition/hydration education as appropriate  - Include patient/family/caregiver in decisions related to nutrition  Outcome: Progressing     Problem: PAIN - ADULT  Goal: Verbalizes/displays adequate comfort level or baseline comfort level  Description  Interventions:  - Encourage patient to monitor pain and request assistance  - Assess pain using appropriate pain scale  - Administer analgesics based on type and severity of pain and evaluate response  - Implement non-pharmacological measures as appropriate and evaluate response  - Consider cultural and social influences on pain and pain management  - Notify physician/advanced practitioner if interventions unsuccessful or patient reports new pain  Outcome: Progressing     Problem: INFECTION - ADULT  Goal: Absence or prevention of progression during hospitalization  Description  INTERVENTIONS:  - Assess and monitor for signs and symptoms of infection  - Monitor lab/diagnostic results  - Monitor all insertion sites, i e  indwelling lines, tubes, and drains  - Monitor endotracheal if appropriate and nasal secretions for changes in amount and color  - [place] appropriate cooling/warming therapies per order  - Administer medications as ordered  - Instruct and encourage patient and family to use good hand hygiene technique  - Identify and instruct in appropriate isolation precautions for identified infection/condition  Outcome: Progressing     Problem: SAFETY ADULT  Goal: Maintain or return to baseline ADL function  Description  INTERVENTIONS:  -  Assess patient's ability to carry out ADLs; assess patient's baseline for ADL function and identify physical deficits which impact ability to perform ADLs (bathing, care of mouth/teeth, toileting, grooming, dressing, etc )  - Assess/evaluate cause of self-care deficits   - Assess range of motion  - Assess patient's mobility; develop plan if impaired  - Assess patient's need for assistive devices and provide as appropriate  - Encourage maximum independence but intervene and supervise when necessary  - Involve family in performance of ADLs  - Assess for home care needs following discharge   - Consider OT consult to assist with ADL evaluation and planning for discharge  - Provide patient education as appropriate  Outcome: Progressing  Goal: Maintain or return mobility status to optimal level  Description  INTERVENTIONS:  - Assess patient's baseline mobility status (ambulation, transfers, stairs, etc )    - Identify cognitive and physical deficits and behaviors that affect mobility  - Identify mobility aids required to assist with transfers and/or ambulation (gait belt, sit-to-stand, lift, walker, cane, etc )  - [place] fall precautions as indicated by assessment  - Record patient progress and toleration of activity level on Mobility SBAR; progress patient to next Phase/Stage  - Instruct patient to call for assistance with activity based on assessment  - Consider rehabilitation consult to assist with strengthening/weightbearing, etc   Outcome: Progressing     Problem: DISCHARGE PLANNING  Goal: Discharge to home or other facility with appropriate resources  Description  INTERVENTIONS:  - Identify barriers to discharge w/patient and caregiver  - Arrange for needed discharge resources and transportation as appropriate  - Identify discharge learning needs (meds, wound care, etc )  - Arrange for interpretive services to assist at discharge as needed  - Refer to Case Management Department for coordinating discharge planning if the patient needs post-hospital services based on physician/advanced practitioner order or complex needs related to functional status, cognitive ability, or social support system  Outcome: Progressing     Problem: Knowledge Deficit  Goal: Patient/family/caregiver demonstrates understanding of disease process, treatment plan, medications, and discharge instructions  Description  Complete learning assessment and assess knowledge base    Interventions:  - Provide teaching at level of understanding  - Provide teaching via preferred learning methods  Outcome: Progressing     Problem: COPING  Goal: Pt/Family able to verbalize concerns and demonstrate effective coping strategies  Description  INTERVENTIONS:  - Assist patient/family to identify coping skills, available support systems and cultural and spiritual values  - Provide emotional support, including active listening and acknowledgement of concerns of patient and caregivers  - Reduce environmental stimuli, as able  - Provide patient education  - Assess for spiritual pain/suffering and initiate spiritual care, including notification of Pastoral Care or matt based community as needed  - Assess effectiveness of coping strategies  Outcome: Progressing  Goal: Will report anxiety at manageable levels  Description  INTERVENTIONS:  - Administer medication as ordered  - Teach and encourage coping skills  - Provide emotional support  - Assess patient/family for anxiety and ability to cope  Outcome: Progressing

## 2019-12-01 PROCEDURE — 94668 MNPJ CHEST WALL SBSQ: CPT

## 2019-12-01 PROCEDURE — 94664 DEMO&/EVAL PT USE INHALER: CPT

## 2019-12-01 PROCEDURE — 99232 SBSQ HOSP IP/OBS MODERATE 35: CPT | Performed by: SURGERY

## 2019-12-01 RX ORDER — DIAZEPAM 5 MG/1
2.5 TABLET ORAL EVERY 8 HOURS
Status: DISCONTINUED | OUTPATIENT
Start: 2019-12-01 | End: 2019-12-02 | Stop reason: HOSPADM

## 2019-12-01 RX ORDER — LANOLIN ALCOHOL/MO/W.PET/CERES
3 CREAM (GRAM) TOPICAL
Status: DISCONTINUED | OUTPATIENT
Start: 2019-12-01 | End: 2019-12-02 | Stop reason: HOSPADM

## 2019-12-01 RX ADMIN — SERTRALINE HYDROCHLORIDE 100 MG: 100 TABLET ORAL at 08:39

## 2019-12-01 RX ADMIN — HEPARIN SODIUM 5000 UNITS: 5000 INJECTION INTRAVENOUS; SUBCUTANEOUS at 20:56

## 2019-12-01 RX ADMIN — OXYCODONE HYDROCHLORIDE 10 MG: 10 TABLET ORAL at 10:52

## 2019-12-01 RX ADMIN — OXYCODONE HYDROCHLORIDE 5 MG: 5 TABLET ORAL at 05:39

## 2019-12-01 RX ADMIN — GABAPENTIN 300 MG: 300 CAPSULE ORAL at 21:04

## 2019-12-01 RX ADMIN — LIDOCAINE 1 PATCH: 50 PATCH CUTANEOUS at 08:39

## 2019-12-01 RX ADMIN — METHOCARBAMOL TABLETS 500 MG: 500 TABLET, COATED ORAL at 13:33

## 2019-12-01 RX ADMIN — GABAPENTIN 100 MG: 100 CAPSULE ORAL at 08:39

## 2019-12-01 RX ADMIN — POLYETHYLENE GLYCOL 3350 17 G: 17 POWDER, FOR SOLUTION ORAL at 08:39

## 2019-12-01 RX ADMIN — DOCUSATE SODIUM 100 MG: 100 CAPSULE, LIQUID FILLED ORAL at 17:50

## 2019-12-01 RX ADMIN — OXYCODONE HYDROCHLORIDE 10 MG: 10 TABLET ORAL at 20:56

## 2019-12-01 RX ADMIN — DOCUSATE SODIUM 100 MG: 100 CAPSULE, LIQUID FILLED ORAL at 08:39

## 2019-12-01 RX ADMIN — HYDROMORPHONE HYDROCHLORIDE 0.5 MG: 1 INJECTION, SOLUTION INTRAMUSCULAR; INTRAVENOUS; SUBCUTANEOUS at 12:45

## 2019-12-01 RX ADMIN — DIAZEPAM 2.5 MG: 5 TABLET ORAL at 16:42

## 2019-12-01 RX ADMIN — SENNOSIDES 8.6 MG: 8.6 TABLET, FILM COATED ORAL at 21:03

## 2019-12-01 RX ADMIN — ACETAMINOPHEN 975 MG: 325 TABLET ORAL at 21:03

## 2019-12-01 RX ADMIN — ACETAMINOPHEN 975 MG: 325 TABLET ORAL at 05:39

## 2019-12-01 RX ADMIN — HYDROMORPHONE HYDROCHLORIDE 0.5 MG: 1 INJECTION, SOLUTION INTRAMUSCULAR; INTRAVENOUS; SUBCUTANEOUS at 09:13

## 2019-12-01 RX ADMIN — PREDNISONE 10 MG: 10 TABLET ORAL at 08:39

## 2019-12-01 RX ADMIN — METHOCARBAMOL TABLETS 500 MG: 500 TABLET, COATED ORAL at 05:39

## 2019-12-01 RX ADMIN — OXYCODONE HYDROCHLORIDE 10 MG: 10 TABLET ORAL at 16:42

## 2019-12-01 RX ADMIN — ACETAMINOPHEN 975 MG: 325 TABLET ORAL at 13:33

## 2019-12-01 RX ADMIN — GABAPENTIN 100 MG: 100 CAPSULE ORAL at 16:42

## 2019-12-01 RX ADMIN — HEPARIN SODIUM 5000 UNITS: 5000 INJECTION INTRAVENOUS; SUBCUTANEOUS at 08:40

## 2019-12-01 NOTE — PROGRESS NOTES
Progress Note - Jay Ray 1950, 81 y o  female MRN: [id number]    Unit/Bed#: [facility] 354-75 Encounter: [id number]    Primary Care Provider: Silviano Blue MD   Date and time admitted to hospital: 11/23/2019  4:25 PM        Fall down stairs  Assessment & Plan  PT/OT - home with family support    Multiple fractures of ribs, left side, initial encounter for closed fracture  Assessment & Plan  Continue IS, pain management    * Anterior shoulder dislocation, right, initial encounter  Assessment & Plan  Ortho following, nonweightbearing in sling  Bedside rounds completed with nurse yes  Disposition: Home with family support      SUBJECTIVE:    Subjective: Patient with ongoing left sided chest pain  No SOB   Very uncomfortable with being discharged home today      OBJECTIVE:     Meds/Allergies     Current Facility-Administered Medications:     acetaminophen (TYLENOL) tablet 975 mg, 975 mg, Oral, Q8H [facility], Klaus Banuelos PA-C, 975 mg at 12/01/19 0539    bisacodyl (DULCOLAX) rectal suppository 10 mg, 10 mg, Rectal, Daily PRN, Klaus Banuelos PA-C, 10 mg at 11/28/19 2028    docusate sodium (COLACE) capsule 100 mg, 100 mg, Oral, BID, Klaus Banuelos PA-C, 100 mg at 11/30/19 1734    gabapentin (NEURONTIN) capsule 100 mg, 100 mg, Oral, BID, Marbella Backers, PA-C, 100 mg at 11/30/19 1552    gabapentin (NEURONTIN) capsule 300 mg, 300 mg, Oral, HS, Klaus Banuelos PA-C, 300 mg at 11/30/19 2113    heparin (porcine) subcutaneous injection 5,000 Units, 5,000 Units, Subcutaneous, Q12H [facility], Klaus Banuelos PA-C, 5,000 Units at 11/30/19 2113    HYDROmorphone (DILAUDID) injection 0 5 mg, 0 5 mg, Intravenous, Q3H PRN, Klaus Banuelos PA-C, 0 5 mg at 11/30/19 2120    lidocaine (LIDODERM) 5 % patch 1 patch, 1 patch, Topical, Daily, Klaus Banuelos PA-C, 1 patch at 11/30/19 0947    methocarbamol (ROBAXIN) tablet 500 mg, 500 mg, Oral, Q6H [facility], [facility] EDISON Banuelos, 500 mg at 12/01/19 0539    ondansetron (ZOFRAN) injection 4 mg, 4 mg, Intravenous, Q6H PRN, Luan Parada, PA-C, 4 mg at 11/28/19 1022    oxyCODONE (ROXICODONE) immediate release tablet 10 mg, 10 mg, Oral, Q4H PRN, Glenna Ruelas, DO, 10 mg at 11/30/19 1957    oxyCODONE (ROXICODONE) IR tablet 5 mg, 5 mg, Oral, Q4H PRN, Glenna Ruleas, DO, 5 mg at 12/01/19 0539    polyethylene glycol (MIRALAX) packet 17 g, 17 g, Oral, Daily, ELIZA BowerNP, 17 g at 11/29/19 0930    predniSONE tablet 10 mg, 10 mg, Oral, Daily, Luan Parada PA-C, 10 mg at 11/30/19 4208    senna (SENOKOT) tablet 8 6 mg, 1 tablet, Oral, HS, Luan Parada PA-C, 8 6 mg at 11/30/19 2113    sertraline (ZOLOFT) tablet 100 mg, 100 mg, Oral, Daily, Luan Parada PA-C, 100 mg at 11/30/19 0947     Vitals:   Vitals:    12/01/19 0348   BP: 123/71   Pulse: 82   Resp: 17   Temp: 99 1 °F (37 3 °C)   SpO2: 95%       Intake/Output:  I/O       11/29 0701 - 11/30 0700 11/30 0701 - 12/01 0700 12/01 0701 - 12/02 0700    P  O  360 680     I V  (mL/kg) 149 2 (1 9)      Total Intake(mL/kg) 509 2 (6 3) 680 (8 5)     Urine (mL/kg/hr) 875 (0 5) 350 (0 2)     Total Output 875 350     Net -365 9 +330            Unmeasured Urine Occurrence 3 x 4 x     Unmeasured Stool Occurrence  3 x            Nutrition/GI Proph/Bowel Reg: Regular/senna/colace    Physical Exam:  GEN: NAD  HEENT: MMM  CV: RRR  Some left sided chest wall tenderness  Lung: Normal effort  Ab: Soft, NT/ND  Extrem: No CCE  Neuro: A+Ox3      Invasive Devices     Peripheral Intravenous Line            Peripheral IV 11/28/19 Left;Ventral (anterior) Forearm 2 days                 Lab Results: Results: I have personally reviewed pertinent reports  Imaging/EKG Studies: Results: I have personally reviewed pertinent reports      Other Studies: n/a  VTE Prophylaxis: Heparin

## 2019-12-01 NOTE — PLAN OF CARE
Problem: Prexisting or High Potential for Compromised Skin Integrity  Goal: Skin integrity is maintained or improved  Description  INTERVENTIONS:  - Identify patients at risk for skin breakdown  - Assess and monitor skin integrity  - Assess and monitor nutrition and hydration status  - Monitor labs   - Assess for incontinence   - Turn and reposition patient  - Assist with mobility/ambulation  - Relieve pressure over bony prominences  - Avoid friction and shearing  - Provide appropriate hygiene as needed including keeping skin clean and dry  - Evaluate need for skin moisturizer/barrier cream  - Collaborate with interdisciplinary team   - Patient/family teaching  - Consider wound care consult   Outcome: Progressing     Problem: Potential for Falls  Goal: Patient will remain free of falls  Description  INTERVENTIONS:  - Assess patient frequently for physical needs  -  Identify cognitive and physical deficits and behaviors that affect risk of falls  -  [place] fall precautions as indicated by assessment   - Educate patient/family on patient safety including physical limitations  - Instruct patient to call for assistance with activity based on assessment  - Modify environment to reduce risk of injury  - Consider OT/PT consult to assist with strengthening/mobility  Outcome: Progressing     Problem: NEUROSENSORY - ADULT  Goal: Achieves maximal functionality and self care  Description  INTERVENTIONS  - Monitor swallowing and airway patency with patient fatigue and changes in neurological status  - Encourage and assist patient to increase activity and self care     - Encourage visually impaired, hearing impaired and aphasic patients to use assistive/communication devices  Outcome: Progressing     Problem: CARDIOVASCULAR - ADULT  Goal: Maintains optimal cardiac output and hemodynamic stability  Description  INTERVENTIONS:  - Monitor I/O, vital signs and rhythm  - Monitor for S/S and trends of decreased cardiac output  - Administer and titrate ordered vasoactive medications to optimize hemodynamic stability  - Assess quality of pulses, skin color and temperature  - Assess for signs of decreased coronary artery perfusion  - Instruct patient to report change in severity of symptoms  Outcome: Progressing     Problem: RESPIRATORY - ADULT  Goal: Achieves optimal ventilation and oxygenation  Description  INTERVENTIONS:  - Assess for changes in respiratory status  - Assess for changes in mentation and behavior  - Position to facilitate oxygenation and minimize respiratory effort  - Oxygen administered by appropriate delivery if ordered  - Initiate smoking cessation education as indicated  - Encourage broncho-pulmonary hygiene including cough, deep breathe, Incentive Spirometry  - Assess the need for suctioning and aspirate as needed  - Assess and instruct to report SOB or any respiratory difficulty  - Respiratory Therapy support as indicated  Outcome: Progressing     Problem: GENITOURINARY - ADULT  Goal: Maintains or returns to baseline urinary function  Description  INTERVENTIONS:  - Assess urinary function  - Encourage oral fluids to ensure adequate hydration if ordered  - Administer IV fluids as ordered to ensure adequate hydration  - Administer ordered medications as needed  - Offer frequent toileting  - Follow urinary retention protocol if ordered  Outcome: Progressing     Problem: METABOLIC, FLUID AND ELECTROLYTES - ADULT  Goal: Electrolytes maintained within normal limits  Description  INTERVENTIONS:  - Monitor labs and assess patient for signs and symptoms of electrolyte imbalances  - Administer electrolyte replacement as ordered  - Monitor response to electrolyte replacements, including repeat lab results as appropriate  - Instruct patient on fluid and nutrition as appropriate  Outcome: Progressing  Goal: Fluid balance maintained  Description  INTERVENTIONS:  - Monitor labs   - Monitor I/O and WT  - Instruct patient on fluid and nutrition as appropriate  - Assess for signs & symptoms of volume excess or deficit  Outcome: Progressing     Problem: SKIN/TISSUE INTEGRITY - ADULT  Goal: Skin integrity remains intact  Description  INTERVENTIONS  - Identify patients at risk for skin breakdown  - Assess and monitor skin integrity  - Assess and monitor nutrition and hydration status  - Monitor labs (i e  albumin)  - Assess for incontinence   - Turn and reposition patient  - Assist with mobility/ambulation  - Relieve pressure over bony prominences  - Avoid friction and shearing  - Provide appropriate hygiene as needed including keeping skin clean and dry  - Evaluate need for skin moisturizer/barrier cream  - Collaborate with interdisciplinary team (i e  Nutrition, Rehabilitation, etc )   - Patient/family teaching  Outcome: Progressing     Problem: HEMATOLOGIC - ADULT  Goal: Maintains hematologic stability  Description  INTERVENTIONS  - Assess for signs and symptoms of bleeding or hemorrhage  - Monitor labs  - Administer supportive blood products/factors as ordered and appropriate  Outcome: Progressing     Problem: MUSCULOSKELETAL - ADULT  Goal: Maintain or return mobility to safest level of function  Description  INTERVENTIONS:  - Assess patient's ability to carry out ADLs; assess patient's baseline for ADL function and identify physical deficits which impact ability to perform ADLs (bathing, care of mouth/teeth, toileting, grooming, dressing, etc )  - Assess/evaluate cause of self-care deficits   - Assess range of motion  - Assess patient's mobility  - Assess patient's need for assistive devices and provide as appropriate  - Encourage maximum independence but intervene and supervise when necessary  - Involve family in performance of ADLs  - Assess for home care needs following discharge   - Consider OT consult to assist with ADL evaluation and planning for discharge  - Provide patient education as appropriate  Outcome: Progressing     Problem: Nutrition/Hydration-ADULT  Goal: Nutrient/Hydration intake appropriate for improving, restoring or maintaining nutritional needs  Description  Monitor and assess patient's nutrition/hydration status for malnutrition  Collaborate with interdisciplinary team and initiate plan and interventions as ordered  Monitor patient's weight and dietary intake as ordered or per policy  Utilize nutrition screening tool and intervene as necessary  Determine patient's food preferences and provide high-protein, high-caloric foods as appropriate       INTERVENTIONS:  - Monitor oral intake, urinary output, labs, and treatment plans  - Assess nutrition and hydration status and recommend course of action  - Evaluate amount of meals eaten  - Assist patient with eating if necessary   - Allow adequate time for meals  - Recommend/ encourage appropriate diets, oral nutritional supplements, and vitamin/mineral supplements  - Order, calculate, and assess calorie counts as needed  - Recommend, monitor, and adjust tube feedings and TPN/PPN based on assessed needs  - Assess need for intravenous fluids  - Provide specific nutrition/hydration education as appropriate  - Include patient/family/caregiver in decisions related to nutrition  Outcome: Progressing     Problem: PAIN - ADULT  Goal: Verbalizes/displays adequate comfort level or baseline comfort level  Description  Interventions:  - Encourage patient to monitor pain and request assistance  - Assess pain using appropriate pain scale  - Administer analgesics based on type and severity of pain and evaluate response  - Implement non-pharmacological measures as appropriate and evaluate response  - Consider cultural and social influences on pain and pain management  - Notify physician/advanced practitioner if interventions unsuccessful or patient reports new pain  Outcome: Progressing     Problem: INFECTION - ADULT  Goal: Absence or prevention of progression during hospitalization  Description  INTERVENTIONS:  - Assess and monitor for signs and symptoms of infection  - Monitor lab/diagnostic results  - Monitor all insertion sites, i e  indwelling lines, tubes, and drains  - Monitor endotracheal if appropriate and nasal secretions for changes in amount and color  - [place] appropriate cooling/warming therapies per order  - Administer medications as ordered  - Instruct and encourage patient and family to use good hand hygiene technique  - Identify and instruct in appropriate isolation precautions for identified infection/condition  Outcome: Progressing     Problem: SAFETY ADULT  Goal: Maintain or return to baseline ADL function  Description  INTERVENTIONS:  -  Assess patient's ability to carry out ADLs; assess patient's baseline for ADL function and identify physical deficits which impact ability to perform ADLs (bathing, care of mouth/teeth, toileting, grooming, dressing, etc )  - Assess/evaluate cause of self-care deficits   - Assess range of motion  - Assess patient's mobility; develop plan if impaired  - Assess patient's need for assistive devices and provide as appropriate  - Encourage maximum independence but intervene and supervise when necessary  - Involve family in performance of ADLs  - Assess for home care needs following discharge   - Consider OT consult to assist with ADL evaluation and planning for discharge  - Provide patient education as appropriate  Outcome: Progressing  Goal: Maintain or return mobility status to optimal level  Description  INTERVENTIONS:  - Assess patient's baseline mobility status (ambulation, transfers, stairs, etc )    - Identify cognitive and physical deficits and behaviors that affect mobility  - Identify mobility aids required to assist with transfers and/or ambulation (gait belt, sit-to-stand, lift, walker, cane, etc )  - [place] fall precautions as indicated by assessment  - Record patient progress and toleration of activity level on Mobility SBAR; progress patient to next Phase/Stage  - Instruct patient to call for assistance with activity based on assessment  - Consider rehabilitation consult to assist with strengthening/weightbearing, etc   Outcome: Progressing     Problem: DISCHARGE PLANNING  Goal: Discharge to home or other facility with appropriate resources  Description  INTERVENTIONS:  - Identify barriers to discharge w/patient and caregiver  - Arrange for needed discharge resources and transportation as appropriate  - Identify discharge learning needs (meds, wound care, etc )  - Arrange for interpretive services to assist at discharge as needed  - Refer to Case Management Department for coordinating discharge planning if the patient needs post-hospital services based on physician/advanced practitioner order or complex needs related to functional status, cognitive ability, or social support system  Outcome: Progressing     Problem: Knowledge Deficit  Goal: Patient/family/caregiver demonstrates understanding of disease process, treatment plan, medications, and discharge instructions  Description  Complete learning assessment and assess knowledge base    Interventions:  - Provide teaching at level of understanding  - Provide teaching via preferred learning methods  Outcome: Progressing     Problem: COPING  Goal: Pt/Family able to verbalize concerns and demonstrate effective coping strategies  Description  INTERVENTIONS:  - Assist patient/family to identify coping skills, available support systems and cultural and spiritual values  - Provide emotional support, including active listening and acknowledgement of concerns of patient and caregivers  - Reduce environmental stimuli, as able  - Provide patient education  - Assess for spiritual pain/suffering and initiate spiritual care, including notification of Pastoral Care or matt based community as needed  - Assess effectiveness of coping strategies  Outcome: Progressing  Goal: Will report anxiety at manageable levels  Description  INTERVENTIONS:  - Administer medication as ordered  - Teach and encourage coping skills  - Provide emotional support  - Assess patient/family for anxiety and ability to cope  Outcome: Progressing

## 2019-12-01 NOTE — RESTORATIVE TECHNICIAN NOTE
Restorative Specialist Mobility Note       Activity: Ambulate in hale, Chair     Assistive Device: Four point cane

## 2019-12-02 VITALS
OXYGEN SATURATION: 93 % | DIASTOLIC BLOOD PRESSURE: 66 MMHG | HEIGHT: 62 IN | HEART RATE: 91 BPM | RESPIRATION RATE: 19 BRPM | SYSTOLIC BLOOD PRESSURE: 105 MMHG | WEIGHT: 193.1 LBS | BODY MASS INDEX: 35.53 KG/M2 | TEMPERATURE: 98 F

## 2019-12-02 PROBLEM — R06.89 ACUTE RESPIRATORY INSUFFICIENCY: Status: ACTIVE | Noted: 2019-12-02

## 2019-12-02 PROBLEM — E87.6 HYPOKALEMIA: Status: RESOLVED | Noted: 2019-11-27 | Resolved: 2019-12-02

## 2019-12-02 PROBLEM — S01.01XA OCCIPITAL SCALP LACERATION: Status: ACTIVE | Noted: 2019-12-02

## 2019-12-02 PROBLEM — J96.01 ACUTE RESPIRATORY FAILURE WITH HYPOXIA (HCC): Status: RESOLVED | Noted: 2019-11-25 | Resolved: 2019-12-02

## 2019-12-02 PROCEDURE — 97116 GAIT TRAINING THERAPY: CPT

## 2019-12-02 PROCEDURE — 99238 HOSP IP/OBS DSCHRG MGMT 30/<: CPT | Performed by: PHYSICIAN ASSISTANT

## 2019-12-02 PROCEDURE — 94668 MNPJ CHEST WALL SBSQ: CPT

## 2019-12-02 RX ORDER — LIDOCAINE 50 MG/G
1 PATCH TOPICAL DAILY
Refills: 0
Start: 2019-12-03

## 2019-12-02 RX ORDER — DOCUSATE SODIUM 100 MG/1
100 CAPSULE, LIQUID FILLED ORAL 2 TIMES DAILY
Qty: 10 CAPSULE | Refills: 0
Start: 2019-12-02

## 2019-12-02 RX ORDER — GABAPENTIN 300 MG/1
300 CAPSULE ORAL
Qty: 30 CAPSULE | Refills: 0 | Status: SHIPPED | OUTPATIENT
Start: 2019-12-02

## 2019-12-02 RX ORDER — DIAZEPAM 5 MG/1
2.5 TABLET ORAL EVERY 8 HOURS
Qty: 15 TABLET | Refills: 0 | Status: SHIPPED | OUTPATIENT
Start: 2019-12-02 | End: 2019-12-12 | Stop reason: SDUPTHER

## 2019-12-02 RX ORDER — ACETAMINOPHEN 325 MG/1
975 TABLET ORAL EVERY 8 HOURS SCHEDULED
Qty: 30 TABLET | Refills: 0
Start: 2019-12-02

## 2019-12-02 RX ORDER — POLYETHYLENE GLYCOL 3350 17 G/17G
17 POWDER, FOR SOLUTION ORAL DAILY
Qty: 14 EACH | Refills: 0
Start: 2019-12-03

## 2019-12-02 RX ORDER — SENNOSIDES 8.6 MG
1 TABLET ORAL
Qty: 120 EACH | Refills: 0
Start: 2019-12-02

## 2019-12-02 RX ORDER — GABAPENTIN 100 MG/1
100 CAPSULE ORAL 2 TIMES DAILY
Qty: 60 CAPSULE | Refills: 0 | Status: SHIPPED | OUTPATIENT
Start: 2019-12-03 | End: 2019-12-12 | Stop reason: SDUPTHER

## 2019-12-02 RX ORDER — SERTRALINE HYDROCHLORIDE 100 MG/1
100 TABLET, FILM COATED ORAL DAILY
Refills: 0
Start: 2019-12-03

## 2019-12-02 RX ORDER — OXYCODONE HYDROCHLORIDE 5 MG/1
5 TABLET ORAL EVERY 4 HOURS PRN
Qty: 30 TABLET | Refills: 0 | Status: SHIPPED | OUTPATIENT
Start: 2019-12-02 | End: 2019-12-10

## 2019-12-02 RX ORDER — LANOLIN ALCOHOL/MO/W.PET/CERES
3 CREAM (GRAM) TOPICAL
Refills: 0
Start: 2019-12-02

## 2019-12-02 RX ORDER — PREDNISONE 10 MG/1
10 TABLET ORAL DAILY
Refills: 0
Start: 2019-12-03

## 2019-12-02 RX ADMIN — ACETAMINOPHEN 975 MG: 325 TABLET ORAL at 15:14

## 2019-12-02 RX ADMIN — ACETAMINOPHEN 975 MG: 325 TABLET ORAL at 05:29

## 2019-12-02 RX ADMIN — GABAPENTIN 100 MG: 100 CAPSULE ORAL at 09:00

## 2019-12-02 RX ADMIN — LIDOCAINE 1 PATCH: 50 PATCH CUTANEOUS at 09:01

## 2019-12-02 RX ADMIN — DIAZEPAM 2.5 MG: 5 TABLET ORAL at 01:11

## 2019-12-02 RX ADMIN — HEPARIN SODIUM 5000 UNITS: 5000 INJECTION INTRAVENOUS; SUBCUTANEOUS at 09:01

## 2019-12-02 RX ADMIN — DIAZEPAM 2.5 MG: 5 TABLET ORAL at 09:01

## 2019-12-02 RX ADMIN — GABAPENTIN 100 MG: 100 CAPSULE ORAL at 15:15

## 2019-12-02 RX ADMIN — PREDNISONE 10 MG: 10 TABLET ORAL at 09:00

## 2019-12-02 RX ADMIN — DIAZEPAM 2.5 MG: 5 TABLET ORAL at 16:59

## 2019-12-02 RX ADMIN — SERTRALINE HYDROCHLORIDE 100 MG: 100 TABLET ORAL at 09:00

## 2019-12-02 NOTE — DISCHARGE SUMMARY
Discharge- [facility] 1950, 81 y o  female MRN: [id number]    Unit/Bed#: [facility] 625-85 Encounter: [id number]    Primary Care Provider: Khushbu Clifford MD   Date and time admitted to hospital: 11/23/2019  4:25 PM        Multiple fractures of ribs, left side, initial encounter for closed fracture  Assessment & Plan  -still requiring 1L NC  -Continue IS  -Dilaudid d/c'ed today  -pain control with Tylenol 975mg Q8, Gabapentin 100mg BID and 300mg QHS      * Anterior shoulder dislocation, right, initial encounter  Assessment & Plan  -Ortho following  -nonweightbearing in sling  Fall down stairs  Assessment & Plan  PT/OT - home with family support    Acute pain due to trauma  Assessment & Plan  - epidural removed 11/29  -oral analgesia as recommended by APS    Right foot pain  Assessment & Plan  - Xray neg for fracture  -continue current pain regimen    Occipital scalp laceration  Assessment & Plan  -warm soap and water rinses  -keep clean and dry    Hypokalemiaresolved as of 12/2/2019  Assessment & Plan  Repleted    Acute respiratory failure with hypoxia (HCC)resolved as of 12/2/2019  Assessment & Plan  - 02 to be weaned as able  - continue IS  Patient is refusing to perform IS this am due to pain                Resolved Problems  Date Reviewed: 12/2/2019          Resolved    Acute respiratory failure with hypoxia ([facility] [address] ) 12/2/2019     Resolved by  Edie Swift PA-C    Leukocytosis 11/27/2019     Resolved by  Kerry Paris DO    Hypokalemia 12/2/2019     Resolved by  Edie Swift PA-C          Admission Date:   Admission Orders (From admission, onward)     Ordered        11/23/19 1849  Inpatient Admission  Once                     Admitting Diagnosis: Multiple injuries [T07  XXXA]  Anterior shoulder dislocation, left, initial encounter [S43 015A]  Anterior shoulder dislocation, right, initial encounter [S43 014A]    HPI: "[facility] is a 81 y o  female who presents as a level B trauma status post fall down approximately 8 steps  Patient presented with pain complaints to right shoulder and left flank  Patient also endorsed hitting head, + LOC  Patient denies any history of blood thinners  Imaging revealed anterior dislocation of the right shoulder and multiple left-sided rib fractures  "  -per trauma resident Delia Mcclellan MD    Procedures Performed: No orders of the defined types were placed in this encounter  Summary of Hospital Course: On initial evaluation imagine showed:  11/23- CTH: large posterior scalp hematoma and laceration without any adjacent skull fx or hemorrhage  11/23-  CT C-spine negative  11/23- CT Chest: multiple L-sided rib fxx (lateral and posterior 8th and 9th ribs, shaft with displacement of posterior L 10th rib) with minimal subpleural hematoma as well as an anterior R subcoracoid humeral dislocation with associated Hill-Sachs and osseous Bankart fractures    Patient was admitted to the critical care unit on 11/23  Bedside reduction of anterior R shoulder was performed at bedside by ortho  Ortho recommended NWB of R shoulder  Patient had declined respiratory status 2/2 pain in setting of multiple rib fractures requiring HFNC  On 11/25 epidural was placed by APS to obtain optimal pain control  On 11/26 patient was transferred to the trauma floor  On 11/29 epidural was d/c'ed  Patient was cleared by PT for discharge to home with family support  HPI/Subjective day of discharge:  "I am feeling good " Patient states she feels well and as though she is ready for discharge  She states she is able to walk up and down halls with PT  She denies pain at this time  She has been urinating normally and has normal BM  She does note serosanguinous drainage from a small occipital laceration  Day of Discharge PE:  Physical Exam   General: WDWN female sitting comfortably in bed in NAD  Pleasant and cooperative with exam   Neuro: GCS 15, alert and oriented x 3     HEENT: Small 1cm lac on left side of occiput with serosanguinous discharge and granulation tissue  PERRLA  EOMs intact  Neck supple  CV: RRR no MRG  Pulm: CTA b/l  Abd: Soft, non-tender, non-distended  BS x 4  Musk: Full ROM in all extremities except R shoulder which is in sling  Peripheral vascular: 2+ DP/PT pulses        Significant Findings, Care, Treatment and Services Provided:   11/23- CTH: large posterior scalp hematoma and laceration without any adjacent skull fx or hemorrhage  11/23-  CT C-spine negative  11/23- CT Chest: multiple L-sided rib fxx (lateral and posterior 8th and 9th ribs, shaft with displacement of posterior L 10th rib) with minimal subpleural hematoma as well as an anterior R subcoracoid humeral dislocation with associated Hill-Sachs and osseouys Bankart fractures  11/23: bedside reduction of anterior R shoulder dislocation by ortho  11/23 CT R shoulder" interval reduction of anterior dislocation, Hill-Sachs and osseous Bankart fractures again seen, Bankart fragment displaced inferomedially  11/25: Epidural placed by APS  11/29: Epidural d/c'ed by APS      Complications: None  Condition at Discharge: good         Discharge instructions/Information to patient and family:   See after visit summary for information provided to patient and family  Provisions for Follow-Up Care:  See after visit summary for information related to follow-up care and any pertinent home health orders  PCP: Israel Danielson MD    Disposition: Home    Planned Readmission: No    Discharge Statement   I spent 25 minutes discharging the patient  This time was spent on the day of discharge  I had direct contact with the patient on the day of discharge  Additional documentation is required if more than 30 minutes were spent on discharge  Discharge Medications:  See after visit summary for reconciled discharge medications provided to patient and family

## 2019-12-02 NOTE — RESPIRATORY THERAPY NOTE
Home Oxygen Qualifying Test       Patient name: Robbin Sanchez        : 1950   Date of Test:  2019  Diagnosis:      Home Oxygen Test:    **Medicare Guidelines require item(s) 1-5 on all ambulatory patients or 1 and 2 on non-ambulatory patients  1   Baseline SPO2 on Room Air at rest 87 %  2   SPO2 during exercise on Room Air 83 %  During exercise monitor SpO2  If SPO2 increases >=89% with ambulation do not add supplemental             oxygen  If <= 88% on room air add O2 via NC and titrate patient  Patient must be ambulated with O2 and titrated to > 88% with exertion  3   SPO2 on Oxygen at rest  %  lpm     4   SPO2 during exercise on Oxygen 89% liter flow of 2 lpm     5   Exercise performed:   Pt walked about 360ft  Was a bit SOB but walked well  Pt does qualify for home o2          [x]  Supplemental Home Oxygen is indicated  []  Client does not qualify for home oxygen      *  Respiratory Additional Notes-     Tova Sylvester, RT

## 2019-12-02 NOTE — ASSESSMENT & PLAN NOTE
-still requiring 1L NC  -Continue IS  -Dilaudid d/c'ed today  -pain control with Tylenol 975mg Q8, Gabapentin 100mg BID and 300mg QHS

## 2019-12-02 NOTE — UTILIZATION REVIEW
Initial Clinical Review         Admission: Date/Time/Statement: Inpatient Admission Orders (From admission, onward)              Ordered          11/23/19 1849   Inpatient Admission  Once                             Orders Placed This Encounter   Procedures    Inpatient Admission       Standing Status:   Standing       Number of Occurrences:   1       Order Specific Question:   Admitting Physician       Answer:   Olinda Dys [[id number]]       Order Specific Question:   Level of Care       Answer:   Critical Care [15]       Order Specific Question:   Estimated length of stay       Answer:   More than 2 Midnights       Order Specific Question:   Certification       Answer:   I certify that inpatient services are medically necessary for this patient for a duration of greater than two midnights  See H&P and MD Progress Notes for additional information about the patient's course of treatment                 ED Arrival Information      Expected Arrival Acuity Means of Arrival Escorted By Service Admission Type     - 11/23/2019 16:25 Immediate Ambulance [facility] ([address]) [address]       Chief Complaint   Patient presents with    Fall       see trauma flow sheet      Assessment/Plan: 81 y o  female with no reported PMH/PSH who presents to ED as a level B trauma s/p fall down ~8 concrete steps   Patient presented with c/o pain to right shoulder and left flank   Patient also endorsed hitting head, + LOC   Patient denies any history of blood thinners   Imaging revealed anterior dislocation of the R shoulder and multiple left-sided rib fractures  Admit inpatient to ICU    Ortho consult, cont pox, O2 as needed currently on 3 lpm nc, IS q 1h while awake, neuro checks q4h, OOB 3x/day, I/O q2h, SCD's, up with assist, nursing dysphagia diet prior to po --> reg diet, PT/OT evals, consult acute pain management for c/o pain 10/10 in her R shoulder and L chest  Reduction to R arm done s/p sedation      Ortho consult 11/23 ---Right glenohumeral anterior dislocation   Patient neurologically and vascularly intact distally   This was a closed injury   Plan: NWB RUE, sling for comfort; analgesics, prn, SCD's for DVT ppx     11/24 --- pt with poor effort with IS d/t pain, now on HFNC @ 55L, FiO2 85%              ED Triage Vitals   Temperature Pulse Respirations Blood Pressure SpO2   11/23/19 1630 11/23/19 1630 11/23/19 1630 11/23/19 1630 11/23/19 1630   97 5 °F (36 4 °C) 79 (!) 24 122/67 97 %       Temp Source Heart Rate Source Patient Position - Orthostatic VS BP Location FiO2 (%)   11/23/19 1630 11/23/19 1630 11/23/19 1630 11/23/19 1825 --   Oral Monitor Lying Left arm         Pain Score           11/23/19 1825           No Pain                Wt Readings from Last 1 Encounters:   11/24/19 79 2 kg (174 lb 9 7 oz)      Additional Vital Signs:   Date/Time   Temp   Pulse   Resp   BP   MAP (mmHg)   SpO2   O2 Device   Patient Position - Orthostatic VS   11/24/19 1636                  96 %         11/24/19 1200      100   19   103/67   81   94 %         11/24/19 1100      96   16   111/68   78   92 %         11/24/19 1000      100   22   123/66   84   94 %         11/24/19 0600      100   43Abnormal          89 %Abnormal          11/24/19 0300      98   23Abnormal    114/68   77   87 %Abnormal          11/24/19 0200      100   18   116/70   86   91 %         11/24/19 0100      98   20   108/66   82   89 %Abnormal          11/24/19 0000   98 8 °F (37 1 °C)   96   18   129/76   93   92 %   Nasal cannula   Sitting   11/23/19 2300      104   24Abnormal    121/77   92   92 %         11/23/19 2200      102   30Abnormal    134/78   101   94 %         11/23/19 2100      102   38Abnormal    131/75   90   93 %         11/23/19 2000   98 6 °F (37 °C)   90   33Abnormal    110/62   75   95 %         11/23/19 1948   98 6 °F (37 °C)   90   24Abnormal    92/68   84   94 %   Nasal cannula   Lying   11/23/19 18:30:17            160/115Abnormal                11/23/19 1830      78   16   172/92Abnormal          Nasal cannula   Lying   11/23/19 18:25:17            172/92Abnormal                11/23/19 1825      75   16   158/90      96 %   Nasal cannula   Lying   11/23/19 1715      76   20   175/72Abnormal    114   96 %         11/23/19 1700      82   18   181/80Abnormal    115   97 %         11/23/19 1645      77   18   189/85Abnormal       98 %         11/23/19 1635      74   18   176/91Abnormal       97 %      Lying   11/23/19 1630   97 5 °F (36 4 °C)   79   24Abnormal    122/67      97 %      Lying         Pertinent Labs/Diagnostic Test Results:         Results from last 7 days   Lab Units 11/23/19  1641 11/23/19  1632   WBC Thousand/uL 9 81  --    HEMOGLOBIN g/dL 12 4  --    I STAT HEMOGLOBIN g/dl  --  12 9   HEMATOCRIT % 39 4  --    HEMATOCRIT, ISTAT %  --  38   PLATELETS Thousands/uL 265  --    NEUTROS ABS Thousands/µL 6 85  --             Results from last 7 days   Lab Units 11/24/19  0536 11/23/19  1632   SODIUM mmol/L 141  --    POTASSIUM mmol/L 4 3  --    CHLORIDE mmol/L 110*  --    CO2 mmol/L 24  --    CO2, I-STAT mmol/L  --  25   ANION GAP mmol/L 7  --    BUN mg/dL 23  --    CREATININE mg/dL 0 98  --    EGFR ml/min/1 73sq m 60  --    CALCIUM mg/dL 8 3  --    CALCIUM, IONIZED mmol/L 1 11*  --    CALCIUM, IONIZED, ISTAT mmol/L  --  1 13   MAGNESIUM mg/dL 2 1  --    PHOSPHORUS mg/dL 4 3*  --            Results from last 7 days   Lab Units 11/24/19  0536   GLUCOSE RANDOM mg/dL 129           Results from last 7 days   Lab Units 11/23/19  1632   PH, CODY I-STAT   7 379   PCO2, CODY ISTAT mm HG 40 7*   PO2, CODY ISTAT mm HG 20 0*   HCO3, CODY ISTAT mmol/L 24 0   I STAT BASE EXC mmol/L -1   I STAT O2 SAT % 30*           Results from last 7 days   Lab Units 11/23/19  1641   PROTIME seconds 12 6   INR   0 98   PTT seconds 22*      ED Treatment:            Medication Administration from 11/23/2019 1618 to 11/23/2019 1947        Date/Time Order Dose Route Action       11/23/2019 1630 fentanyl citrate (PF) 100 MCG/2ML 50 mcg Intravenous Given       11/23/2019 1717 lidocaine (PF) (XYLOCAINE-MPF) 1 % injection 20 mL 20 mL Infiltration Given       11/23/2019 1701 fentanyl citrate (PF) 100 MCG/2ML 50 mcg Intravenous Given       11/23/2019 1806 HYDROmorphone (DILAUDID) injection 0 5 mg 0 5 mg Intravenous Given       11/23/2019 1825 etomidate (AMIDATE) 2 mg/mL injection 10 mg 10 mg Intravenous Given      Medical History   History reviewed  No pertinent past medical history  Present on Admission:  **None**        Admitting Diagnosis: Multiple injuries [T07  XXXA]  Anterior shoulder dislocation, left, initial encounter [S43 015A]  Anterior shoulder dislocation, right, initial encounter [S43 014A]  Age/Sex: 81 y o  female  Admission Orders:  Scheduled Medications:  Medications:  acetaminophen 975 mg Oral Q8H [facility]   chlorhexidine 15 mL Swish & Spit Q12H [facility]   dextrose         enoxaparin 30 mg Subcutaneous Q12H [facility]   gabapentin 300 mg Oral HS   lidocaine 1 patch Topical Daily   methocarbamol 500 mg Oral Q6H [facility]      PRN Meds:  HYDROmorphone 1 mg Intravenous Q3H PRN x 6 doses   ondansetron 4 mg Intravenous Q6H PRN  x2 doses   oxyCODONE 10 mg Oral Q4H PRN  x1 dose   oxyCODONE 5 mg Oral Q4H PRN  x3 doses         IP CONSULT TO ORTHOPEDIC SURGERY  IP CONSULT TO CASE MANAGEMENT  IP CONSULT TO ACUTE PAIN SERVICE     Network Utilization Review Department  [email or web address]  ATTENTION: Please call with any questions or concerns to [phone] and carefully listen to the prompts so that you are directed to the right person   All voicemails are confidential   Mario Kelley all requests for admission clinical reviews, approved or denied determinations and any other requests to dedicated fax number below belonging to the campus where the patient is receiving treatment    FACILITY NAME UR FAX NUMBER   ADMISSION DENIALS (Administrative/Medical Necessity) [phone]   PARENT CHILD HEALTH (Maternity/NICU/Pediatrics) [phone]   [facility] [phone]   Isaura Ferrell [phone]   [place] [address] [phone]

## 2019-12-02 NOTE — PLAN OF CARE
Problem: Prexisting or High Potential for Compromised Skin Integrity  Goal: Skin integrity is maintained or improved  Description  INTERVENTIONS:  - Identify patients at risk for skin breakdown  - Assess and monitor skin integrity  - Assess and monitor nutrition and hydration status  - Monitor labs   - Assess for incontinence   - Turn and reposition patient  - Assist with mobility/ambulation  - Relieve pressure over bony prominences  - Avoid friction and shearing  - Provide appropriate hygiene as needed including keeping skin clean and dry  - Evaluate need for skin moisturizer/barrier cream  - Collaborate with interdisciplinary team   - Patient/family teaching  - Consider wound care consult   Outcome: Progressing     Problem: Potential for Falls  Goal: Patient will remain free of falls  Description  INTERVENTIONS:  - Assess patient frequently for physical needs  -  Identify cognitive and physical deficits and behaviors that affect risk of falls  -  [place] fall precautions as indicated by assessment   - Educate patient/family on patient safety including physical limitations  - Instruct patient to call for assistance with activity based on assessment  - Modify environment to reduce risk of injury  - Consider OT/PT consult to assist with strengthening/mobility  Outcome: Progressing     Problem: NEUROSENSORY - ADULT  Goal: Achieves maximal functionality and self care  Description  INTERVENTIONS  - Monitor swallowing and airway patency with patient fatigue and changes in neurological status  - Encourage and assist patient to increase activity and self care     - Encourage visually impaired, hearing impaired and aphasic patients to use assistive/communication devices  Outcome: Progressing     Problem: SKIN/TISSUE INTEGRITY - ADULT  Goal: Skin integrity remains intact  Description  INTERVENTIONS  - Identify patients at risk for skin breakdown  - Assess and monitor skin integrity  - Assess and monitor nutrition and hydration status  - Monitor labs (i e  albumin)  - Assess for incontinence   - Turn and reposition patient  - Assist with mobility/ambulation  - Relieve pressure over bony prominences  - Avoid friction and shearing  - Provide appropriate hygiene as needed including keeping skin clean and dry  - Evaluate need for skin moisturizer/barrier cream  - Collaborate with interdisciplinary team (i e  Nutrition, Rehabilitation, etc )   - Patient/family teaching  Outcome: Progressing     Problem: MUSCULOSKELETAL - ADULT  Goal: Maintain or return mobility to safest level of function  Description  INTERVENTIONS:  - Assess patient's ability to carry out ADLs; assess patient's baseline for ADL function and identify physical deficits which impact ability to perform ADLs (bathing, care of mouth/teeth, toileting, grooming, dressing, etc )  - Assess/evaluate cause of self-care deficits   - Assess range of motion  - Assess patient's mobility  - Assess patient's need for assistive devices and provide as appropriate  - Encourage maximum independence but intervene and supervise when necessary  - Involve family in performance of ADLs  - Assess for home care needs following discharge   - Consider OT consult to assist with ADL evaluation and planning for discharge  - Provide patient education as appropriate  Outcome: Progressing     Problem: Nutrition/Hydration-ADULT  Goal: Nutrient/Hydration intake appropriate for improving, restoring or maintaining nutritional needs  Description  Monitor and assess patient's nutrition/hydration status for malnutrition  Collaborate with interdisciplinary team and initiate plan and interventions as ordered  Monitor patient's weight and dietary intake as ordered or per policy  Utilize nutrition screening tool and intervene as necessary  Determine patient's food preferences and provide high-protein, high-caloric foods as appropriate       INTERVENTIONS:  - Monitor oral intake, urinary output, labs, and treatment plans  - Assess nutrition and hydration status and recommend course of action  - Evaluate amount of meals eaten  - Assist patient with eating if necessary   - Allow adequate time for meals  - Recommend/ encourage appropriate diets, oral nutritional supplements, and vitamin/mineral supplements  - Order, calculate, and assess calorie counts as needed  - Recommend, monitor, and adjust tube feedings and TPN/PPN based on assessed needs  - Assess need for intravenous fluids  - Provide specific nutrition/hydration education as appropriate  - Include patient/family/caregiver in decisions related to nutrition  Outcome: Progressing     Problem: SAFETY ADULT  Goal: Maintain or return to baseline ADL function  Description  INTERVENTIONS:  -  Assess patient's ability to carry out ADLs; assess patient's baseline for ADL function and identify physical deficits which impact ability to perform ADLs (bathing, care of mouth/teeth, toileting, grooming, dressing, etc )  - Assess/evaluate cause of self-care deficits   - Assess range of motion  - Assess patient's mobility; develop plan if impaired  - Assess patient's need for assistive devices and provide as appropriate  - Encourage maximum independence but intervene and supervise when necessary  - Involve family in performance of ADLs  - Assess for home care needs following discharge   - Consider OT consult to assist with ADL evaluation and planning for discharge  - Provide patient education as appropriate  Outcome: Progressing  Goal: Maintain or return mobility status to optimal level  Description  INTERVENTIONS:  - Assess patient's baseline mobility status (ambulation, transfers, stairs, etc )    - Identify cognitive and physical deficits and behaviors that affect mobility  - Identify mobility aids required to assist with transfers and/or ambulation (gait belt, sit-to-stand, lift, walker, cane, etc )  - [place] fall precautions as indicated by assessment  - Record patient progress and toleration of activity level on Mobility SBAR; progress patient to next Phase/Stage  - Instruct patient to call for assistance with activity based on assessment  - Consider rehabilitation consult to assist with strengthening/weightbearing, etc   Outcome: Progressing

## 2019-12-02 NOTE — PHYSICAL THERAPY NOTE
Physical Therapy Progress Note     12/02/19 3920   Pain Assessment   Pain Assessment 0-10   Pain Score 2   Pain Location Rib cage   Pain Orientation Left   Hospital Pain Intervention(s) Repositioned; Ambulation/increased activity; Rest   Response to Interventions tolerated   Restrictions/Precautions   RUE Weight Bearing Per Order NWB   Braces or Orthoses Sling   Other Precautions WBS;Pain; Fall Risk;O2   Subjective   Subjective Pt encountered seated in recliner, pleasant and appearing to have more energy this session  Reported controlled pain & feeling better after ambulation compared to previous sessions  Transfers   Sit to Stand 5  Supervision   Additional items Assist x 1   Stand to Sit 5  Supervision   Additional items Assist x 1; Armrests; Increased time required   Ambulation/Elevation   Gait pattern Excessively slow; Short stride; Shuffling;Decreased foot clearance;Decreased R stance; Antalgic; Improper Weight shift   Gait Assistance 5  Supervision   Additional items Assist x 1   Assistive Device [facility]   Distance 30', 80' NBQC, progressing to 120' [facility]   Stair Management Assistance 4  Minimal assist   Additional items Assist x 1   Stair Management Technique One rail L;Step to pattern; Foreward   Number of Stairs 3   Balance   Static Sitting Fair +   Static Standing Fair   Ambulatory Fair -   Endurance Deficit   Endurance Deficit Yes   Endurance Deficit Description fatigue, slight SOB   Activity Tolerance   Activity Tolerance Patient tolerated treatment well;Patient limited by fatigue   Nurse [facility] [address], RN   Assessment   Prognosis Good   Problem List Decreased strength;Decreased endurance; Impaired balance;Decreased mobility; Decreased coordination;Decreased safety awareness;Pain   Assessment Pt continues to make progress this session, performing all transfers & ambulating with close supervision without LOB noted    Demonstrated improved pace & balance as well as switched to [facility] for easier management of home management  Pt requested 1 seated rest after ambulating on steps due to SOB, but SpO2 remained >93% when assessed during session  Pt &  given instructions regarding pacing while ambulating & on steps to reduce dyspnea & maintain safety   also given instructions on use of portable O2 tank that pt will return home with at this time  Pt &  verbalized understanding at this time  Continued to encourage pt to ambulate as tolerated to improve lung function & prevent complications during acute phase of healing  At this time, pt has demonstrated sufficient progress to return home with family support for all mobility to ensure safety with O2 management & supervision with therapy follow up when medically appropriate to progress to PLOF  Barriers to Discharge None   Goals   Patient Goals to go home safely   STG Expiration Date 12/09/19   PT Treatment Day 4   Plan   Treatment/Interventions Functional transfer training;LE strengthening/ROM; Therapeutic exercise;Elevations; Endurance training;Patient/family training;Equipment eval/education; Bed mobility;Gait training   Progress Progressing toward goals   PT Frequency   (3-6x/week)   Recommendation   Recommendation Home PT; Home with family support   Equipment Recommended Cane  (pt sized for & given SPC during session)   PT - OK to Discharge Yes     Almaz Cheng PTA

## 2019-12-02 NOTE — PLAN OF CARE
Problem: PHYSICAL THERAPY ADULT  Goal: Performs mobility at highest level of function for planned discharge setting  See evaluation for individualized goals  Description  Treatment/Interventions: Functional transfer training, LE strengthening/ROM, Therapeutic exercise, Endurance training, Equipment eval/education, Bed mobility, Gait training, Spoke to nursing, OT, Family  Equipment Recommended: (Continue to assess as appropriate)       See flowsheet documentation for full assessment, interventions and recommendations  Outcome: Progressing  Note:   Prognosis: Good  Problem List: Decreased strength, Decreased endurance, Impaired balance, Decreased mobility, Decreased coordination, Decreased safety awareness, Pain  Assessment: Pt continues to make progress this session, performing all transfers & ambulating with close supervision without LOB noted  Demonstrated improved pace & balance as well as switched to [facility] for easier management of home management  Pt requested 1 seated rest after ambulating on steps due to SOB, but SpO2 remained >93% when assessed during session  Pt &  given instructions regarding pacing while ambulating & on steps to reduce dyspnea & maintain safety   also given instructions on use of portable O2 tank that pt will return home with at this time  Pt &  verbalized understanding at this time  Continued to encourage pt to ambulate as tolerated to improve lung function & prevent complications during acute phase of healing  At this time, pt has demonstrated sufficient progress to return home with family support for all mobility to ensure safety with O2 management & supervision with therapy follow up when medically appropriate to progress to PLOF  Barriers to Discharge: None  Barriers to Discharge Comments: continued mobility & FILIPE  Recommendation: Home PT, Home with family support     PT - OK to Discharge: Yes    See flowsheet documentation for full assessment

## 2019-12-10 ENCOUNTER — TELEPHONE (OUTPATIENT)
Dept: SURGERY | Facility: CLINIC | Age: 69
End: 2019-12-10

## 2019-12-10 DIAGNOSIS — S22.49XA MULTIPLE RIB FRACTURES: Primary | ICD-10-CM

## 2019-12-10 RX ORDER — OXYCODONE HYDROCHLORIDE 5 MG/1
5 TABLET ORAL EVERY 4 HOURS PRN
Qty: 30 TABLET | Refills: 0 | Status: SHIPPED | OUTPATIENT
Start: 2019-12-10

## 2019-12-10 NOTE — PROGRESS NOTES
Called patient in response to questions about pain medications  At this time it appears the patient worked with physical therapy and she became quite so were afterwards  The patient's family number reported that her saturation was approximately 86%; however she quickly recovered after she was able to rest after physical therapy  Informed the patient's family at this time we will full filled the prescription for oxycodone for another 30 tablets at 5 mg  We will also order a repeat chest x-ray to be completed prior to her appointment on 12/12/2019  Currently the patient is ascertaining between 221 and a 1000 on her incentive spirometer  The patient's family member states that she does not feel short of breath or having any new chest pain  Informed the patient's family that they should bring her to the nearest ER if she has any new symptoms or she feels worse  We will otherwise re-evaluate on 12/12/2019 with a repeat chest x-ray determine further care plan  They were agreeable and understanding of the care plan

## 2019-12-10 NOTE — TELEPHONE ENCOUNTER
Pt's dtr  Called; pt is s/p rib fxs  She has a follow up appointment with us on Thurs , 12/12  She is requesting a refill of Oxycodone for a few days, until her appt; she is out of medication and tylenol and ibuprofen is not alleviating pain  Pharmacy is correct in [address] [place] [address] Rd  Pls  Advise  Jennifer Castellanos [phone]      Thank you, Mo

## 2019-12-12 ENCOUNTER — TRANSCRIBE ORDERS (OUTPATIENT)
Dept: RADIOLOGY | Facility: HOSPITAL | Age: 69
End: 2019-12-12

## 2019-12-12 ENCOUNTER — OFFICE VISIT (OUTPATIENT)
Dept: SURGERY | Facility: CLINIC | Age: 69
End: 2019-12-12
Payer: COMMERCIAL

## 2019-12-12 ENCOUNTER — HOSPITAL ENCOUNTER (OUTPATIENT)
Dept: RADIOLOGY | Facility: HOSPITAL | Age: 69
Discharge: HOME | End: 2019-12-12
Payer: COMMERCIAL

## 2019-12-12 VITALS
HEART RATE: 97 BPM | DIASTOLIC BLOOD PRESSURE: 78 MMHG | BODY MASS INDEX: 35.32 KG/M2 | HEIGHT: 62 IN | SYSTOLIC BLOOD PRESSURE: 110 MMHG | TEMPERATURE: 99.4 F

## 2019-12-12 DIAGNOSIS — S22.42XA MULTIPLE FRACTURES OF RIBS, LEFT SIDE, INITIAL ENCOUNTER FOR CLOSED FRACTURE: ICD-10-CM

## 2019-12-12 DIAGNOSIS — S22.49XA MULTIPLE RIB FRACTURES: ICD-10-CM

## 2019-12-12 DIAGNOSIS — S43.014A ANTERIOR SHOULDER DISLOCATION, RIGHT, INITIAL ENCOUNTER: ICD-10-CM

## 2019-12-12 DIAGNOSIS — R06.89 ACUTE RESPIRATORY INSUFFICIENCY: Primary | ICD-10-CM

## 2019-12-12 PROBLEM — F43.10 PTSD (POST-TRAUMATIC STRESS DISORDER): Status: ACTIVE | Noted: 2019-12-12

## 2019-12-12 PROCEDURE — 99214 OFFICE O/P EST MOD 30 MIN: CPT | Performed by: NURSE PRACTITIONER

## 2019-12-12 PROCEDURE — 71046 X-RAY EXAM CHEST 2 VIEWS: CPT

## 2019-12-12 RX ORDER — DIAZEPAM 5 MG/1
2.5 TABLET ORAL EVERY 12 HOURS PRN
Qty: 10 TABLET | Refills: 0 | Status: SHIPPED | OUTPATIENT
Start: 2019-12-12

## 2019-12-12 RX ORDER — GABAPENTIN 100 MG/1
100 CAPSULE ORAL DAILY
Qty: 20 CAPSULE | Refills: 0 | Status: SHIPPED | OUTPATIENT
Start: 2019-12-12 | End: 2019-12-26

## 2019-12-12 NOTE — ASSESSMENT & PLAN NOTE
- PTSD screening score 18  - admits to anxiety and reports this is new, starting only since the trauma (fall down stairs)  Encouraged psychiatry eval and treatment but declines   Denies any harmful ideations or thoughts, describes "just anxious because of the pain"

## 2019-12-12 NOTE — ASSESSMENT & PLAN NOTE
- CXR reviewed: areas of atelectasis bilaterally appreciated  - instructed patient on importance of compliance with IS and need to do IS every 20 minutes while awake  Pt and family report she has not been using her breathing machine even daily  - caution with use of benzodiazpenes in geriatric population and especially in conjunction with oxycodone - no evidence of respiratory depression or complication to date  Recommending discontinue valium at this time  Pt reluctant as she reports she takes it for her anxiety which is a new problem since her traumatic injury  I discussed anxiety/concerns for PTSD with patient and family  They decline psychiatry evaluation and/or treatment for her anxiety at this time  They are in agreement to wean valium off  She has been taking a whole tablet (5mg) BID most recently  Will prescribe a few more tablets with instructions to take 2 5 mg (half tablets) BID for a few days, then decrease to daily then stop  If she has muscle spasms once discontinued, she was instructed to call our office and we can provide robaxin PRN but patient denies any muscle pain or spasm, describes only sharp stabbing rib cage pain which is intermittent and mostly with activity  - Pain regimen instructed as follows: Tylenol 650 TID - careful to not overdose as she was prescribed percocet by another provider per [place] PMED in addition to her oxycodone prescribed by trauma  Family understands instructions to only use tylenol with oxycodone and not the oxycodone- acetaminophen combination and to not use both of these formulations together  Wean valium as described  Gabapentin 100 mg at night  Pain patches applied to the rib cage     - follow-up in 2 weeks or sooner if O2 saturations < 90%, SOB, ESPINAL, IS < 1000 or increased or uncontrolled pain

## 2019-12-12 NOTE — ASSESSMENT & PLAN NOTE
- follow-up with ortho as directed  Is wearing sling as instructed  - + motor and sensation in arm/hand and fingers   Minimal swelling   - reports pain is mostly in her let rib cage, minimal shoulder pain compared to her rib cage

## 2019-12-12 NOTE — ASSESSMENT & PLAN NOTE
- appears to be due to rib fractures/pain as no history of pulmonary disease and/or oxygen use; prescribed by trauma on discharge  - she remains on 2 L and family reports O2 saturations of 89-82% at home at this setting  - follow-up in 2 weeks with trauma  If pain is better controlled at that time, consider starting to wean O2 down/off  - CXR reviewed: significant areas of atelectasis but no discrete PNA, hemothorax/effusions or pneumothorax  No symptoms of cough, sputum or fevers   Is not working with IS daily or consistently

## 2019-12-12 NOTE — PATIENT INSTRUCTIONS
Wean the diazepam from a full tablet to a half tablet and from twice a day to once a day for atleast 3-4 days before stopping  Goal is to stop this medication in the next 7-10 days If needed, we would consider methocarbomal (robaxin) for muscle spasms - call our office for a prescription if needed     Take Tylenol 650 mg three times a day for 2 weeks (until your appointment with trauma)  Continue gabapentin 100 mg daily until re-evaluated in the trauma clinic  Continue the pain patches over the rib cage  Continue to take oxycodone as needed, every 6 hours, for severe pain  Use your incentive spirometer every 20 minutes while awake   Re-eval in trauma clinic in 2 weeks, call sooner with any questions or concerns  Continue home PT/OT   Come to the ER with any symptoms of O2 saturations < 90%, shortness of breath, cough, fever, increased sleepiness/lethargy, increased or uncontrolled pain and/or if unable to get > 1000 on incentive spirometry

## 2019-12-12 NOTE — PROGRESS NOTES
Office Visit - [address] [facility] MRN: [id number]  Encounter: [id number]    Assessment and Plan  Accompanied by  and daughter    Problem List Items Addressed This Visit        Respiratory    Acute respiratory insufficiency - Primary     - appears to be due to rib fractures/pain as no history of pulmonary disease and/or oxygen use; prescribed by trauma on discharge  - she remains on 2 L and family reports O2 saturations of 89-82% at home at this setting  - follow-up in 2 weeks with trauma  If pain is better controlled at that time, consider starting to wean O2 down/off  - CXR reviewed: significant areas of atelectasis but no discrete PNA, hemothorax/effusions or pneumothorax  No symptoms of cough, sputum or fevers  Is not working with IS daily or consistently             Musculoskeletal and Integument    Anterior shoulder dislocation, right, initial encounter     - follow-up with ortho as directed  Is wearing sling as instructed  - + motor and sensation in arm/hand and fingers  Minimal swelling   - reports pain is mostly in her let rib cage, minimal shoulder pain compared to her rib cage          Multiple fractures of ribs, left side, initial encounter for closed fracture     - CXR reviewed: areas of atelectasis bilaterally appreciated  - instructed patient on importance of compliance with IS and need to do IS every 20 minutes while awake  Pt and family report she has not been using her breathing machine even daily  - caution with use of benzodiazpenes in geriatric population and especially in conjunction with oxycodone - no evidence of respiratory depression or complication to date  Recommending discontinue valium at this time  Pt reluctant as she reports she takes it for her anxiety which is a new problem since her traumatic injury  I discussed anxiety/concerns for PTSD with patient and family  They decline psychiatry evaluation and/or treatment for her anxiety at this time   They are in agreement to wean valium off  She has been taking a whole tablet (5mg) BID most recently  Will prescribe a few more tablets with instructions to take 2 5 mg (half tablets) BID for a few days, then decrease to daily then stop  If she has muscle spasms once discontinued, she was instructed to call our office and we can provide robaxin PRN but patient denies any muscle pain or spasm, describes only sharp stabbing rib cage pain which is intermittent and mostly with activity  - Pain regimen instructed as follows: Tylenol 650 TID - careful to not overdose as she was prescribed percocet by another provider per [place] [facility] in addition to her oxycodone prescribed by trauma  Family understands instructions to only use tylenol with oxycodone and not the oxycodone- acetaminophen combination and to not use both of these formulations together  Wean valium as described  Gabapentin 100 mg at night  Pain patches applied to the rib cage  - follow-up in 2 weeks or sooner if O2 saturations < 90%, SOB, ESPINAL, IS < 1000 or increased or uncontrolled pain  - continue home PT and OT therapies  Patient continues to have severely limited endurance for activity  Relevant Medications    diazepam (VALIUM) 5 mg tablet    gabapentin (NEURONTIN) 100 mg capsule          Chief Complaint:  Yusuf Singh is a 81 y o  female who presents for Fall (f/u fall)    Subjective  S/p fall down stairs with multiple displaced and non-displaced left sided rib fractures  She continues to struggle with pain control and is noncompliant with her IS  She remains on O2 at 2L since the hospitalization  She remains in a sling for her right arm related to shoulder dislocation but denies any complication from this, reports her pain is left rib cage  She remains high risk for pneumonia and/or developing worsening respiratory compromise given pain and non-compliance with pulmonary toilet exercises   Encouraged low threshold for return to ER if she appears to be worsening in any way, including O2 sats < 90%, IS < 1000, increased or uncontrolled pain, shortness of breath, dyspnea on exertion, fevers or cough  Past Medical History  History reviewed  No pertinent past medical history  Past Surgical History  History reviewed  No pertinent surgical history  Family History  History reviewed  No pertinent family history      Social History  Social History     Socioeconomic History    Marital status: /Civil Union     Spouse name: None    Number of children: None    Years of education: None    Highest education level: None   Occupational History    None   Social Needs    Financial resource strain: None    Food insecurity:     Worry: None     Inability: None    Transportation needs:     Medical: None     Non-medical: None   Tobacco Use    Smoking status: Never Smoker    Smokeless tobacco: Never Used   Substance and Sexual Activity    Alcohol use: Not Currently    Drug use: Not Currently    Sexual activity: Yes     Partners: Male   Lifestyle    Physical activity:     Days per week: None     Minutes per session: None    Stress: None   Relationships    Social connections:     Talks on phone: None     Gets together: None     Attends [employer]ism service: None     Active member of club or organization: None     Attends meetings of clubs or organizations: None     Relationship status: None    Intimate partner violence:     Fear of current or ex partner: None     Emotionally abused: None     Physically abused: None     Forced sexual activity: None   Other Topics Concern    None   Social History Narrative    None        Medications  Current Outpatient Medications on File Prior to Visit   Medication Sig Dispense Refill    acetaminophen (TYLENOL) 325 mg tablet Take 3 tablets (975 mg total) by mouth every 8 (eight) hours 30 tablet 0    diazepam (VALIUM) 5 mg tablet Take 0 5 tablets (2 5 mg total) by mouth every 8 (eight) hours for 10 days 15 tablet 0    docusate sodium (COLACE) 100 mg capsule Take 1 capsule (100 mg total) by mouth 2 (two) times a day 10 capsule 0    gabapentin (NEURONTIN) 100 mg capsule Take 1 capsule (100 mg total) by mouth 2 (two) times a day 60 capsule 0    gabapentin (NEURONTIN) 300 mg capsule Take 1 capsule (300 mg total) by mouth daily at bedtime 30 capsule 0    lidocaine (LIDODERM) 5 % Apply 1 patch topically daily Remove & Discard patch within 12 hours or as directed by MD  0    melatonin 3 mg Take 1 tablet (3 mg total) by mouth daily at bedtime  0    oxyCODONE (ROXICODONE) 5 mg immediate release tablet Take 1 tablet (5 mg total) by mouth every 4 (four) hours as needed for moderate painMax Daily Amount: 30 mg 30 tablet 0    polyethylene glycol (MIRALAX) 17 g packet Take 17 g by mouth daily 14 each 0    predniSONE 10 mg tablet Take 1 tablet (10 mg total) by mouth daily  0    senna (SENOKOT) 8 6 mg Take 1 tablet (8 6 mg total) by mouth daily at bedtime 120 each 0    sertraline (ZOLOFT) 100 mg tablet Take 1 tablet (100 mg total) by mouth daily  0     No current facility-administered medications on file prior to visit  Allergies  No Known Allergies    Review of Systems   Constitutional: Negative for chills, fatigue and fever  Eyes: Negative for visual disturbance  Respiratory: Negative for cough, chest tightness, shortness of breath, wheezing and stridor  Cardiovascular: Positive for chest pain (left sided anterior and posterior rib cage pain - point tender on exam )  Negative for palpitations  Gastrointestinal: Negative for abdominal pain, nausea and vomiting  Genitourinary: Negative for difficulty urinating and flank pain  Musculoskeletal: Positive for arthralgias (right shoulder - pain overall controlled, no weakness or paresthesias) and gait problem (related to rib cage pain, activity is limited and poor endurance )  Negative for back pain, neck pain and neck stiffness  Skin: Negative for wound  Neurological: Negative for dizziness, tremors, weakness, light-headedness, numbness and headaches  Psychiatric/Behavioral: Positive for decreased concentration and sleep disturbance  Negative for confusion, self-injury and suicidal ideas  The patient is nervous/anxious  Objective  Vitals:    12/12/19 1320   BP: 110/78   Pulse: 97   Temp: 99 4 °F (37 4 °C)       Physical Exam   Constitutional: She is oriented to person, place, and time  No distress  HENT:   Head: Normocephalic  Eyes: Conjunctivae are normal  Right eye exhibits no discharge  Left eye exhibits no discharge  Neck: Normal range of motion  No tracheal deviation present  Cardiovascular: Normal rate, regular rhythm and normal heart sounds  Pulmonary/Chest: Effort normal and breath sounds normal  No stridor  No respiratory distress  She has no wheezes  She has no rales  She exhibits tenderness (left sided anterior and posterior point tenderness )  Abdominal: Soft  There is no tenderness  Musculoskeletal: She exhibits no edema or deformity  Right arm in sling, + motor and sensory distally through fingers   Neurological: She is alert and oriented to person, place, and time  Skin: Skin is warm and dry  She is not diaphoretic  Psychiatric: She has a normal mood and affect

## 2019-12-19 ENCOUNTER — OFFICE VISIT (OUTPATIENT)
Dept: OBGYN CLINIC | Facility: OTHER | Age: 69
End: 2019-12-19
Payer: COMMERCIAL

## 2019-12-19 VITALS
DIASTOLIC BLOOD PRESSURE: 82 MMHG | HEIGHT: 62 IN | HEART RATE: 80 BPM | BODY MASS INDEX: 35.32 KG/M2 | SYSTOLIC BLOOD PRESSURE: 123 MMHG

## 2019-12-19 DIAGNOSIS — S43.014D ANTERIOR SHOULDER DISLOCATION, RIGHT, SUBSEQUENT ENCOUNTER: Primary | ICD-10-CM

## 2019-12-19 DIAGNOSIS — S43.491A BANKART LESION OF RIGHT SHOULDER, INITIAL ENCOUNTER: ICD-10-CM

## 2019-12-19 PROBLEM — S43.431A BANKART LESION OF RIGHT SHOULDER: Status: ACTIVE | Noted: 2019-12-19

## 2019-12-19 PROCEDURE — 99214 OFFICE O/P EST MOD 30 MIN: CPT | Performed by: ORTHOPAEDIC SURGERY

## 2019-12-19 NOTE — PROGRESS NOTES
Stanley Alcantar MD personally examined the patient and reviewed the history provided  I agree with the note and the assessment and plan by Retha Gaucher PA-C  Assessment  Diagnoses and all orders for this visit:    Anterior shoulder dislocation, right, subsequent encounter  -     Ambulatory Referral to [address] [place] [address] Kofi Salinas; Future    Bankart lesion of right shoulder, initial encounter        Discussion and Plan:    1  Home therapy order for therapy placed and given to patient  She is using home PT already through [facility]  2  Tylenol PRN pain  3  Discontinue sling for right shoulder  4  Range of motion allowed  She should avoid positions that cause pain and positions of dislocation  5  Follow up in 6-8 weeks - or sooner should problems arise  Subjective:   Patient ID: Fernando Basilio is a 81 y o  female      [place] presents with chief complaint of right shoulder pain  Pain started acutely after a fall  She was seen in the ER and diagnosed with a shoulder dislocation  Shoulder was reduced by the trauma team   She has a CT which shows a bony bankart  It is nearly 4 weeks from injury and she has been in a sling since injury  She has a home therapist who is doing early mobilization and respiratory rehab for her rib fractures  [place] denies recurrent dislocation since initial injury  She also denies any prior right shoulder pain prior to her fall  She denies numbness or tingling of the RUE  The following portions of the patient's history were reviewed and updated as appropriate: allergies, current medications, past family history, past medical history, past social history, past surgical history and problem list     Review of Systems   Constitutional: Negative for chills and fever  HENT: Negative for hearing loss  Eyes: Negative for visual disturbance  Respiratory: Positive for shortness of breath  Cardiovascular: Positive for chest pain     Gastrointestinal: Negative for abdominal pain  Musculoskeletal:        As reviewed in the HPI   Skin: Negative for rash  Neurological:        As reviewed in the HPI   Psychiatric/Behavioral: Negative for agitation  Objective:  /82   Pulse 80   Ht 5' 2" (1 575 m)   BMI 35 32 kg/m²       Right Shoulder Exam     Tenderness   Right shoulder tenderness location: lateral deltoid  Range of Motion   Passive abduction: 30   Right shoulder external rotation: deferred  Forward flexion: 140 (with mild discomfort)     Muscle Strength   External rotation: 3/5     Tests   Drop arm: positive    Other   Erythema: absent  Scars: present (incision over right wrist)  Sensation: normal  Pulse: present    Comments:  No ecchymosis  Sensation over lateral deloid  Good elbow range of motion  Prior surgery to right wrist so hand and wrist motion is limited            Physical Exam   Constitutional: She is oriented to person, place, and time  She appears well-developed and well-nourished  HENT:   Head: Normocephalic  Eyes: EOM are normal    Neck: Normal range of motion  Pulmonary/Chest: She has no wheezes  She exhibits tenderness  On oxygen per trauma   Neurological: She is alert and oriented to person, place, and time  Skin: Skin is warm and dry  Psychiatric: She has a normal mood and affect   Her behavior is normal  Judgment and thought content normal

## 2019-12-22 DIAGNOSIS — S22.42XA MULTIPLE FRACTURES OF RIBS, LEFT SIDE, INITIAL ENCOUNTER FOR CLOSED FRACTURE: ICD-10-CM

## 2019-12-26 ENCOUNTER — OFFICE VISIT (OUTPATIENT)
Dept: SURGERY | Facility: CLINIC | Age: 69
End: 2019-12-26
Payer: COMMERCIAL

## 2019-12-26 VITALS — TEMPERATURE: 98.2 F | BODY MASS INDEX: 35.32 KG/M2 | HEIGHT: 62 IN | HEART RATE: 84 BPM

## 2019-12-26 DIAGNOSIS — S22.39XA RIB FRACTURE: Primary | ICD-10-CM

## 2019-12-26 DIAGNOSIS — S22.42XA MULTIPLE FRACTURES OF RIBS, LEFT SIDE, INITIAL ENCOUNTER FOR CLOSED FRACTURE: ICD-10-CM

## 2019-12-26 DIAGNOSIS — R06.89 ACUTE RESPIRATORY INSUFFICIENCY: ICD-10-CM

## 2019-12-26 PROCEDURE — 99214 OFFICE O/P EST MOD 30 MIN: CPT | Performed by: PHYSICIAN ASSISTANT

## 2019-12-26 RX ORDER — METHOCARBAMOL 500 MG/1
500 TABLET, FILM COATED ORAL 4 TIMES DAILY
Qty: 60 TABLET | Refills: 0 | Status: SHIPPED | OUTPATIENT
Start: 2019-12-26 | End: 2020-01-14

## 2019-12-26 RX ORDER — OXYCODONE HYDROCHLORIDE 5 MG/1
2.5 TABLET ORAL EVERY 4 HOURS PRN
Qty: 30 TABLET | Refills: 0 | Status: SHIPPED | OUTPATIENT
Start: 2019-12-26

## 2019-12-26 RX ORDER — GABAPENTIN 100 MG/1
200 CAPSULE ORAL DAILY
Qty: 30 CAPSULE | Refills: 0 | Status: SHIPPED | OUTPATIENT
Start: 2019-12-26

## 2019-12-26 NOTE — ASSESSMENT & PLAN NOTE
- continue at 2 L as we make medication adjustments to taper her multimodal pain regimen  - patient will follow up with her primary care provider, however, she does not have an appointment until January 20th due to physician availability

## 2019-12-26 NOTE — PROGRESS NOTES
Office Visit - [address] [facility] MRN: [id number]  Encounter: [id number]    Assessment and Plan    Problem List Items Addressed This Visit        Respiratory    Acute respiratory insufficiency     - continue at 2 L as we make medication adjustments to taper her multimodal pain regimen  - patient will follow up with her primary care provider, however, she does not have an appointment until January 20th due to physician availability            Musculoskeletal and Integument    Multiple fractures of ribs, left side, initial encounter for closed fracture     - Tylenol 650mg every four hours while awake  - Ibuprofen 800mg twice daily  - Gabapentin 100mg in the morning; Gabapentin 200mg (two 100mg tablets) in the evening  - Oxycodone 2 5mg every evening as needed  - Stop Valium  - Start Robaxin 500mg every 6 hours as needed for muscle spasm  - continue oxygen at 2L   - return in 2 weeks           Other Visit Diagnoses     Rib fracture    -  Primary    Relevant Medications    gabapentin (NEURONTIN) 100 mg capsule    oxyCODONE (ROXICODONE) 5 mg immediate release tablet    methocarbamol (ROBAXIN) 500 mg tablet          Chief Complaint:  Vikash Hernandez is a 81 y o  female who presents for Fall (f/u fall)    Subjective  Taking Tylenol, Gabapentin, Oxycodone, Ibuprofen, Diazepam  Oxycodone approximately once daily  Gabapentin 100mg qam  Gabapentin 300mg qhs    Still requiring oxygen via nasal cannula at 2 L  She does have some shortness of breath on ambulation  She does not have an appointment with her primary care provider until January 20th due to physician availability    Past Medical History  History reviewed  No pertinent past medical history  Past Surgical History  History reviewed  No pertinent surgical history      Family History  Family History   Problem Relation Age of Onset    No Known Problems Mother     No Known Problems Father        Social History  Social History     Socioeconomic History    Marital status: /Civil Union     Spouse name: None    Number of children: None    Years of education: None    Highest education level: None   Occupational History    None   Social Needs    Financial resource strain: None    Food insecurity:     Worry: None     Inability: None    Transportation needs:     Medical: None     Non-medical: None   Tobacco Use    Smoking status: Never Smoker    Smokeless tobacco: Never Used   Substance and Sexual Activity    Alcohol use: Not Currently    Drug use: Not Currently    Sexual activity: Yes     Partners: Male   Lifestyle    Physical activity:     Days per week: None     Minutes per session: None    Stress: None   Relationships    Social connections:     Talks on phone: None     Gets together: None     Attends [employer]ze service: None     Active member of club or organization: None     Attends meetings of clubs or organizations: None     Relationship status: None    Intimate partner violence:     Fear of current or ex partner: None     Emotionally abused: None     Physically abused: None     Forced sexual activity: None   Other Topics Concern    None   Social History Narrative    None        Medications  Current Outpatient Medications on File Prior to Visit   Medication Sig Dispense Refill    acetaminophen (TYLENOL) 325 mg tablet Take 3 tablets (975 mg total) by mouth every 8 (eight) hours 30 tablet 0    diazepam (VALIUM) 5 mg tablet Take 0 5 tablets (2 5 mg total) by mouth every 12 (twelve) hours as needed for muscle spasms 10 tablet 0    docusate sodium (COLACE) 100 mg capsule Take 1 capsule (100 mg total) by mouth 2 (two) times a day 10 capsule 0    gabapentin (NEURONTIN) 100 mg capsule Take 1 capsule (100 mg total) by mouth daily for 14 days 20 capsule 0    gabapentin (NEURONTIN) 300 mg capsule Take 1 capsule (300 mg total) by mouth daily at bedtime 30 capsule 0    lidocaine (LIDODERM) 5 % Apply 1 patch topically daily Remove & Discard patch within 12 hours or as directed by MD  0    melatonin 3 mg Take 1 tablet (3 mg total) by mouth daily at bedtime  0    oxyCODONE (ROXICODONE) 5 mg immediate release tablet Take 1 tablet (5 mg total) by mouth every 4 (four) hours as needed for moderate painMax Daily Amount: 30 mg 30 tablet 0    polyethylene glycol (MIRALAX) 17 g packet Take 17 g by mouth daily 14 each 0    predniSONE 10 mg tablet Take 1 tablet (10 mg total) by mouth daily  0    senna (SENOKOT) 8 6 mg Take 1 tablet (8 6 mg total) by mouth daily at bedtime 120 each 0    sertraline (ZOLOFT) 100 mg tablet Take 1 tablet (100 mg total) by mouth daily  0     No current facility-administered medications on file prior to visit  Allergies  No Known Allergies    Review of Systems   Respiratory: Positive for shortness of breath  All other systems reviewed and are negative  Objective  Vitals:    12/26/19 1433   Pulse: 84   Temp: 98 2 °F (36 8 °C)       Physical Exam   Constitutional: She is oriented to person, place, and time  HENT:   Right Ear: External ear normal    Left Ear: External ear normal    Eyes: Pupils are equal, round, and reactive to light  Neck: Normal range of motion  Cardiovascular: Normal rate  Pulmonary/Chest: Effort normal and breath sounds normal  No stridor  No respiratory distress  She has no wheezes  She has no rales  Abdominal: Soft  Bowel sounds are normal  She exhibits no distension  There is no tenderness  There is no guarding  Musculoskeletal: She exhibits no edema, tenderness or deformity  Neurological: She is alert and oriented to person, place, and time  Skin: Skin is warm and dry

## 2019-12-26 NOTE — PATIENT INSTRUCTIONS
- Tylenol 650mg every four hours while awake  - Ibuprofen 800mg twice daily  - Gabapentin 100mg in the morning; Gabapentin 200mg (two 100mg tablets) in the evening  - Oxycodone 2 5mg every evening as needed  - Stop Valium  - Start Robaxin 500mg every 6 hours as needed for muscle spasm  - continue oxygen at 2L   - return in 2 weeks

## 2020-01-14 ENCOUNTER — OFFICE VISIT (OUTPATIENT)
Dept: SURGERY | Facility: CLINIC | Age: 70
End: 2020-01-14

## 2020-01-14 VITALS
DIASTOLIC BLOOD PRESSURE: 82 MMHG | TEMPERATURE: 98.1 F | BODY MASS INDEX: 31.1 KG/M2 | SYSTOLIC BLOOD PRESSURE: 134 MMHG | WEIGHT: 169 LBS | HEART RATE: 75 BPM | HEIGHT: 62 IN

## 2020-01-14 DIAGNOSIS — S22.42XA MULTIPLE FRACTURES OF RIBS, LEFT SIDE, INITIAL ENCOUNTER FOR CLOSED FRACTURE: Primary | ICD-10-CM

## 2020-01-14 DIAGNOSIS — G89.11 ACUTE PAIN DUE TO TRAUMA: ICD-10-CM

## 2020-01-14 DIAGNOSIS — S01.01XD LACERATION OF OCCIPITAL SCALP, SUBSEQUENT ENCOUNTER: ICD-10-CM
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
## 2020-01-14 DIAGNOSIS — S22.39XA RIB FRACTURE: ICD-10-CM

## 2020-01-14 PROCEDURE — PBNCHG PB NO CHARGE PLACEHOLDER: Performed by: EMERGENCY MEDICINE

## 2020-01-14 RX ORDER — METHOCARBAMOL 500 MG/1
500 TABLET, FILM COATED ORAL 4 TIMES DAILY
Qty: 60 TABLET | Refills: 0 | Status: SHIPPED | OUTPATIENT
Start: 2020-01-14

## 2020-01-14 NOTE — PROGRESS NOTES
Office Visit - General Surgery  Maryann Melara MRN: [id number]  Encounter: [id number]    Assessment and Plan    Problem List Items Addressed This Visit        Musculoskeletal and Integument    Multiple fractures of ribs, left side, initial encounter for closed fracture - Primary     Much improved with pain  Lung sounds bilaterally and clear  O2 at night at home only, checked O2 saturation and she is 98 - 99% while resting, with ambulation went to 93 - 96%.  Will discontinue Home O2            Other    Fall down stairs    Relevant Orders    Ambulatory referral to Physical Therapy    Acute pain due to trauma     Pain controlled, uses muscle relaxant after therapy         Occipital scalp laceration     healed           Other Visit Diagnoses     Rib fracture        Relevant Medications    methocarbamol (ROBAXIN) 500 mg tablet          Chief Complaint:  Maryann Melara is a 69 y.o. female who presents for Follow-up (rib injury)    Subjective  I am doing much better    Past Medical History  No past medical history on file.    Past Surgical History  No past surgical history on file.    Family History  Family History   Problem Relation Age of Onset   • No Known Problems Mother    • No Known Problems Father        Social History  Social History     Socioeconomic History   • Marital status: /Civil Union     Spouse name: None   • Number of children: None   • Years of education: None   • Highest education level: None   Occupational History   • None   Social Needs   • Financial resource strain: None   • Food insecurity:     Worry: None     Inability: None   • Transportation needs:     Medical: None     Non-medical: None   Tobacco Use   • Smoking status: Never Smoker   • Smokeless tobacco: Never Used   Substance and Sexual Activity   • Alcohol use: Not Currently   • Drug use: Not Currently   • Sexual activity: Yes     Partners: Male   Lifestyle   • Physical activity:     Days per week: None     Minutes per session: None    • Stress: None   Relationships   • Social connections:     Talks on phone: None     Gets together: None     Attends Caodaism service: None     Active member of club or organization: None     Attends meetings of clubs or organizations: None     Relationship status: None   • Intimate partner violence:     Fear of current or ex partner: None     Emotionally abused: None     Physically abused: None     Forced sexual activity: None   Other Topics Concern   • None   Social History Narrative   • None        Medications  Current Outpatient Medications on File Prior to Visit   Medication Sig Dispense Refill   • acetaminophen (TYLENOL) 325 mg tablet Take 3 tablets (975 mg total) by mouth every 8 (eight) hours 30 tablet 0   • diazepam (VALIUM) 5 mg tablet Take 0.5 tablets (2.5 mg total) by mouth every 12 (twelve) hours as needed for muscle spasms 10 tablet 0   • docusate sodium (COLACE) 100 mg capsule Take 1 capsule (100 mg total) by mouth 2 (two) times a day 10 capsule 0   • gabapentin (NEURONTIN) 100 mg capsule Take 1 capsule (100 mg total) by mouth daily for 14 days 20 capsule 0   • gabapentin (NEURONTIN) 100 mg capsule Take 2 capsules (200 mg total) by mouth daily 30 capsule 0   • gabapentin (NEURONTIN) 300 mg capsule Take 1 capsule (300 mg total) by mouth daily at bedtime 30 capsule 0   • lidocaine (LIDODERM) 5 % Apply 1 patch topically daily Remove & Discard patch within 12 hours or as directed by MD  0   • melatonin 3 mg Take 1 tablet (3 mg total) by mouth daily at bedtime  0   • oxyCODONE (ROXICODONE) 5 mg immediate release tablet Take 1 tablet (5 mg total) by mouth every 4 (four) hours as needed for moderate painMax Daily Amount: 30 mg 30 tablet 0   • oxyCODONE (ROXICODONE) 5 mg immediate release tablet Take 0.5 tablets (2.5 mg total) by mouth every 4 (four) hours as needed for moderate painMax Daily Amount: 15 mg 30 tablet 0   • polyethylene glycol (MIRALAX) 17 g packet Take 17 g by mouth daily 14 each 0   •  predniSONE 10 mg tablet Take 1 tablet (10 mg total) by mouth daily  0   • senna (SENOKOT) 8.6 mg Take 1 tablet (8.6 mg total) by mouth daily at bedtime 120 each 0   • sertraline (ZOLOFT) 100 mg tablet Take 1 tablet (100 mg total) by mouth daily  0   • [DISCONTINUED] methocarbamol (ROBAXIN) 500 mg tablet Take 1 tablet (500 mg total) by mouth 4 (four) times a day 60 tablet 0     No current facility-administered medications on file prior to visit.        Allergies  No Known Allergies    Review of Systems   Constitutional: Negative.    HENT: Negative.    Eyes: Negative.    Respiratory: Negative.    Cardiovascular: Negative.    Gastrointestinal: Negative.    Endocrine: Negative.    Genitourinary: Negative.    Musculoskeletal:        Continues with right shoulder pain, currently at therapy   Allergic/Immunologic: Negative.    Neurological: Negative.    Hematological: Negative.        Objective  Vitals:    01/14/20 1534   BP: 134/82   Pulse: 75   Temp: 98.1 °F (36.7 °C)       Physical Exam   Constitutional: She is oriented to person, place, and time. She appears well-developed and well-nourished. No distress.   HENT:   Head: Normocephalic and atraumatic.   Nose: Nose normal.   Mouth/Throat: Oropharynx is clear and moist. No oropharyngeal exudate.   Eyes: Pupils are equal, round, and reactive to light. Conjunctivae and EOM are normal. Right eye exhibits no discharge. Left eye exhibits no discharge. No scleral icterus.   Neck: Normal range of motion. Neck supple. No JVD present. No tracheal deviation present. No thyromegaly present.   Cardiovascular: Normal rate, regular rhythm, normal heart sounds and intact distal pulses. Exam reveals no gallop and no friction rub.   No murmur heard.  Pulmonary/Chest: Effort normal and breath sounds normal. No stridor. No respiratory distress. She has no wheezes. She has no rales. She exhibits no tenderness.   Abdominal: Soft. Bowel sounds are normal. She exhibits no distension and no  mass. There is no tenderness. There is no rebound and no guarding. No hernia.   Genitourinary:   Genitourinary Comments: deferred   Musculoskeletal: She exhibits tenderness. She exhibits no edema or deformity.   Decreased range of motion remains with right UE   Lymphadenopathy:     She has no cervical adenopathy.   Neurological: She is alert and oriented to person, place, and time. She displays normal reflexes. No cranial nerve deficit or sensory deficit. She exhibits normal muscle tone. Coordination normal.   Skin: Skin is warm and dry. Capillary refill takes less than 2 seconds. No rash noted. She is not diaphoretic. No erythema. No pallor.   Psychiatric: She has a normal mood and affect. Her behavior is normal. Judgment and thought content normal.

## 2020-01-14 NOTE — ASSESSMENT & PLAN NOTE
Much improved with pain  Lung sounds bilaterally and clear  O2 at night at home only, checked O2 saturation and she is 98 - 99% while resting, with ambulation went to 93 - 96%.  Will discontinue Home O2

## 2020-01-14 NOTE — PATIENT INSTRUCTIONS
70 y/o female s/p fall and resulted in rib fractures and right shoulder pain.  Doing much better.  Breathing non-labored.  Was only using Oxygen at night and after reassessing pulse ox today does not need this any longer.  F/u  With PCP

## 2020-01-30 ENCOUNTER — TELEPHONE (OUTPATIENT)
Dept: SURGERY | Facility: CLINIC | Age: 70
End: 2020-01-30

## 2020-01-30 NOTE — TELEPHONE ENCOUNTER
[facility] about patient's oxygen  Faxed office visit from 1- that says Poonam Brasher will discontinue oxygen  [facility] will arrange to have it picked up   Patient notified

## 2020-01-31 RX ORDER — GABAPENTIN 300 MG/1
CAPSULE ORAL
Qty: 30 CAPSULE | Refills: 0 | OUTPATIENT
Start: 2020-01-31

## 2020-01-31 RX ORDER — GABAPENTIN 100 MG/1
CAPSULE ORAL
Qty: 60 CAPSULE | Refills: 0 | OUTPATIENT
Start: 2020-01-31

## 2020-02-03 DIAGNOSIS — S22.39XA RIB FRACTURE: ICD-10-CM

## 2020-02-06 ENCOUNTER — OFFICE VISIT (OUTPATIENT)
Dept: OBGYN CLINIC | Facility: OTHER | Age: 70
End: 2020-02-06
Payer: COMMERCIAL

## 2020-02-06 VITALS
WEIGHT: 171 LBS | DIASTOLIC BLOOD PRESSURE: 79 MMHG | SYSTOLIC BLOOD PRESSURE: 123 MMHG | HEIGHT: 62 IN | HEART RATE: 106 BPM | BODY MASS INDEX: 31.47 KG/M2

## 2020-02-06 DIAGNOSIS — S43.491D BANKART LESION OF RIGHT SHOULDER, SUBSEQUENT ENCOUNTER: ICD-10-CM

## 2020-02-06 DIAGNOSIS — S43.014D ANTERIOR SHOULDER DISLOCATION, RIGHT, SUBSEQUENT ENCOUNTER: Primary | ICD-10-CM

## 2020-02-06 PROCEDURE — 99213 OFFICE O/P EST LOW 20 MIN: CPT | Performed by: ORTHOPAEDIC SURGERY

## 2020-02-06 RX ORDER — METHOCARBAMOL 500 MG/1
500 TABLET, FILM COATED ORAL 4 TIMES DAILY
Qty: 60 TABLET | Refills: 0 | OUTPATIENT
Start: 2020-02-06

## 2020-02-06 NOTE — PROGRESS NOTES
Cheli Arora MD personally examined the patient and reviewed the history provided  I agree with the note and the assessment and plan by Janey Escalera PA-C  Assessment  Diagnoses and all orders for this visit:    Anterior shoulder dislocation, right, subsequent encounter    Bankart lesion of right shoulder, subsequent encounter        Discussion and Plan:    Pt doing very well  At this time, no need for surgical intervention  Continue exercises for the shoulder  Activities as tolerated  Follow up PRN    Subjective:   Patient ID: Cortney Graves is a 81 y o  female      HPI    Pt presents today for follow up of R shoulder dislocation and bony Bankart lesion from a fall on 11/23/19  At her last visit, she was instructed to continue therapy to work on ROM  Pt states she is doing well  States she has no pain and therapy has done great work increasing her ROM  She has no complaints at this time  The following portions of the patient's history were reviewed and updated as appropriate: allergies, current medications, past family history, past medical history, past social history, past surgical history and problem list     Review of Systems   Constitutional: Negative  HENT: Negative  Eyes: Negative  Respiratory: Negative  Cardiovascular: Negative  Gastrointestinal: Negative  Endocrine: Negative  Genitourinary: Negative  Allergic/Immunologic: Negative  Neurological: Negative  Hematological: Negative  Psychiatric/Behavioral: Negative  Objective:  /79   Pulse (!) 106   Ht 5' 2" (1 575 m)   Wt 77 6 kg (171 lb)   LMP  (LMP Unknown)   BMI 31 28 kg/m²       Right Shoulder Exam     Tenderness   The patient is experiencing no tenderness  Range of Motion   Active abduction: 90   External rotation: normal   Forward flexion: normal   Right shoulder internal rotation 0 degrees: lower thoracic       Muscle Strength   Abduction: 5/5   External rotation: 5/5     Other Erythema: absent  Sensation: normal  Pulse: present            Physical Exam   Constitutional: She is oriented to person, place, and time  She appears well-developed and well-nourished  HENT:   Head: Normocephalic and atraumatic  Eyes: Conjunctivae and EOM are normal    Neck: No tracheal deviation present  No thyromegaly present  Cardiovascular: Normal rate, regular rhythm and intact distal pulses  Pulmonary/Chest: Effort normal    Abdominal: She exhibits no distension  There is no guarding  Neurological: She is alert and oriented to person, place, and time  Skin: Skin is warm and dry  Psychiatric: She has a normal mood and affect  Her behavior is normal  Judgment and thought content normal          I have personally reviewed pertinent films in PACS and my interpretation is as follows    No new films

## 2020-09-16 NOTE — QUICK NOTE
Patient scheduled methocarbamol transitioned to valium 2 5 mg PO Q8h scheduled due to significant anxiety which causes exacerbation of pain  Will monitor for effect and titrate dose if needed 
DISPLAY PLAN FREE TEXT

## 2022-03-16 ENCOUNTER — OFFICE VISIT (OUTPATIENT)
Dept: OBGYN CLINIC | Facility: CLINIC | Age: 72
End: 2022-03-16
Payer: COMMERCIAL

## 2022-03-16 VITALS — DIASTOLIC BLOOD PRESSURE: 90 MMHG | WEIGHT: 169 LBS | BODY MASS INDEX: 30.91 KG/M2 | SYSTOLIC BLOOD PRESSURE: 145 MMHG

## 2022-03-16 DIAGNOSIS — Z78.0 ENCOUNTER FOR OSTEOPOROSIS SCREENING IN ASYMPTOMATIC POSTMENOPAUSAL PATIENT: ICD-10-CM

## 2022-03-16 DIAGNOSIS — Z12.31 ENCOUNTER FOR SCREENING MAMMOGRAM FOR MALIGNANT NEOPLASM OF BREAST: ICD-10-CM

## 2022-03-16 DIAGNOSIS — Z13.820 ENCOUNTER FOR OSTEOPOROSIS SCREENING IN ASYMPTOMATIC POSTMENOPAUSAL PATIENT: ICD-10-CM

## 2022-03-16 DIAGNOSIS — Z01.419 ENCOUNTER FOR GYNECOLOGICAL EXAMINATION WITH PAPANICOLAOU SMEAR OF CERVIX: Primary | ICD-10-CM

## 2022-03-16 PROCEDURE — G0476 HPV COMBO ASSAY CA SCREEN: HCPCS | Performed by: OBSTETRICS & GYNECOLOGY

## 2022-03-16 PROCEDURE — S0610 ANNUAL GYNECOLOGICAL EXAMINA: HCPCS | Performed by: OBSTETRICS & GYNECOLOGY

## 2022-03-16 PROCEDURE — G0145 SCR C/V CYTO,THINLAYER,RESCR: HCPCS | Performed by: OBSTETRICS & GYNECOLOGY

## 2022-03-16 NOTE — PROGRESS NOTES
ASSESSMENT & PLAN: Pilar Mejia is a 73 y o  No obstetric history on file  with normal gynecologic exam     1   Routine well woman exam done today  2  Pap and HPV:  The patient's last pap and hpv was over 10 years ago   It was normal     Pap with cotesting was done today  Current ASCCP Guidelines reviewed  3   Mammogram ordered  4  Colorectal cancer screening was notordered  2016 cleared for 10 years   5  The following were reviewed in today's visit: breast self exam, mammography screening ordered, menopause, osteoporosis, adequate intake of calcium and vitamin D, exercise and healthy diet  CC:  Annual Gynecologic Examination    HPI: Pilar Mejia is a 73 y o  No obstetric history on file  who presents for annual gynecologic examination  She has the following concerns:  None     Health Maintenance:    She wears her seatbelt routinely  She does  Not perform regular monthly self breast exams  She feels safe at home  History reviewed  No pertinent past medical history  History reviewed  No pertinent surgical history  Past OB/Gyn History:  OB History    No obstetric history on file         History of abnormal pap smears: No        Family History   Problem Relation Age of Onset    No Known Problems Mother     No Known Problems Father        Social History:  Social History     Socioeconomic History    Marital status: /Civil Union     Spouse name: Not on file    Number of children: Not on file    Years of education: Not on file    Highest education level: Not on file   Occupational History    Not on file   Tobacco Use    Smoking status: Never Smoker    Smokeless tobacco: Never Used   Substance and Sexual Activity    Alcohol use: Not Currently    Drug use: Not Currently    Sexual activity: Yes     Partners: Male   Other Topics Concern    Not on file   Social History Narrative    Not on file     Social Determinants of Health     Financial Resource Strain: Not on file Food Insecurity: Not on file   Transportation Needs: Not on file   Physical Activity: Not on file   Stress: Not on file   Social Connections: Not on file   Intimate Partner Violence: Not on file   Housing Stability: Not on file         No Known Allergies      Current Outpatient Medications:     acetaminophen (TYLENOL) 325 mg tablet, Take 3 tablets (975 mg total) by mouth every 8 (eight) hours, Disp: 30 tablet, Rfl: 0    diazepam (VALIUM) 5 mg tablet, Take 0 5 tablets (2 5 mg total) by mouth every 12 (twelve) hours as needed for muscle spasms, Disp: 10 tablet, Rfl: 0    docusate sodium (COLACE) 100 mg capsule, Take 1 capsule (100 mg total) by mouth 2 (two) times a day, Disp: 10 capsule, Rfl: 0    gabapentin (NEURONTIN) 100 mg capsule, Take 2 capsules (200 mg total) by mouth daily, Disp: 30 capsule, Rfl: 0    gabapentin (NEURONTIN) 300 mg capsule, Take 1 capsule (300 mg total) by mouth daily at bedtime, Disp: 30 capsule, Rfl: 0    lidocaine (LIDODERM) 5 %, Apply 1 patch topically daily Remove & Discard patch within 12 hours or as directed by MD, Disp: , Rfl: 0    melatonin 3 mg, Take 1 tablet (3 mg total) by mouth daily at bedtime, Disp: , Rfl: 0    methocarbamol (ROBAXIN) 500 mg tablet, Take 1 tablet (500 mg total) by mouth 4 (four) times a day, Disp: 60 tablet, Rfl: 0    oxyCODONE (ROXICODONE) 5 mg immediate release tablet, Take 1 tablet (5 mg total) by mouth every 4 (four) hours as needed for moderate painMax Daily Amount: 30 mg, Disp: 30 tablet, Rfl: 0    oxyCODONE (ROXICODONE) 5 mg immediate release tablet, Take 0 5 tablets (2 5 mg total) by mouth every 4 (four) hours as needed for moderate painMax Daily Amount: 15 mg, Disp: 30 tablet, Rfl: 0    polyethylene glycol (MIRALAX) 17 g packet, Take 17 g by mouth daily, Disp: 14 each, Rfl: 0    predniSONE 10 mg tablet, Take 1 tablet (10 mg total) by mouth daily, Disp: , Rfl: 0    senna (SENOKOT) 8 6 mg, Take 1 tablet (8 6 mg total) by mouth daily at bedtime, Disp: 120 each, Rfl: 0    sertraline (ZOLOFT) 100 mg tablet, Take 1 tablet (100 mg total) by mouth daily, Disp: , Rfl: 0    Review of Systems  Constitutional :no fever, feels well, no tiredness, no recent weight gain or loss  ENT: no ear ache, no loss of hearing, no nosebleeds or nasal discharge, no sore throat or hoarseness  Cardiovascular: no complaints of slow or fast heart beat, no chest pain, no palpitations, no leg claudication or lower extremity edema  Respiratory: no complaints of shortness of shortness of breath, no ESPINAL  Breasts:no complaints of breast pain, breast lump, or nipple discharge  Gastrointestinal: no complaints of abdominal pain, constipation, nausea, vomiting, or diarrhea or bloody stools  Genitourinary : no complaints of dysuria, incontinence, pelvic pain, no dysmenorrhea, vaginal discharge or abnormal vaginal bleeding and as noted in HPI  Musculoskeletal: has pain from arthritis    Integumentary: no complaints of skin rash or lesion, itching or dry skin  Neurological: no complaints of headache, no confusion, no numbness or tingling, no dizziness or fainting    Objective      /90   Wt 76 7 kg (169 lb)   LMP  (LMP Unknown)   BMI 30 91 kg/m²   General:   appears stated age, cooperative, alert normal mood and affect   Lungs: Unlabored breathing    Breasts: normal appearance, no masses or tenderness   Abdomen: soft, non-tender, without masses or organomegaly   Vulva: normal   Vagina: vagina positive for atrophic mucosa   Urethra: normal   Cervix: Normal, no discharge  Nontender     Uterus: normal size, contour, position, consistency, mobility, non-tender   Adnexa: no mass, fullness, tenderness   Psychiatric orientation to person, place, and time: normal  mood and affect: normal

## 2022-03-17 LAB
HPV HR 12 DNA CVX QL NAA+PROBE: NEGATIVE
HPV16 DNA CVX QL NAA+PROBE: NEGATIVE
HPV18 DNA CVX QL NAA+PROBE: NEGATIVE

## 2022-03-22 LAB
LAB AP GYN PRIMARY INTERPRETATION: NORMAL
Lab: NORMAL

## 2023-07-20 ENCOUNTER — OFFICE VISIT (OUTPATIENT)
Dept: INTERNAL MEDICINE CLINIC | Facility: CLINIC | Age: 73
End: 2023-07-20
Payer: COMMERCIAL

## 2023-07-20 VITALS
BODY MASS INDEX: 30.66 KG/M2 | HEART RATE: 74 BPM | SYSTOLIC BLOOD PRESSURE: 132 MMHG | HEIGHT: 62 IN | DIASTOLIC BLOOD PRESSURE: 78 MMHG | OXYGEN SATURATION: 94 % | WEIGHT: 166.6 LBS

## 2023-07-20 DIAGNOSIS — I71.21 ANEURYSM OF ASCENDING AORTA WITHOUT RUPTURE (HCC): ICD-10-CM

## 2023-07-20 DIAGNOSIS — H93.13 BILATERAL TINNITUS: ICD-10-CM

## 2023-07-20 DIAGNOSIS — Z86.718 HISTORY OF DVT (DEEP VEIN THROMBOSIS): ICD-10-CM

## 2023-07-20 DIAGNOSIS — I26.99 BILATERAL PULMONARY EMBOLISM (HCC): ICD-10-CM

## 2023-07-20 DIAGNOSIS — M06.9 RHEUMATOID ARTHRITIS, INVOLVING UNSPECIFIED SITE, UNSPECIFIED WHETHER RHEUMATOID FACTOR PRESENT (HCC): Primary | ICD-10-CM

## 2023-07-20 DIAGNOSIS — G89.29 CHRONIC INTRACTABLE HEADACHE, UNSPECIFIED HEADACHE TYPE: ICD-10-CM

## 2023-07-20 DIAGNOSIS — E53.8 B12 DEFICIENCY: ICD-10-CM

## 2023-07-20 DIAGNOSIS — E27.49 GLUCOCORTICOID DEFICIENCY (HCC): ICD-10-CM

## 2023-07-20 DIAGNOSIS — I10 ESSENTIAL HYPERTENSION: ICD-10-CM

## 2023-07-20 DIAGNOSIS — R51.9 CHRONIC INTRACTABLE HEADACHE, UNSPECIFIED HEADACHE TYPE: ICD-10-CM

## 2023-07-20 DIAGNOSIS — E61.1 IRON DEFICIENCY: ICD-10-CM

## 2023-07-20 PROBLEM — G89.11 ACUTE PAIN DUE TO TRAUMA: Status: RESOLVED | Noted: 2019-11-25 | Resolved: 2023-07-20

## 2023-07-20 PROBLEM — M51.369 DDD (DEGENERATIVE DISC DISEASE), LUMBAR: Status: ACTIVE | Noted: 2022-07-12

## 2023-07-20 PROBLEM — K57.20 DIVERTICULITIS OF LARGE INTESTINE WITH PERFORATION AND ABSCESS: Status: RESOLVED | Noted: 2022-09-14 | Resolved: 2023-07-20

## 2023-07-20 PROBLEM — M51.36 DDD (DEGENERATIVE DISC DISEASE), LUMBAR: Status: ACTIVE | Noted: 2022-07-12

## 2023-07-20 PROBLEM — R73.03 PREDIABETES: Status: ACTIVE | Noted: 2023-07-20

## 2023-07-20 PROBLEM — R06.89 ACUTE RESPIRATORY INSUFFICIENCY: Status: RESOLVED | Noted: 2019-12-02 | Resolved: 2023-07-20

## 2023-07-20 PROBLEM — M54.81 OCCIPITAL NEURALGIA OF RIGHT SIDE: Status: ACTIVE | Noted: 2023-07-20

## 2023-07-20 PROBLEM — K82.4 GALL BLADDER POLYP: Status: ACTIVE | Noted: 2018-06-18
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
## 2023-07-20 PROBLEM — S01.01XA OCCIPITAL SCALP LACERATION: Status: RESOLVED | Noted: 2019-12-02 | Resolved: 2023-07-20

## 2023-07-20 PROBLEM — K57.20 DIVERTICULITIS OF LARGE INTESTINE WITH PERFORATION AND ABSCESS: Status: ACTIVE | Noted: 2022-09-14

## 2023-07-20 PROCEDURE — 96372 THER/PROPH/DIAG INJ SC/IM: CPT

## 2023-07-20 PROCEDURE — 99204 OFFICE O/P NEW MOD 45 MIN: CPT | Performed by: INTERNAL MEDICINE

## 2023-07-20 RX ORDER — ERGOCALCIFEROL 1.25 MG/1
CAPSULE ORAL
COMMUNITY
Start: 2023-07-12

## 2023-07-20 RX ORDER — ABATACEPT 125 MG/ML
INJECTION, SOLUTION SUBCUTANEOUS
COMMUNITY
Start: 2023-07-11

## 2023-07-20 RX ORDER — METHOTREXATE 2.5 MG/1
TABLET ORAL
COMMUNITY
Start: 2023-05-04 | End: 2023-07-20

## 2023-07-20 RX ORDER — APIXABAN 5 MG/1
5 TABLET, FILM COATED ORAL 2 TIMES DAILY
COMMUNITY
Start: 2023-06-18 | End: 2023-07-21

## 2023-07-20 RX ORDER — KETOROLAC TROMETHAMINE 30 MG/ML
15 INJECTION, SOLUTION INTRAMUSCULAR; INTRAVENOUS ONCE
Status: COMPLETED | OUTPATIENT
Start: 2023-07-20 | End: 2023-07-20

## 2023-07-20 RX ORDER — FOLIC ACID 1 MG/1
1000 TABLET ORAL DAILY
COMMUNITY
Start: 2023-05-04 | End: 2023-07-20

## 2023-07-20 RX ADMIN — KETOROLAC TROMETHAMINE 15 MG: 30 INJECTION, SOLUTION INTRAMUSCULAR; INTRAVENOUS at 16:04

## 2023-07-20 NOTE — PROGRESS NOTES
Name: Dulce Chin      : 1950      MRN: [id number]  Encounter Provider: Som Arora MD  Encounter Date: 2023   Encounter department: [facility]     1. Rheumatoid arthritis, involving unspecified site, unspecified whether rheumatoid factor present [facility])  Assessment & Plan:  -Followed by rheumatology at [facility]  -Currently on Orencia and prednisone 10 mg daily  -Uses oxycodone as needed for severe pain flares      2. Bilateral pulmonary embolism (HCC)  Assessment & Plan:  -Patient has completed 6 months of anticoagulation. She was taken off of Eliquis by Dr. Dara Xie at [facility]. Discussed with patient that strong consideration should be given to indefinite anticoagulation given her history of unprovoked DVT in  now followed by provoked PE in . The patient and her  do not wish to see her go back on anticoagulation. Discussed the risk of doing so and explained this increases her likelihood of a recurrent thrombotic event. They expressed understanding. 3. History of DVT (deep vein thrombosis)    4. Essential hypertension  Assessment & Plan:  -Blood pressure well controlled      5. Chronic intractable headache, unspecified headache type  Assessment & Plan:  -Headache 2 months in duration. Has a history of headache. Previously seen by Dr. Gregor Jones of neurology at [facility] for an SPG injection. A referral has been made to our headache clinic for further evaluation and management. Patient will be given a Toradol injection in office today 50 mg IM. Orders:  -     Ambulatory Referral to Neurology; Future  -     ketorolac (TORADOL) injection 15 mg    6. Aneurysm of ascending aorta without rupture [facility])  Assessment & Plan:  -Measuring 4.4cm  -Due for f/u CT scan in       7. Glucocorticoid deficiency ([address])  Assessment & Plan:  -Secondary to chronic steroid use in the setting of rheumatoid arthritis      8.  Iron deficiency  Assessment & Plan:  - Followed by hematology at [facility]  - Recently underwent an iron infusion      9. B12 deficiency  Assessment & Plan:  - Receives monthly B12 injections      10. Bilateral tinnitus  -     Ambulatory Referral to Otolaryngology; Future           Subjective     HPI   Patient presents today to establish care. She is a former patient of mine from a previous practice. Her past medical history is most notable for rheumatoid arthritis, diverticulitis with perforation status post colostomy placement, and 2 separate thrombotic events including a provoked bilateral PE in 2022 and unprovoked DVT in 2011. Regarding her rheumatoid arthritis she is currently on chronic steroids with prednisone 10 mg daily. She is currently followed by rheumatology at [facility]. She takes oxycodone as needed for pain management. Towards the end of 2022 she was hospitalized for diverticulitis with perforation. She had a colostomy for short period of time before it was successfully reversed at the beginning of 2023. She has a history of DVT and PE. Most recent PE occurred in the setting of her acute diverticulitis with perforation. She completed a total of 6 months of anticoagulation. Today she complains of chronic headache that has been present for the past 2 months. She was previously seen by neurology at [facility]. In 2017 she received an SPG injection from Dr. Zettie Hodgkins. She also reports tinnitus over the past few months. She does endorse some difficulty with hearing. She knows to ringing in her years makes it difficult for her to fall asleep at night.     ROS as per HPI    Past Medical History:   Diagnosis Date   • Acute respiratory insufficiency 12/2/2019   • Depression    • Diverticulitis of large intestine with perforation and abscess 09/14/2022   • Fall down stairs 11/25/2019   • History of DVT (deep vein thrombosis)    • Occipital scalp laceration 12/2/2019   • Pneumonitis    • Rheumatoid arthritis [facility])      Past Surgical History:   Procedure Laterality Date   •  SECTION     • EXPLORATORY LAPAROTOMY     • HAND SURGERY Right    • HARTMANS PROCEDURE  2022     Family History   Problem Relation Age of Onset   • Heart disease Mother    • Hypertension Mother    • Hypertension Father      Social History     Socioeconomic History   • Marital status: /Civil Union     Spouse name: None   • Number of children: None   • Years of education: None   • Highest education level: None   Occupational History   • None   Tobacco Use   • Smoking status: Never   • Smokeless tobacco: Never   Substance and Sexual Activity   • Alcohol use: Not Currently   • Drug use: Not Currently   • Sexual activity: Yes     Partners: Male   Other Topics Concern   • None   Social History Narrative   • None     Social Determinants of Health     Financial Resource Strain: Not on file   Food Insecurity: Not on file   Transportation Needs: Not on file   Physical Activity: Not on file   Stress: Not on file   Social Connections: Not on file   Intimate Partner Violence: Not on file   Housing Stability: Not on file     Current Outpatient Medications on File Prior to Visit   Medication Sig   • acetaminophen (TYLENOL) 325 mg tablet Take 3 tablets (975 mg total) by mouth every 8 (eight) hours   • ergocalciferol (VITAMIN D2) 50,000 units TAKE 1 CAPSULE BY MOUTH ONE TIME PER WEEK   • methocarbamol (ROBAXIN) 500 mg tablet Take 1 tablet (500 mg total) by mouth 4 (four) times a day   • Orencia ClickJect 050 MG/ML SOAJ    • oxyCODONE (ROXICODONE) 5 mg immediate release tablet Take 1 tablet (5 mg total) by mouth every 4 (four) hours as needed for moderate painMax Daily Amount: 30 mg   • oxyCODONE (ROXICODONE) 5 mg immediate release tablet Take 0.5 tablets (2.5 mg total) by mouth every 4 (four) hours as needed for moderate painMax Daily Amount: 15 mg   • predniSONE 10 mg tablet Take 1 tablet (10 mg total) by mouth daily   • sertraline (ZOLOFT) 100 mg tablet Take 1 tablet (100 mg total) by mouth daily   • [DISCONTINUED] Eliquis 5 MG Take 5 mg by mouth 2 (two) times a day (Patient not taking: Reported on 7/20/2023)     No Known Allergies  Immunization History   Administered Date(s) Administered   • COVID-19 PFIZER VACCINE 0.3 ML IM 03/19/2021, 04/09/2021, 12/15/2021   • COVID-19 Pfizer vac (Naif-sucrose, gray cap) 12 yr+ IM 05/19/2022   • INFLUENZA 09/28/2012, 10/28/2013, 10/24/2014, 11/24/2017, 09/26/2018, 11/24/2019, 01/20/2020, 09/14/2020, 11/18/2021, 12/12/2022   • Influenza, Seasonal Vaccine, Quadrivalent, Adjuvanted, .5e 09/14/2020, 11/18/2021, 12/12/2022   • Influenza, high dose seasonal 0.7 mL 11/24/2019   • Pneumococcal Conjugate 13-Valent 11/24/2017   • Pneumococcal Polysaccharide PPV23 10/28/2013, 09/16/2020   • Tdap 08/22/2010       Objective     /78   Pulse 74   Ht 5' 1.89" (1.572 m)   Wt 75.6 kg (166 lb 9.6 oz)   LMP  (LMP Unknown)   SpO2 94%   BMI 30.58 kg/m²     BP Readings from Last 3 Encounters:   07/20/23 132/78   03/16/22 145/90   02/06/20 123/79        Wt Readings from Last 3 Encounters:   07/20/23 75.6 kg (166 lb 9.6 oz)   03/16/22 76.7 kg (169 lb)   02/06/20 77.6 kg (171 lb)        Physical Exam    General: NAD, Alert and oriented x3   HEENT: NCAT, EOMI, normal conjunctiva  Cardiovascular: RRR, normal S1 and S2, no m/r/g  Pulmonary: Normal respiratory effort, no wheezes, rales or rhonchi  GI: Soft, nontender, nondistended, normoactive bowel sounds  MSK: Ulnar deviation of MCPs right greater than left  Neuro: Non-focal, ambulating without difficulty, non-antalgic gait  Extremities: No lower extremity edema  Skin: Normal skin color, no rashes     Franklin Obrien MD

## 2023-07-21 PROBLEM — H93.13 TINNITUS OF BOTH EARS: Status: ACTIVE | Noted: 2023-07-21

## 2023-07-21 PROBLEM — M06.9 RHEUMATOID ARTHRITIS (HCC): Status: ACTIVE | Noted: 2023-07-21

## 2023-07-21 PROBLEM — G89.29 CHRONIC INTRACTABLE HEADACHE: Status: ACTIVE | Noted: 2023-07-21

## 2023-07-21 PROBLEM — R51.9 CHRONIC INTRACTABLE HEADACHE: Status: ACTIVE | Noted: 2023-07-21

## 2023-07-21 NOTE — ASSESSMENT & PLAN NOTE
- Followed by hematology at [facility]  - Recently underwent an iron infusion

## 2023-07-21 NOTE — ASSESSMENT & PLAN NOTE
-Headache 2 months in duration. Has a history of headache. Previously seen by Dr. Shannen Correia of neurology at [facility] for an SPG injection. A referral has been made to our headache clinic for further evaluation and management. Patient will be given a Toradol injection in office today 50 mg IM.

## 2023-07-21 NOTE — ASSESSMENT & PLAN NOTE
-Followed by rheumatology at [facility]  -Currently on Orencia and prednisone 10 mg daily  -Uses oxycodone as needed for severe pain flares

## 2023-07-21 NOTE — ASSESSMENT & PLAN NOTE
-Patient has completed 6 months of anticoagulation. She was taken off of Eliquis by Dr. Gee Gutierrez at [facility]. 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 The patient and her  do not wish to see her go back on anticoagulation. Discussed the risk of doing so and explained this increases her likelihood of a recurrent thrombotic event. They expressed understanding.

## 2023-08-09 ENCOUNTER — RA CDI HCC (OUTPATIENT)
Dept: OTHER | Facility: HOSPITAL | Age: 73
End: 2023-08-09

## 2023-08-09 NOTE — PROGRESS NOTES
F32.A Depression- found in active problem list - Can Depression be further classified using more specific code  [address] coding opportunities          Chart Reviewed number of suggestions sent to Provider: 1     Patients Insurance        Commercial Insurance: sIak Sagastume

## 2023-08-16 RX ORDER — METHOTREXATE 2.5 MG/1
TABLET ORAL
COMMUNITY
Start: 2023-07-31

## 2023-08-21 ENCOUNTER — OFFICE VISIT (OUTPATIENT)
Dept: INTERNAL MEDICINE CLINIC | Facility: CLINIC | Age: 73
End: 2023-08-21
Payer: COMMERCIAL

## 2023-08-21 VITALS
SYSTOLIC BLOOD PRESSURE: 108 MMHG | OXYGEN SATURATION: 94 % | WEIGHT: 167.8 LBS | DIASTOLIC BLOOD PRESSURE: 72 MMHG | HEIGHT: 62 IN | HEART RATE: 82 BPM | BODY MASS INDEX: 30.88 KG/M2

## 2023-08-21 DIAGNOSIS — M54.16 LUMBAR RADICULOPATHY: ICD-10-CM

## 2023-08-21 DIAGNOSIS — Z00.00 ANNUAL PHYSICAL EXAM: Primary | ICD-10-CM

## 2023-08-21 DIAGNOSIS — F32.0 MAJOR DEPRESSIVE DISORDER, SINGLE EPISODE, MILD (HCC): ICD-10-CM

## 2023-08-21 DIAGNOSIS — G47.33 OSA (OBSTRUCTIVE SLEEP APNEA): ICD-10-CM

## 2023-08-21 DIAGNOSIS — H93.13 BILATERAL TINNITUS: ICD-10-CM

## 2023-08-21 DIAGNOSIS — M71.21 BAKER'S CYST OF KNEE, RIGHT: ICD-10-CM

## 2023-08-21 DIAGNOSIS — M06.9 RHEUMATOID ARTHRITIS, INVOLVING UNSPECIFIED SITE, UNSPECIFIED WHETHER RHEUMATOID FACTOR PRESENT (HCC): ICD-10-CM

## 2023-08-21 DIAGNOSIS — K59.00 CONSTIPATION, UNSPECIFIED CONSTIPATION TYPE: ICD-10-CM

## 2023-08-21 DIAGNOSIS — Z12.31 ENCOUNTER FOR SCREENING MAMMOGRAM FOR BREAST CANCER: ICD-10-CM

## 2023-08-21 PROCEDURE — 99396 PREV VISIT EST AGE 40-64: CPT | Performed by: INTERNAL MEDICINE

## 2023-08-21 PROCEDURE — 99214 OFFICE O/P EST MOD 30 MIN: CPT | Performed by: INTERNAL MEDICINE

## 2023-08-21 RX ORDER — DOCUSATE SODIUM 100 MG/1
100 CAPSULE, LIQUID FILLED ORAL 2 TIMES DAILY PRN
Qty: 60 CAPSULE | Refills: 0 | Status: SHIPPED | OUTPATIENT
Start: 2023-08-21

## 2023-08-21 RX ORDER — GABAPENTIN 300 MG/1
300 CAPSULE ORAL
Qty: 30 CAPSULE | Refills: 0 | Status: SHIPPED | OUTPATIENT
Start: 2023-08-21

## 2023-08-21 NOTE — ASSESSMENT & PLAN NOTE
-Patient will be given a trial of gabapentin 300 mg nightly. -They have a referral to see the spine center at [facility]. I have encouraged him to schedule an appointment to discuss possible injection.

## 2023-08-21 NOTE — PROGRESS NOTES
ADULT ANNUAL PHYSICAL  [address] [facility]    NAME: Chanelle Roca  AGE: 81 y.o. SEX: female  : 1950     DATE: 2023     Assessment and Plan:     Problem List Items Addressed This Visit        Respiratory    CHRISTIANO (obstructive sleep apnea)     -Patient diagnosed with severe CHRISTIANO  -Seen by pulmonology at [facility] on   -CPAP has been recommended and is currently undergoing insurance approval            Nervous and Auditory    Lumbar radiculopathy     -Patient will be given a trial of gabapentin 300 mg nightly. -They have a referral to see the spine center at [facility]. I have encouraged him to schedule an appointment to discuss possible injection. Relevant Medications    gabapentin (Neurontin) 300 mg capsule       Musculoskeletal and Integument    Rheumatoid arthritis ([address])     - Appreciate rheumatology follow-up           Baker's cyst of knee, right     -A referral has been made to orthopedic surgery         Relevant Orders    Ambulatory Referral to Orthopedic Surgery       Other    Bilateral tinnitus     -Patient seen by ENT  -Found to have mild high-frequency hearing loss  -She was also noted to have a cerumen impaction which was cleared         Major depressive disorder, single episode, mild (HCC)     -Stable continue current dose of Zoloft         Constipation     -Secondary to chronic opioid use  -Patient has been given a prescription for docusate 100 mg twice daily as needed         Relevant Medications    docusate sodium (COLACE) 100 mg capsule   Other Visit Diagnoses     Annual physical exam    -  Primary    Encounter for screening mammogram for breast cancer        Relevant Orders    Mammo screening bilateral w 3d & cad        • -Chronic steroid use and Osteopenia and DEXA scan      Immunizations and preventive care screenings were discussed with patient today.  Appropriate education was printed on patient's after visit summary. Counseling:  · {Annual Physical; Counselin}         Return in about 6 months (around 2024). Chief Complaint:     Chief Complaint   Patient presents with   • Establish Care      History of Present Illness:     Adult Annual Physical   Patient here for a comprehensive physical exam. The patient reports {problems:44886}. Diet and Physical Activity  · Diet/Nutrition: {annual physical; diet:18510154}. · Exercise: {annual physical; exercise:29922635}. Depression Screening  PHQ-2/9 Depression Screening         General Health  · Sleep: {annual physical; sleep:2102}. · Hearing: {annual physical; hearin}. · Vision: {annual physical; vision:}. · Dental: {annual physical; dental:}. /GYN Health  · Patient is: {Menopause:46058}  · Last menstrual period: ***  · Contraceptive method: {contraceptive options:}.      Review of Systems:     Review of Systems   Past Medical History:     Past Medical History:   Diagnosis Date   • Acute respiratory insufficiency 2019   • Arthritis    • Depression    • Diverticulitis of large intestine with perforation and abscess 2022   • Fall down stairs 2019   • History of DVT (deep vein thrombosis)    • Occipital scalp laceration 2019   • Pneumonitis    • Rheumatoid arthritis ([address])       Past Surgical History:     Past Surgical History:   Procedure Laterality Date   •  SECTION     • EXPLORATORY LAPAROTOMY     • HAND SURGERY Right    • HARTMANS PROCEDURE  2022      Social History:     Social History     Socioeconomic History   • Marital status: /Civil Union     Spouse name: None   • Number of children: None   • Years of education: None   • Highest education level: None   Occupational History   • None   Tobacco Use   • Smoking status: Never   • Smokeless tobacco: Never   Substance and Sexual Activity   • Alcohol use: Not Currently   • Drug use: Not Currently   • Sexual activity: Yes     Partners: Male   Other Topics Concern   • None   Social History Narrative   • None     Social Determinants of Health     Financial Resource Strain: Not on file   Food Insecurity: Not on file   Transportation Needs: Not on file   Physical Activity: Not on file   Stress: Not on file   Social Connections: Not on file   Intimate Partner Violence: Not on file   Housing Stability: Not on file      Family History:     Family History   Problem Relation Age of Onset   • Heart disease Mother    • Hypertension Mother    • Hypertension Father    • Arthritis Family       Current Medications:     Current Outpatient Medications   Medication Sig Dispense Refill   • acetaminophen (TYLENOL) 325 mg tablet Take 3 tablets (975 mg total) by mouth every 8 (eight) hours 30 tablet 0   • docusate sodium (COLACE) 100 mg capsule Take 1 capsule (100 mg total) by mouth 2 (two) times a day as needed for constipation 60 capsule 0   • ergocalciferol (VITAMIN D2) 50,000 units TAKE 1 CAPSULE BY MOUTH ONE TIME PER WEEK     • gabapentin (Neurontin) 300 mg capsule Take 1 capsule (300 mg total) by mouth daily at bedtime 30 capsule 0   • methocarbamol (ROBAXIN) 500 mg tablet Take 1 tablet (500 mg total) by mouth 4 (four) times a day 60 tablet 0   • methotrexate 2.5 MG tablet      • Orencia ClickJect 130 MG/ML SOAJ      • oxyCODONE (ROXICODONE) 5 mg immediate release tablet Take 1 tablet (5 mg total) by mouth every 4 (four) hours as needed for moderate painMax Daily Amount: 30 mg 30 tablet 0   • oxyCODONE (ROXICODONE) 5 mg immediate release tablet Take 0.5 tablets (2.5 mg total) by mouth every 4 (four) hours as needed for moderate painMax Daily Amount: 15 mg 30 tablet 0   • predniSONE 10 mg tablet Take 1 tablet (10 mg total) by mouth daily  0   • sertraline (ZOLOFT) 100 mg tablet Take 1 tablet (100 mg total) by mouth daily  0     No current facility-administered medications for this visit.       Allergies:     No Known Allergies   Physical Exam:     /72   Pulse 82   Ht 5' 2" (1.575 m)   Wt 76.1 kg (167 lb 12.8 oz)   LMP  (LMP Unknown)   SpO2 94%   BMI 30.69 kg/m²     Physical Exam     Franklin Wheeler MD  [facility]

## 2023-08-21 NOTE — PROGRESS NOTES
ADULT ANNUAL PHYSICAL  [address] [facility]    NAME: Suzanna Arnett  AGE: 81 y.o. SEX: female  : 1950     DATE: 2023     Assessment and Plan:     Problem List Items Addressed This Visit        Respiratory    CHRISTIANO (obstructive sleep apnea)     -Patient diagnosed with severe CHRISTIANO  -Seen by pulmonology at [facility] on   -CPAP has been recommended and is currently undergoing insurance approval            Nervous and Auditory    Lumbar radiculopathy     -Patient will be given a trial of gabapentin 300 mg nightly. -They have a referral to see the spine center at [facility]. I have encouraged him to schedule an appointment to discuss possible injection. Relevant Medications    gabapentin (Neurontin) 300 mg capsule       Musculoskeletal and Integument    Rheumatoid arthritis ([address])     - Appreciate rheumatology follow-up           Baker's cyst of knee, right     -A referral has been made to orthopedic surgery         Relevant Orders    Ambulatory Referral to Orthopedic Surgery       Other    Bilateral tinnitus     -Patient seen by ENT  -Found to have mild high-frequency hearing loss  -She was also noted to have a cerumen impaction which was cleared         Major depressive disorder, single episode, mild (HCC)     -Stable continue current dose of Zoloft         Constipation     -Secondary to chronic opioid use  -Patient has been given a prescription for docusate 100 mg twice daily as needed         Relevant Medications    docusate sodium (COLACE) 100 mg capsule   Other Visit Diagnoses     Annual physical exam    -  Primary    Encounter for screening mammogram for breast cancer        Relevant Orders    Mammo screening bilateral w 3d & cad          Immunizations and preventive care screenings were discussed with patient today. Appropriate education was printed on patient's after visit summary. Return in about 6 months (around 2024).      Chief Complaint:     Chief Complaint   Patient presents with   • Establish Care      History of Present Illness:     Adult Annual Physical   Patient here for a comprehensive physical exam.  Her past medical history is most notable for rheumatoid arthritis for which she is on chronic steroids, chronic low back pain and essential hypertension.     Since her last visit she reports her headache has significantly improved. At the time she presented complaining of daily persistent headache. She was given an IM Toradol shot in office and states since then she has relatively few headache days every month.     Regarding her rheumatoid arthritis she is followed closely by rheumatology at [facility]. Her previous rheumatologist Dr. Kyree Arce has retired. She is currently taking oxycodone daily as needed. This is also managed with daily prednisone, Orencia and methotrexate.     Today she reports she has been experiencing persistent low back pain with right-sided sciatica. She underwent an MRI in 2020 which revealed significant neuroforaminal stenosis on the right with lumbar degenerative disc disease. Patient reports she was referred to the spine center at [facility] but has not yet scheduled an appointment.     She also endorses constipation. She notes that it has become worse since she started taking her oxycodone on a regular basis.     Lastly, she complains of a lump behind her right knee. She notes it is tender to touch. Depression Screening  PHQ-2/9 Depression Screening            Review of Systems:     Review of Systems All other systems negative except for pertinent findings noted in HPI.       Past Medical History:     Past Medical History:   Diagnosis Date   • Acute respiratory insufficiency 12/02/2019   • Arthritis    • Depression    • Diverticulitis of large intestine with perforation and abscess 09/14/2022   • Fall down stairs 11/25/2019   • History of DVT (deep vein thrombosis)    • Occipital scalp laceration 12/02/2019   • Pneumonitis    • Rheumatoid arthritis ([address] [facility])       Past Surgical History:     Past Surgical History:   Procedure Laterality Date   •  SECTION     • EXPLORATORY LAPAROTOMY     • HAND SURGERY Right    • HARTMANS PROCEDURE  2022      Social History:     Social History     Socioeconomic History   • Marital status: /Civil Union     Spouse name: None   • Number of children: None   • Years of education: None   • Highest education level: None   Occupational History   • None   Tobacco Use   • Smoking status: Never   • Smokeless tobacco: Never   Substance and Sexual Activity   • Alcohol use: Not Currently   • Drug use: Not Currently   • Sexual activity: Yes     Partners: Male   Other Topics Concern   • None   Social History Narrative   • None     Social Determinants of Health     Financial Resource Strain: Not on file   Food Insecurity: Not on file   Transportation Needs: Not on file   Physical Activity: Not on file   Stress: Not on file   Social Connections: Not on file   Intimate Partner Violence: Not on file   Housing Stability: Not on file      Family History:     Family History   Problem Relation Age of Onset   • Heart disease Mother    • Hypertension Mother    • Hypertension Father    • Arthritis Family       Current Medications:     Current Outpatient Medications   Medication Sig Dispense Refill   • acetaminophen (TYLENOL) 325 mg tablet Take 3 tablets (975 mg total) by mouth every 8 (eight) hours 30 tablet 0   • docusate sodium (COLACE) 100 mg capsule Take 1 capsule (100 mg total) by mouth 2 (two) times a day as needed for constipation 60 capsule 0   • ergocalciferol (VITAMIN D2) 50,000 units TAKE 1 CAPSULE BY MOUTH ONE TIME PER WEEK     • gabapentin (Neurontin) 300 mg capsule Take 1 capsule (300 mg total) by mouth daily at bedtime 30 capsule 0   • methocarbamol (ROBAXIN) 500 mg tablet Take 1 tablet (500 mg total) by mouth 4 (four) times a day 60 tablet 0   • methotrexate 2.5 MG tablet      • Orencia ClickJect 437 MG/ML SOAJ      • oxyCODONE (ROXICODONE) 5 mg immediate release tablet Take 1 tablet (5 mg total) by mouth every 4 (four) hours as needed for moderate painMax Daily Amount: 30 mg 30 tablet 0   • oxyCODONE (ROXICODONE) 5 mg immediate release tablet Take 0.5 tablets (2.5 mg total) by mouth every 4 (four) hours as needed for moderate painMax Daily Amount: 15 mg 30 tablet 0   • predniSONE 10 mg tablet Take 1 tablet (10 mg total) by mouth daily  0   • sertraline (ZOLOFT) 100 mg tablet Take 1 tablet (100 mg total) by mouth daily  0     No current facility-administered medications for this visit.       Allergies:     No Known Allergies   Physical Exam:     /72   Pulse 82   Ht 5' 2" (1.575 m)   Wt 76.1 kg (167 lb 12.8 oz)   LMP  (LMP Unknown)   SpO2 94%   BMI 30.69 kg/m²     Physical Exam   General: NAD, Alert and oriented x3   HEENT: NCAT, EOMI, normal conjunctiva  Cardiovascular: RRR, normal S1 and S2, no m/r/g  Pulmonary: Normal respiratory effort, no wheezes, rales or rhonchi  GI: Soft, nontender, nondistended, normoactive bowel sounds  Musculoskeletal: Normal bulk and tone, Foucher's sign present on right, right popliteal lump   Neuro: Antalgic gait   Extremities: No lower extremity edema  Skin: Normal skin color, no rashes   Psychiatric: Normal mood and affect         Franklin Olivas MD  [facility]

## 2023-08-21 NOTE — ASSESSMENT & PLAN NOTE
-Patient diagnosed with severe CHRISTIANO  -Seen by pulmonology at [facility] on 8/9  -CPAP has been recommended and is currently undergoing insurance approval

## 2023-08-21 NOTE — ASSESSMENT & PLAN NOTE
-Secondary to chronic opioid use  -Patient has been given a prescription for docusate 100 mg twice daily as needed

## 2023-08-21 NOTE — ASSESSMENT & PLAN NOTE
-Patient seen by ENT  -Found to have mild high-frequency hearing loss  -She was also noted to have a cerumen impaction which was cleared

## 2023-08-21 NOTE — PATIENT INSTRUCTIONS
-You will be started on gabapentin 300 mg at night for your low back pain with sciatica  -Please schedule follow-up with advanced spine center at [facility]  -For constipation you will be started on docusate twice a day as needed    Wellness Visit for Adults   AMBULATORY CARE:   A wellness visit  is when you see your healthcare provider to get screened for health problems. Your healthcare provider will also give you advice on how to stay healthy. Write down your questions so you remember to ask them. Ask your healthcare provider how often you should have a wellness visit. What happens at a wellness visit:  Your healthcare provider will ask about your health, and your family history of health problems. This includes high blood pressure, heart disease, and cancer. He or she will ask if you have symptoms that concern you, if you smoke, and about your mood. You may also be asked about your intake of medicines, supplements, food, and alcohol. Any of the following may be done: Your weight  will be checked. Your height may also be checked so your body mass index (BMI) can be calculated. Your BMI shows if you are at a healthy weight. Your blood pressure  and heart rate will be checked. Your temperature may also be checked. Blood and urine tests  may be done. Blood tests may be done to check your cholesterol levels. Abnormal cholesterol levels increase your risk for heart disease and stroke. You may also need a blood or urine test to check for diabetes if you are at increased risk. Urine tests may be done to look for signs of an infection or kidney disease. A physical exam  includes checking your heartbeat and lungs with a stethoscope. Your healthcare provider may also check your skin to look for sun damage. Screening tests  may be recommended. A screening test is done to check for diseases that may not cause symptoms. The screening tests you may need depend on your age, gender, family history, and lifestyle habits. For example, colorectal screening may be recommended if you are 48years old or older. Screening tests you need if you are a woman:   A Pap smear  is used to screen for cervical cancer. Pap smears are usually done every 3 to 5 years depending on your age. You may need them more often if you have had abnormal Pap smear test results in the past. Ask your healthcare provider how often you should have a Pap smear. A mammogram  is an x-ray of your breasts to screen for breast cancer. Experts recommend mammograms every 2 years starting at age 48 years. You may need a mammogram at age 52 years or younger if you have an increased risk for breast cancer. Talk to your healthcare provider about when you should start having mammograms and how often you need them. Vaccines you may need:   Get an influenza vaccine  every year. The influenza vaccine protects you from the flu. Several types of viruses cause the flu. The viruses change over time, so new vaccines are made each year. Get a tetanus-diphtheria (Td) booster vaccine  every 10 years. This vaccine protects you against tetanus and diphtheria. Tetanus is a severe infection that may cause painful muscle spasms and lockjaw. Diphtheria is a severe bacterial infection that causes a thick covering in the back of your mouth and throat. Get a human papillomavirus (HPV) vaccine  if you are female and aged 23 to 32 or male 23 to 24 and never received it. This vaccine protects you from HPV infection. HPV is the most common infection spread by sexual contact. HPV may also cause vaginal, penile, and anal cancers. Get a pneumococcal vaccine  if you are aged 72 years or older. The pneumococcal vaccine is an injection given to protect you from pneumococcal disease. Pneumococcal disease is an infection caused by pneumococcal bacteria. The infection may cause pneumonia, meningitis, or an ear infection.     Get a shingles vaccine  if you are 60 or older, even if you have had shingles before. The shingles vaccine is an injection to protect you from the varicella-zoster virus. This is the same virus that causes chickenpox. Shingles is a painful rash that develops in people who had chickenpox or have been exposed to the virus. How to eat healthy:  My Plate is a model for planning healthy meals. It shows the types and amounts of foods that should go on your plate. Fruits and vegetables make up about half of your plate, and grains and protein make up the other half. A serving of dairy is included on the side of your plate. The amount of calories and serving sizes you need depends on your age, gender, weight, and height. Examples of healthy foods are listed below:  Eat a variety of vegetables  such as dark green, red, and orange vegetables. You can also include canned vegetables low in sodium (salt) and frozen vegetables without added butter or sauces. Eat a variety of fresh fruits , canned fruit in 100% juice, frozen fruit, and dried fruit. Include whole grains. At least half of the grains you eat should be whole grains. Examples include whole-wheat bread, wheat pasta, brown rice, and whole-grain cereals such as oatmeal.    Eat a variety of protein foods such as seafood (fish and shellfish), lean meat, and poultry without skin (turkey and chicken). Examples of lean meats include pork leg, shoulder, or tenderloin, and beef round, sirloin, tenderloin, and extra lean ground beef. Other protein foods include eggs and egg substitutes, beans, peas, soy products, nuts, and seeds. Choose low-fat dairy products such as skim or 1% milk or low-fat yogurt, cheese, and cottage cheese. Limit unhealthy fats  such as butter, hard margarine, and shortening. Exercise:  Exercise at least 30 minutes per day on most days of the week. Some examples of exercise include walking, biking, dancing, and swimming.  You can also fit in more physical activity by taking the stairs instead of the elevator or parking farther away from stores. Include muscle strengthening activities 2 days each week. Regular exercise provides many health benefits. It helps you manage your weight, and decreases your risk for type 2 diabetes, heart disease, stroke, and high blood pressure. Exercise can also help improve your mood. Ask your healthcare provider about the best exercise plan for you. General health and safety guidelines:   Do not smoke. Nicotine and other chemicals in cigarettes and cigars can cause lung damage. Ask your healthcare provider for information if you currently smoke and need help to quit. E-cigarettes or smokeless tobacco still contain nicotine. Talk to your healthcare provider before you use these products. Limit alcohol. A drink of alcohol is 12 ounces of beer, 5 ounces of wine, or 1½ ounces of liquor. Lose weight, if needed. Being overweight increases your risk of certain health conditions. These include heart disease, high blood pressure, type 2 diabetes, and certain types of cancer. Protect your skin. Do not sunbathe or use tanning beds. Use sunscreen with a SPF 15 or higher. Apply sunscreen at least 15 minutes before you go outside. Reapply sunscreen every 2 hours. Wear protective clothing, hats, and sunglasses when you are outside. Drive safely. Always wear your seatbelt. Make sure everyone in your car wears a seatbelt. A seatbelt can save your life if you are in an accident. Do not use your cell phone when you are driving. This could distract you and cause an accident. Pull over if you need to make a call or send a text message. Practice safe sex. Use latex condoms if are sexually active and have more than one partner. Your healthcare provider may recommend screening tests for sexually transmitted infections (STIs). Wear helmets, lifejackets, and protective gear.   Always wear a helmet when you ride a bike or motorcycle, go skiing, or play sports that could cause a head injury. Wear protective equipment when you play sports. Wear a lifejacket when you are on a boat or doing water sports. © Copyright [employer] 2022 Information is for End User's use only and may not be sold, redistributed or otherwise used for commercial purposes. The above information is an  only. It is not intended as medical advice for individual conditions or treatments. Talk to your doctor, nurse or pharmacist before following any medical regimen to see if it is safe and effective for you.

## 2023-08-24 ENCOUNTER — TELEPHONE (OUTPATIENT)
Dept: INTERNAL MEDICINE CLINIC | Facility: CLINIC | Age: 73
End: 2023-08-24

## 2023-08-24 NOTE — TELEPHONE ENCOUNTER
Patient needs a visit for evaluation if an IM injection of Toradol is to be given. There is an oral option that she may take if she's interested. Given the chronicity of her headaches I recommend she schedule her new patient visit with neurology. She was given a referral to our headache clinic during her initial establish care visit.

## 2023-08-24 NOTE — TELEPHONE ENCOUNTER
Notify patient I have an opening tomorrow. She can either go to an urgent care now or wait to be seen tomorrow.

## 2023-08-24 NOTE — TELEPHONE ENCOUNTER
Spoke with pt , she refuse to go to urgent Care to be evaluated. Pt insist to be seen and also request a call from provider.  Please advise

## 2024-02-20 ENCOUNTER — RA CDI HCC (OUTPATIENT)
Dept: OTHER | Facility: HOSPITAL | Age: 74
End: 2024-02-20

## 2024-05-21 ENCOUNTER — TELEPHONE (OUTPATIENT)
Dept: ADMINISTRATIVE | Facility: OTHER | Age: 74
End: 2024-05-21

## 2024-05-21 NOTE — TELEPHONE ENCOUNTER
Upon review of the In Basket request we were able to locate, review, and update the patient chart as requested for CRC: Colonoscopy.    Any additional questions or concerns should be emailed to the Practice Liaisons via the appropriate education email address, please do not reply via In Basket.    Thank you  VJ LEO

## 2024-05-21 NOTE — TELEPHONE ENCOUNTER
----- Message from Franklin Sherwood MD sent at 5/20/2024  9:06 PM EDT -----  Regarding: Care Gap Request  05/20/24 9:06 PM    Hello, our patient attached above has had CRC: Colonoscopy completed/performed. Please assist in updating the patient chart by pulling the Care Everywhere (CE) document. The date of service is 2/1/2023.     Thank you,  Franklin Sherwood MD  [facility]